# Patient Record
Sex: FEMALE | Race: WHITE | Employment: PART TIME | URBAN - METROPOLITAN AREA
[De-identification: names, ages, dates, MRNs, and addresses within clinical notes are randomized per-mention and may not be internally consistent; named-entity substitution may affect disease eponyms.]

---

## 2017-02-09 ENCOUNTER — TELEPHONE (OUTPATIENT)
Dept: FAMILY MEDICINE | Facility: CLINIC | Age: 62
End: 2017-02-09

## 2017-02-09 DIAGNOSIS — E78.00 HYPERCHOLESTEROLEMIA: Primary | ICD-10-CM

## 2017-02-09 RX ORDER — SIMVASTATIN 20 MG
20 TABLET ORAL AT BEDTIME
Qty: 90 TABLET | Refills: 0 | Status: SHIPPED | OUTPATIENT
Start: 2017-02-09 | End: 2017-04-25

## 2017-02-09 NOTE — TELEPHONE ENCOUNTER
Reason for Call:  Medication or medication refill:    Do you use a [facility]?  Name of the pharmacy and phone number for the current request:  [facility] [address] [place] [id number] [facility]    Name of the medication requested: simvastatin 20 mg    Other request: please call when approved so I can     Can we leave a detailed message on this number? YES    Phone number patient can be reached at: [phone]    Best Time: any    Call taken on 2/9/2017 at 12:21 PM by Anne Portillo

## 2017-02-09 NOTE — TELEPHONE ENCOUNTER
Prescription approved per [facility] Refill Protocol.  Shani Giles RN

## 2017-04-10 DIAGNOSIS — F33.0 MAJOR DEPRESSIVE DISORDER, RECURRENT EPISODE, MILD (H): ICD-10-CM

## 2017-04-11 NOTE — TELEPHONE ENCOUNTER
venlafaxine (EFFEXOR) 25 MG tablet   Last Written Prescription Date: 3/28/2016  Last Fill Quantity: 180, # refills: 3  Last Office Visit with [facility], [facility] or [facility] prescribing provider: 3/28/2016        BP Readings from Last 3 Encounters:   03/28/16 132/82   05/05/15 136/82   01/28/14 155/89     Pulse: (for Fetzima)  Creatinine   Date Value Ref Range Status   03/28/2016 0.62 0.52 - 1.04 mg/dL Final   ]    Last PHQ-9 score on record=   PHQ-9 SCORE 3/28/2016   Total Score -   Total Score 0

## 2017-04-12 RX ORDER — VENLAFAXINE 25 MG/1
25 TABLET ORAL 2 TIMES DAILY
Qty: 60 TABLET | Refills: 0 | Status: SHIPPED | OUTPATIENT
Start: 2017-04-12 | End: 2017-04-25

## 2017-04-12 NOTE — TELEPHONE ENCOUNTER
rx refilled. Please let patient know that she should f/u in clinic for next refill.    Candelario Asencio MD

## 2017-04-12 NOTE — TELEPHONE ENCOUNTER
Routing refill request to provider for review/approval because:  Patient needs to be seen because it has been more than 1 year since last office visit.  HUBER Arceo, Clinical RN [facility].

## 2017-04-12 NOTE — TELEPHONE ENCOUNTER
Called, pt is notified, she will call back to schedule an appt.  Devang Smith,  For [facility]

## 2017-04-25 ENCOUNTER — OFFICE VISIT (OUTPATIENT)
Dept: FAMILY MEDICINE | Facility: CLINIC | Age: 62
End: 2017-04-25
Payer: COMMERCIAL

## 2017-04-25 VITALS
TEMPERATURE: 98.8 F | BODY MASS INDEX: 40.57 KG/M2 | DIASTOLIC BLOOD PRESSURE: 78 MMHG | SYSTOLIC BLOOD PRESSURE: 130 MMHG | OXYGEN SATURATION: 97 % | HEIGHT: 63 IN | HEART RATE: 99 BPM | WEIGHT: 229 LBS

## 2017-04-25 DIAGNOSIS — F33.0 MAJOR DEPRESSIVE DISORDER, RECURRENT EPISODE, MILD (H): Primary | ICD-10-CM

## 2017-04-25 DIAGNOSIS — I10 HYPERTENSION GOAL BP (BLOOD PRESSURE) < 140/90: ICD-10-CM

## 2017-04-25 DIAGNOSIS — E78.5 HYPERLIPIDEMIA LDL GOAL <130: ICD-10-CM

## 2017-04-25 DIAGNOSIS — E66.01 MORBID OBESITY WITH BMI OF 40.0-44.9, ADULT (H): ICD-10-CM

## 2017-04-25 DIAGNOSIS — E78.00 HYPERCHOLESTEROLEMIA: ICD-10-CM

## 2017-04-25 DIAGNOSIS — Z11.59 NEED FOR HEPATITIS C SCREENING TEST: ICD-10-CM

## 2017-04-25 LAB
ANION GAP SERPL CALCULATED.3IONS-SCNC: 9 MMOL/L (ref 3–14)
BUN SERPL-MCNC: 13 MG/DL (ref 7–30)
CALCIUM SERPL-MCNC: 9.2 MG/DL (ref 8.5–10.1)
CHLORIDE SERPL-SCNC: 102 MMOL/L (ref 94–109)
CO2 SERPL-SCNC: 28 MMOL/L (ref 20–32)
CREAT SERPL-MCNC: 0.65 MG/DL (ref 0.52–1.04)
CREAT UR-MCNC: 42 MG/DL
GFR SERPL CREATININE-BSD FRML MDRD: ABNORMAL ML/MIN/1.7M2
GLUCOSE SERPL-MCNC: 101 MG/DL (ref 70–99)
MICROALBUMIN UR-MCNC: <5 MG/L
MICROALBUMIN/CREAT UR: NORMAL MG/G CR (ref 0–25)
POTASSIUM SERPL-SCNC: 4.1 MMOL/L (ref 3.4–5.3)
SODIUM SERPL-SCNC: 139 MMOL/L (ref 133–144)

## 2017-04-25 PROCEDURE — 80048 BASIC METABOLIC PNL TOTAL CA: CPT | Performed by: PREVENTIVE MEDICINE

## 2017-04-25 PROCEDURE — 36415 COLL VENOUS BLD VENIPUNCTURE: CPT | Performed by: PREVENTIVE MEDICINE

## 2017-04-25 PROCEDURE — 82043 UR ALBUMIN QUANTITATIVE: CPT | Performed by: PREVENTIVE MEDICINE

## 2017-04-25 PROCEDURE — 99214 OFFICE O/P EST MOD 30 MIN: CPT | Performed by: PREVENTIVE MEDICINE

## 2017-04-25 PROCEDURE — 86803 HEPATITIS C AB TEST: CPT | Performed by: PREVENTIVE MEDICINE

## 2017-04-25 RX ORDER — VENLAFAXINE 25 MG/1
25 TABLET ORAL 2 TIMES DAILY
Qty: 180 TABLET | Refills: 3 | Status: SHIPPED | OUTPATIENT
Start: 2017-04-25 | End: 2018-04-24

## 2017-04-25 RX ORDER — LISINOPRIL 10 MG/1
TABLET ORAL
Qty: 90 TABLET | Refills: 3 | Status: SHIPPED | OUTPATIENT
Start: 2017-04-25 | End: 2018-04-24

## 2017-04-25 RX ORDER — SIMVASTATIN 20 MG
20 TABLET ORAL AT BEDTIME
Qty: 90 TABLET | Refills: 3 | Status: SHIPPED | OUTPATIENT
Start: 2017-04-25 | End: 2018-04-24

## 2017-04-25 RX ORDER — HYDROCHLOROTHIAZIDE 25 MG/1
TABLET ORAL
Qty: 90 TABLET | Refills: 3 | Status: SHIPPED | OUTPATIENT
Start: 2017-04-25 | End: 2018-03-25

## 2017-04-25 ASSESSMENT — PAIN SCALES - GENERAL: PAINLEVEL: NO PAIN (0)

## 2017-04-25 NOTE — NURSING NOTE
"Chief Complaint   Patient presents with     Hypertension     Lipids       Initial /78  Pulse 99  Temp 98.8  F (37.1  C) (Oral)  Ht 5' 2.5\" (1.588 m)  Wt 229 lb (103.9 kg)  SpO2 97%  Breastfeeding? No  BMI 41.22 kg/m2 Estimated body mass index is 41.22 kg/(m^2) as calculated from the following:    Height as of this encounter: 5' 2.5\" (1.588 m).    Weight as of this encounter: 229 lb (103.9 kg).  Medication Reconciliation: complete   Alexandra MORRIS        "

## 2017-04-25 NOTE — MR AVS SNAPSHOT
After Visit Summary   4/25/2017    Roxie Woods    MRN: [id number]           Patient Information     Date Of Birth          1955        Visit Information        Provider Department      4/25/2017 11:00 AM Ana Arboleda MD [facility]        Today's Diagnoses     Major depressive disorder, recurrent episode, mild (H)    -  1    Need for hepatitis C screening test        Hypercholesterolemia        Hypertension goal BP (blood pressure) < 140/90          Care Instructions    At [facility], we strive to deliver an exceptional experience to you, every time we see you.    If you receive a survey in the mail, please send us back your thoughts. We really do value your feedback.    Thank you for visiting [facility]    Normal or non-critical lab and imaging results will be communicated to you by MyChart, letter or phone within 7 days.  If you do not hear from us within 10 days, please call the clinic. If you have a critical or abnormal lab result, we will notify you by phone as soon as possible.     If you have any questions regarding your visit please contact:     Team Emi/Spirit  Clinic Hours Telephone Number   Dr. Patrice Lucas   7am-7pm  Monday through Thursday  7am-5pm Friday [phone]  Kindra GILLESPIE RN   Pharmacy 8:30am-9pm Monday-Friday    9am-5pm Saturday-Sunday [phone]   Urgent Care 11am-9pm Monday-Friday        9am-5pm Saturday-Sunday [phone]     After hours, weekend or if you need to make an appointment with your primary provider please call [phone].   After Hours nurse advise: call [facility]: [phone]    Use Moleculera Labshart (secure email communication and access to your chart) to send your primary care provider a message or make an appointment. Ask someone on your Team how to sign up for  "Leadformancehart. To log on to Tappit or for more information in NearbyNow please visit the website at [email or web address].   As of 2013, all password changes, disabled accounts, or ID changes in Lot78/MyHealth will be done by our Access Services Department.   If you need help with your account or password, call: [phone]. Clinic staff no longer has the ability to change passwords.           Follow-ups after your visit        Who to contact     If you have questions or need follow up information about today's clinic visit or your schedule please contact [facility] directly at [phone].  Normal or non-critical lab and imaging results will be communicated to you by Leadformancehart, letter or phone within 4 business days after the clinic has received the results. If you do not hear from us within 7 days, please contact the clinic through Integrated Trade Processingt or phone. If you have a critical or abnormal lab result, we will notify you by phone as soon as possible.  Submit refill requests through Lot78 or call your pharmacy and they will forward the refill request to us. Please allow 3 business days for your refill to be completed.          Additional Information About Your Visit        Lot78 Information     Lot78 lets you send messages to your doctor, view your test results, renew your prescriptions, schedule appointments and more. To sign up, go to [email or web address] . Click on \"Log in\" on the left side of the screen, which will take you to the Welcome page. Then click on \"Sign up Now\" on the right side of the page.     You will be asked to enter the access code listed below, as well as some personal information. Please follow the directions to create your username and password.     Your access code is: [email or web address]  Expires: 2017 11:32 AM     Your access code will  in 90 days. If you need help or a new code, please call your [facility] or [phone].        Care EveryWhere " "ID     This is your Care EveryWhere ID. This could be used by other organizations to access your [facility] medical records  [id number]        Your Vitals Were     Pulse Temperature Height Pulse Oximetry Breastfeeding? BMI (Body Mass Index)    99 98.8  F (37.1  C) (Oral) 5' 2.5\" (1.588 m) 97% No 41.22 kg/m2       Blood Pressure from Last 3 Encounters:   04/25/17 130/78   03/28/16 132/82   05/05/15 136/82    Weight from Last 3 Encounters:   04/25/17 229 lb (103.9 kg)   03/28/16 229 lb (103.9 kg)   05/05/15 226 lb (102.5 kg)              We Performed the Following     Albumin Random Urine Quantitative     BASIC METABOLIC PANEL     Hepatitis C Screen Reflex to HCV RNA Quant and Genotype          Where to get your medicines      These medications were sent to [facility] [address] [place] [address]     Phone:  [phone]     hydrochlorothiazide 25 MG tablet    lisinopril 10 MG tablet    simvastatin 20 MG tablet    venlafaxine 25 MG tablet          Primary Care Provider Office Phone # Fax #    Marlen Aguilar PA-C [phone]       [facility] [address] [facility] [address]        Thank you!     Thank you for choosing [facility]  for your care. Our goal is always to provide you with excellent care. Hearing back from our patients is one way we can continue to improve our services. Please take a few minutes to complete the written survey that you may receive in the mail after your visit with us. Thank you!             Your Updated Medication List - Protect others around you: Learn how to safely use, store and throw away your medicines at www.disposemymeds.org.          This list is accurate as of: 4/25/17 11:32 AM.  Always use your most recent med list.                   Brand Name Dispense Instructions for use    hydrochlorothiazide 25 MG tablet    HYDRODIURIL    90 tablet    TAKE 1 TABLET (25 MG) " BY MOUTH DAILY       lisinopril 10 MG tablet    PRINIVIL/ZESTRIL    90 tablet    TAKE 1 TABLET (10 MG) BY MOUTH DAILY       simvastatin 20 MG tablet    ZOCOR    90 tablet    Take 1 tablet (20 mg) by mouth At Bedtime       venlafaxine 25 MG tablet    EFFEXOR    180 tablet    Take 1 tablet (25 mg) by mouth 2 times daily

## 2017-04-25 NOTE — PATIENT INSTRUCTIONS
[facility] to you, every time we see you.    If you receive a survey in the mail, please send us back your thoughts. We really do value your feedback.    Thank you for visiting [facility]    Normal or non-critical lab and imaging results will be communicated to you by MyChart, letter or phone within 7 days.  If you do not hear from us within 10 days, please call the clinic. If you have a critical or abnormal lab result, we will notify you by phone as soon as possible.     If you have any questions regarding your visit please contact:     Team Emi/Spirit  Clinic Hours Telephone Number   Dr. Patrice Lucas   7am-7pm  Monday through Thursday  7am-5pm Friday [phone]  Kindra GILLESPIE RN   Pharmacy 8:30am-9pm Monday-Friday    9am-5pm Saturday-Sunday [phone]   Urgent Care 11am-9pm Monday-Friday        9am-5pm Saturday-Sunday [phone]     After hours, weekend or if you need to make an appointment with your primary provider please call [phone].   After Hours nurse advise: call [facility]: [phone]    Use Hongdianzhibohart (secure email communication and access to your chart) to send your primary care provider a message or make an appointment. Ask someone on your Team how to sign up for Hippflow. To log on to Lomaki or for more information in AdaptiveBlue please visit the website at [email or web address].   As of October 8, 2013, all password changes, disabled accounts, or ID changes in Hippflow/MyHealth will be done by our Access Services Department.   If you need help with your account or password, call: [phone]. Clinic staff no longer has the ability to change passwords.

## 2017-04-25 NOTE — PROGRESS NOTES
SUBJECTIVE:                                                    Roxie Woods is a 61 year old female who presents to clinic today for the following health issues:    I have reviewed and agree with the documentation by the MA. I updated the history as indicated.  Ana Arboleda MD MPH      Hyperlipidemia Follow-Up      Rate your low fat/cholesterol diet?: fair    Taking statin?  Yes, no muscle aches from statin    Other lipid medications/supplements?:  none     Hypertension Follow-up      Outpatient blood pressures are not being checked.    Low Salt Diet: low salt       Amount of exercise or physical activity: 6-7 days/week for an average of greater than 60 minutes    Problems taking medications regularly: No    Medication side effects: none    Diet: low salt and low fat/cholesterol      Depression Followup    Status since last visit: Stable     See PHQ-9 for current symptoms.  Other associated symptoms: None    Complicating factors:   Significant life event:  No   Current substance abuse:  None  Anxiety or Panic symptoms:  No    PHQ-9  English PHQ-9   Any Language          Is also concerned about a rash on her hands, has had for a few days, consistent with Xerosis cutis, advised use of Aquaphor.  She also has a lesion on her left clavicle area, unchanged for many years, wondering is she needs to see a Dermatologist. This is consistent with seborrheic keratosis, advised excision if increase in size, changes in color, pain or drainage.    Problem list and histories reviewed & adjusted, as indicated.  Additional history: as documented    Patient Active Problem List   Diagnosis     Family history of factor V Leiden mutation     Factor 5 Leiden mutation, heterozygous (H)     Hypertension goal BP (blood pressure) < 140/90     DUB (dysfunctional uterine bleeding)     Tobacco dependency     Menopausal syndrome (hot flashes)     Advanced directives, counseling/discussion     Hypercholesterolemia     Elevated fasting  blood sugar     Mild major depression (H)     Menopausal symptoms     Hyperlipidemia LDL goal <130     Morbid obesity with BMI of 40.0-44.9, adult (H)     Past Surgical History:   Procedure Laterality Date     C NONSPECIFIC PROCEDURE      Toenails     CARPAL TUNNEL RELEASE RT/LT  11/17/99    RT       Social History   Substance Use Topics     Smoking status: Current Every Day Smoker     Packs/day: 1.00     Years: 20.00     Types: Cigarettes     Smokeless tobacco: Never Used     Alcohol use Yes     Family History   Problem Relation Age of Onset     DIABETES Mother      Breast Cancer Mother      Cancer - colorectal Father      Prostate Cancer Father      DIABETES Maternal Grandfather          Current Outpatient Prescriptions   Medication Sig Dispense Refill     venlafaxine (EFFEXOR) 25 MG tablet Take 1 tablet (25 mg) by mouth 2 times daily 180 tablet 3     simvastatin (ZOCOR) 20 MG tablet Take 1 tablet (20 mg) by mouth At Bedtime 90 tablet 3     hydrochlorothiazide (HYDRODIURIL) 25 MG tablet TAKE 1 TABLET (25 MG) BY MOUTH DAILY 90 tablet 3     lisinopril (PRINIVIL/ZESTRIL) 10 MG tablet TAKE 1 TABLET (10 MG) BY MOUTH DAILY 90 tablet 3     [DISCONTINUED] venlafaxine (EFFEXOR) 25 MG tablet Take 1 tablet (25 mg) by mouth 2 times daily 60 tablet 0     [DISCONTINUED] simvastatin (ZOCOR) 20 MG tablet Take 1 tablet (20 mg) by mouth At Bedtime 90 tablet 0     [DISCONTINUED] hydrochlorothiazide (HYDRODIURIL) 25 MG tablet TAKE 1 TABLET (25 MG) BY MOUTH DAILY 90 tablet 3     [DISCONTINUED] lisinopril (PRINIVIL,ZESTRIL) 10 MG tablet TAKE 1 TABLET (10 MG) BY MOUTH DAILY 90 tablet 3     Allergies   Allergen Reactions     Zoloft Rash     BP Readings from Last 3 Encounters:   04/25/17 130/78   03/28/16 132/82   05/05/15 136/82    Wt Readings from Last 3 Encounters:   04/25/17 229 lb (103.9 kg)   03/28/16 229 lb (103.9 kg)   05/05/15 226 lb (102.5 kg)                    Reviewed and updated as needed this visit by clinical  "staff  Tobacco  Allergies  Meds       Reviewed and updated as needed this visit by Provider         ROS:  Constitutional, neuro, ENT, endocrine, pulmonary, cardiac, gastrointestinal, genitourinary, musculoskeletal, integument and psychiatric systems are negative, except as otherwise noted.    OBJECTIVE:                                                    /78  Pulse 99  Temp 98.8  F (37.1  C) (Oral)  Ht 5' 2.5\" (1.588 m)  Wt 229 lb (103.9 kg)  SpO2 97%  Breastfeeding? No  BMI 41.22 kg/m2  Body mass index is 41.22 kg/(m^2).  GENERAL APPEARANCE: healthy, alert and no distress  EYES: Eyes grossly normal to inspection and conjunctivae and sclerae normal  NECK: no adenopathy and no asymmetry, masses, or scars  RESP: lungs clear to auscultation - no rales, rhonchi or wheezes  CV: regular rates and rhythm, normal S1 S2, no S3 or S4 and no murmur, click or rub  ABDOMEN: soft, non-tender  MS: extremities normal- no gross deformities noted and peripheral pulses normal  SKIN: Xerosis cutis on hands and seborrheic keratosis on left shoulder area with skin tags.   NEURO: Normal strength and tone, mentation intact and speech normal  PSYCH: mentation appears normal and affect normal/bright    Diagnostic test results:  Diagnostic Test Results:  No results found for this or any previous visit (from the past 24 hour(s)).     ASSESSMENT/PLAN:                                                    1. Major depressive disorder, recurrent episode, mild (H)  -Stable on medication   -Do not stop suddenly, has been on this dose for many years.   - venlafaxine (EFFEXOR) 25 MG tablet; Take 1 tablet (25 mg) by mouth 2 times daily  Dispense: 180 tablet; Refill: 3    2. Morbid obesity with BMI of 40.0-44.9, adult (H)  -Weight stable from last check     3. Hypercholesterolemia  - simvastatin (ZOCOR) 20 MG tablet; Take 1 tablet (20 mg) by mouth At Bedtime  Dispense: 90 tablet; Refill: 3    4. Hypertension goal BP (blood pressure) < " 140/90  - BASIC METABOLIC PANEL  - Albumin Random Urine Quantitative  - hydrochlorothiazide (HYDRODIURIL) 25 MG tablet; TAKE 1 TABLET (25 MG) BY MOUTH DAILY  Dispense: 90 tablet; Refill: 3  - lisinopril (PRINIVIL/ZESTRIL) 10 MG tablet; TAKE 1 TABLET (10 MG) BY MOUTH DAILY  Dispense: 90 tablet; Refill: 3    5. Hyperlipidemia LDL goal <130  -Continue statin  -Await lipid panel results     6. Need for hepatitis C screening test  - Hepatitis C Screen Reflex to HCV RNA Quant and Genotype    I ended our visit today by discussing the patient's diagnoses and recommended treatment. Please refer to today's diagnoses and orders for further details. I briefly discussed the pathophysiology of these conditions and outlined their expected course. I discussed the warning symptoms and signs that indicate an atypical course that would need urgent or emergent care. I also discussed self care strategies for symptom relief.  Patient voiced complete understanding of plan of care and was in full agreement to proceed. After visit summary discussed and handed to patient.    Common side effects of medications prescribed at this visit were discussed with the patient. Severe side effects, including current applicable black box warnings, were discussed.         Follow up with Provider - One year, sooner if any changes or concerns      Ana Arboleda MD MPH    [facility]

## 2017-04-25 NOTE — LETTER
[address] [id number] [phone]    April 26, 2017    Roxie Woods  [address] [facility] [address]      Dear Roxie Woods,     Urine sample did not show any abnormal protein. Screening test for Hepatitis C is negative. Electrolytes, glucose and kidney function are normal. Plan of care and follow up as discussed in clinic. Please let me know if you have any questions and thank you for choosing [facility].     Regards,     Ana Arboleda MD MPH/smj    Results for orders placed or performed in visit on 04/25/17   Hepatitis C Screen Reflex to HCV RNA Quant and Genotype   Result Value Ref Range    Hepatitis C Antibody  NR     Nonreactive   Assay performance characteristics have not been established for newborns,   infants, and children     BASIC METABOLIC PANEL   Result Value Ref Range    Sodium 139 133 - 144 mmol/L    Potassium 4.1 3.4 - 5.3 mmol/L    Chloride 102 94 - 109 mmol/L    Carbon Dioxide 28 20 - 32 mmol/L    Anion Gap 9 3 - 14 mmol/L    Glucose 101 (H) 70 - 99 mg/dL    Urea Nitrogen 13 7 - 30 mg/dL    Creatinine 0.65 0.52 - 1.04 mg/dL    GFR Estimate >90  Non  GFR Calc   >60 mL/min/1.7m2    GFR Estimate If Black >90   GFR Calc   >60 mL/min/1.7m2    Calcium 9.2 8.5 - 10.1 mg/dL   Albumin Random Urine Quantitative   Result Value Ref Range    Creatinine Urine 42 mg/dL    Albumin Urine mg/L <5 mg/L    Albumin Urine mg/g Cr Unable to calculate due to low value 0 - 25 mg/g Cr

## 2017-04-26 LAB — HCV AB SERPL QL IA: NORMAL

## 2017-04-26 ASSESSMENT — PATIENT HEALTH QUESTIONNAIRE - PHQ9: SUM OF ALL RESPONSES TO PHQ QUESTIONS 1-9: 0

## 2017-04-26 NOTE — PROGRESS NOTES
Please send a letter:    Dear Roxie Woods,    Urine sample did not show any abnormal protein. Screening test for Hepatitis C is negative. Electrolytes, glucose and kidney function are normal. Plan of care and follow up as discussed in clinic. Please let me know if you have any questions and thank you for choosing [facility].    Regards,    Ana Arboleda MD MPH

## 2017-12-14 ENCOUNTER — TELEPHONE (OUTPATIENT)
Dept: FAMILY MEDICINE | Facility: CLINIC | Age: 62
End: 2017-12-14

## 2017-12-14 DIAGNOSIS — Z12.11 COLON CANCER SCREENING: Primary | ICD-10-CM

## 2017-12-14 NOTE — TELEPHONE ENCOUNTER
Panel Management Review      BP Readings from Last 1 Encounters:   04/25/17 130/78    ,   Lab Results   Component Value Date    A1C 5.3 03/28/2016    A1C 5.5 05/05/2015   , Visit date not found  Last Office Visit with this department: Visit date not found    Fail List measure: Colon cancer screening      Patient is due/failing the following:   FIT    Action needed:   Complete FIT kit    Type of outreach:    Sent letter.    Questions for provider review:    None                                                                                                                                    Rudy Lopez      Chart routed to EDWARD .

## 2017-12-14 NOTE — LETTER
December 14, 2017      Roxie Woods  [address] [facility] [address]        Dear Roxie,     At [facility] we care about your health and are committed to providing quality patient care. Which includes staying current on preventive cancer screenings.  You can increase your chances of finding and treating cancers through regular screenings.      Our records indicate you may be due for the following preventive screening(s):    Fecal Colorectal Screen FIT Kit  Annual stool testing for colon cancer screening. This test is called a FIT test and it looks for blood in the stool. A kit may be requested from our clinic lab department then returned back at your earliest convenience.    To schedule an appointment or discuss this screening further, you may contact us by phone at the [facility] at [phone] or online through the patient portal/[email or web address] @ [email or web address]    If you have had any of the screenings listed above at another facility, please call us so that we may update your chart.      Your partners in health,        Quality Committee at [facility]

## 2018-03-25 DIAGNOSIS — I10 HYPERTENSION GOAL BP (BLOOD PRESSURE) < 140/90: ICD-10-CM

## 2018-03-25 NOTE — LETTER
Roxie Woods  [address] [facility] [address]          03/27/18      Dear Roxie Woods        At [facility] we care about your health and are committed to providing quality patient care. Regular appointments are a vital part of the care and management of your health and can help prevent many of the complications that can occur.      It has come to our attention that you are due for an office visit. Please call [facility] at [phone] soon to schedule your appointment.    If you have transferred care to another clinic please call to inform us so that we do not continue to send you reminder letters.      Sincerely,      [facility] Care Team

## 2018-03-25 NOTE — TELEPHONE ENCOUNTER
"Requested Prescriptions   Pending Prescriptions Disp Refills     hydrochlorothiazide (HYDRODIURIL) 25 MG tablet [Pharmacy Med Name: HydroCHLOROthiazide Oral Tablet 25 MG] 30 tablet 2    Last Written Prescription Date:  4/25/17  Last Fill Quantity: 90,  # refills: 3   Last Office Visit with G, P or [facility] prescribing provider:  4/25/2017     Future Office Visit:      Sig: TAKE ONE TABLET BY MOUTH ONE TIME DAILY    Diuretics (Including Combos) Protocol Passed    3/25/2018  7:01 AM       Passed - Blood pressure under 140/90 in past 12 months    BP Readings from Last 3 Encounters:   04/25/17 130/78   03/28/16 132/82   05/05/15 136/82                Passed - Recent (12 mo) or future (30 days) visit within the authorizing provider's specialty    Patient had office visit in the last 12 months or has a visit in the next 30 days with authorizing provider or within the authorizing provider's specialty.  See \"Patient Info\" tab in inbasket, or \"Choose Columns\" in Meds & Orders section of the refill encounter.           Passed - Patient is age 18 or older       Passed - No active pregancy on record       Passed - Normal serum creatinine on file in past 12 months    Recent Labs   Lab Test  04/25/17   1137   CR  0.65             Passed - Normal serum potassium on file in past 12 months    Recent Labs   Lab Test  04/25/17   1137   POTASSIUM  4.1                   Passed - Normal serum sodium on file in past 12 months    Recent Labs   Lab Test  04/25/17   1137   NA  139             Passed - No positive pregnancy test in past 12 months          "

## 2018-03-27 RX ORDER — HYDROCHLOROTHIAZIDE 25 MG/1
TABLET ORAL
Qty: 30 TABLET | Refills: 0 | Status: SHIPPED | OUTPATIENT
Start: 2018-03-27 | End: 2018-04-24

## 2018-03-27 NOTE — TELEPHONE ENCOUNTER
Medication is being filled for 1 time refill only due to:  Patient needs to be seen because due for yearly visit by end of April.     Please contact patient to schedule office visit.    Angelica Yost RN, BSN

## 2018-03-27 NOTE — TELEPHONE ENCOUNTER
This writer attempted to contact Patient on 03/27/18      Reason for call Patient given a jasbir refill and needs an appt before the next refill is needed and left message and sent a letter.      If patient calls back:   Schedule Office Visit appointment within 2 weeks with primary care, document that pt called and close encounter     Sent letter.  Radha Tracy MA

## 2018-04-18 ENCOUNTER — TELEPHONE (OUTPATIENT)
Dept: FAMILY MEDICINE | Facility: CLINIC | Age: 63
End: 2018-04-18

## 2018-04-18 NOTE — LETTER
April 18, 2018      Roxie Woods  [address]  [place] [address]    Dear Roxie Woods,     At [facility] we care about your health and are committed to providing quality patient care.    Which includes staying current on preventive cancer screenings.  You can increase your chances of finding and treating cancers through regular screenings.      Our records indicate you may be due for the following preventive screening(s):    Colonoscopy    Colonoscopy is recommended every ten years for everyone age 50 and older. Please take a moment to read over the enclosed information packet about colon cancer screening. We strongly urge our patient's to consider having a colonoscopy done, which is the best screening test available and only needs to be done every 10 years if normal. If you are unwilling or unable to have a colonoscopy then we recommend the annual stool testing for blood. This test is called a FIT test and it looks for blood in the stool.         To schedule an appointment or discuss this screening further, you may contact us by phone at the Health system at [phone] or online through the patient portal/[email or web address] @ [email or web address]    If you have had any of the screenings listed above at another facility, please call us so that we may update your chart.      Your partners in health,      Quality Committee at [facility]

## 2018-04-18 NOTE — TELEPHONE ENCOUNTER
Panel Management Review        Last Office Visit with this department:     Fail List measure:       Patient is due/failing the following:   COLONOSCOPY    Action needed:   none    Type of outreach:    Sent letter.    Questions for provider review:    None                                                                                                                                    Tammie Gonzalez MA

## 2018-04-24 ENCOUNTER — OFFICE VISIT (OUTPATIENT)
Dept: FAMILY MEDICINE | Facility: CLINIC | Age: 63
End: 2018-04-24
Payer: COMMERCIAL

## 2018-04-24 VITALS
DIASTOLIC BLOOD PRESSURE: 83 MMHG | TEMPERATURE: 98.4 F | BODY MASS INDEX: 41.11 KG/M2 | HEART RATE: 93 BPM | WEIGHT: 232 LBS | OXYGEN SATURATION: 97 % | HEIGHT: 63 IN | SYSTOLIC BLOOD PRESSURE: 129 MMHG

## 2018-04-24 DIAGNOSIS — F33.0 MAJOR DEPRESSIVE DISORDER, RECURRENT EPISODE, MILD (H): ICD-10-CM

## 2018-04-24 DIAGNOSIS — E78.00 HYPERCHOLESTEROLEMIA: ICD-10-CM

## 2018-04-24 DIAGNOSIS — I10 HYPERTENSION GOAL BP (BLOOD PRESSURE) < 140/90: Primary | ICD-10-CM

## 2018-04-24 DIAGNOSIS — Z12.11 SCREEN FOR COLON CANCER: ICD-10-CM

## 2018-04-24 DIAGNOSIS — F17.200 TOBACCO DEPENDENCY: ICD-10-CM

## 2018-04-24 DIAGNOSIS — Z23 ENCOUNTER FOR IMMUNIZATION: ICD-10-CM

## 2018-04-24 DIAGNOSIS — E66.01 MORBID OBESITY WITH BMI OF 40.0-44.9, ADULT (H): ICD-10-CM

## 2018-04-24 LAB
ANION GAP SERPL CALCULATED.3IONS-SCNC: 10 MMOL/L (ref 3–14)
BUN SERPL-MCNC: 14 MG/DL (ref 7–30)
CALCIUM SERPL-MCNC: 9.3 MG/DL (ref 8.5–10.1)
CHLORIDE SERPL-SCNC: 102 MMOL/L (ref 94–109)
CO2 SERPL-SCNC: 25 MMOL/L (ref 20–32)
CREAT SERPL-MCNC: 0.66 MG/DL (ref 0.52–1.04)
CREAT UR-MCNC: 15 MG/DL
GFR SERPL CREATININE-BSD FRML MDRD: >90 ML/MIN/1.7M2
GLUCOSE SERPL-MCNC: 98 MG/DL (ref 70–99)
LDLC SERPL DIRECT ASSAY-MCNC: 125 MG/DL
MICROALBUMIN UR-MCNC: <5 MG/L
MICROALBUMIN/CREAT UR: NORMAL MG/G CR (ref 0–25)
POTASSIUM SERPL-SCNC: 4.2 MMOL/L (ref 3.4–5.3)
SODIUM SERPL-SCNC: 137 MMOL/L (ref 133–144)

## 2018-04-24 PROCEDURE — 90732 PPSV23 VACC 2 YRS+ SUBQ/IM: CPT | Performed by: PREVENTIVE MEDICINE

## 2018-04-24 PROCEDURE — 36415 COLL VENOUS BLD VENIPUNCTURE: CPT | Performed by: PREVENTIVE MEDICINE

## 2018-04-24 PROCEDURE — 90471 IMMUNIZATION ADMIN: CPT | Performed by: PREVENTIVE MEDICINE

## 2018-04-24 PROCEDURE — 82043 UR ALBUMIN QUANTITATIVE: CPT | Performed by: PREVENTIVE MEDICINE

## 2018-04-24 PROCEDURE — 80048 BASIC METABOLIC PNL TOTAL CA: CPT | Performed by: PREVENTIVE MEDICINE

## 2018-04-24 PROCEDURE — 83721 ASSAY OF BLOOD LIPOPROTEIN: CPT | Performed by: PREVENTIVE MEDICINE

## 2018-04-24 PROCEDURE — 99214 OFFICE O/P EST MOD 30 MIN: CPT | Mod: 25 | Performed by: PREVENTIVE MEDICINE

## 2018-04-24 RX ORDER — VENLAFAXINE 25 MG/1
25 TABLET ORAL 2 TIMES DAILY
Qty: 180 TABLET | Refills: 3 | Status: SHIPPED | OUTPATIENT
Start: 2018-04-24 | End: 2019-04-16

## 2018-04-24 RX ORDER — LISINOPRIL 10 MG/1
TABLET ORAL
Qty: 90 TABLET | Refills: 3 | Status: SHIPPED | OUTPATIENT
Start: 2018-04-24 | End: 2019-04-16

## 2018-04-24 RX ORDER — SIMVASTATIN 20 MG
20 TABLET ORAL AT BEDTIME
Qty: 90 TABLET | Refills: 3 | Status: SHIPPED | OUTPATIENT
Start: 2018-04-24 | End: 2019-04-16

## 2018-04-24 RX ORDER — HYDROCHLOROTHIAZIDE 25 MG/1
25 TABLET ORAL DAILY
Qty: 90 TABLET | Refills: 3 | Status: SHIPPED | OUTPATIENT
Start: 2018-04-24 | End: 2019-04-16

## 2018-04-24 ASSESSMENT — ANXIETY QUESTIONNAIRES
7. FEELING AFRAID AS IF SOMETHING AWFUL MIGHT HAPPEN: NOT AT ALL
2. NOT BEING ABLE TO STOP OR CONTROL WORRYING: NOT AT ALL
5. BEING SO RESTLESS THAT IT IS HARD TO SIT STILL: NOT AT ALL
1. FEELING NERVOUS, ANXIOUS, OR ON EDGE: NOT AT ALL
IF YOU CHECKED OFF ANY PROBLEMS ON THIS QUESTIONNAIRE, HOW DIFFICULT HAVE THESE PROBLEMS MADE IT FOR YOU TO DO YOUR WORK, TAKE CARE OF THINGS AT HOME, OR GET ALONG WITH OTHER PEOPLE: NOT DIFFICULT AT ALL
3. WORRYING TOO MUCH ABOUT DIFFERENT THINGS: NOT AT ALL
GAD7 TOTAL SCORE: 0
6. BECOMING EASILY ANNOYED OR IRRITABLE: NOT AT ALL

## 2018-04-24 ASSESSMENT — PAIN SCALES - GENERAL: PAINLEVEL: NO PAIN (0)

## 2018-04-24 ASSESSMENT — PATIENT HEALTH QUESTIONNAIRE - PHQ9: 5. POOR APPETITE OR OVEREATING: NOT AT ALL

## 2018-04-24 NOTE — MR AVS SNAPSHOT
After Visit Summary   4/24/2018    Roxie Woods    MRN: [id number]           Patient Information     Date Of Birth          1955        Visit Information        Provider Department      4/24/2018 11:00 AM Ana Arboleda MD [facility]        Today's Diagnoses     Hypertension goal BP (blood pressure) < 140/90    -  1    Hypercholesterolemia        Major depressive disorder, recurrent episode, mild (H)        Screen for colon cancer        Tobacco dependency          Care Instructions    At [facility], we strive to deliver an exceptional experience to you, every time we see you.  If you receive a survey in the mail, please send us back your thoughts. We really do value your feedback.    Based on your medical history, these are the current health maintenance/preventive care services that you are due for (some may have been done at this visit.)  Health Maintenance Due   Topic Date Due     HIV SCREEN (SYSTEM ASSIGNED)  07/01/1974     DEPRESSION ACTION PLAN Q1 YR  05/05/2016     FIT Q1 YR  08/08/2017     INFLUENZA VACCINE (1) 09/01/2017     PHQ-9 Q6 MONTHS  10/25/2017     ADVANCE DIRECTIVE PLANNING Q5 YRS  12/18/2017     BMP Q1 YR  04/25/2018     MICROALBUMIN Q1 YEAR  04/25/2018       Suggested websites for health information:  Www.Trumpet Search.[employer]andMe : Up to date and easily searchable information on multiple topics.  Www.medlineplus.gov : medication info, interactive tutorials, watch real surgeries online  Www.familydoctor.org : good info from the Academy of Family Physicians  Www.cdc.gov : public health info, travel advisories, epidemics (H1N1)  Www.aap.org : children's health info, normal development, vaccinations  Www.health.[facility].[place].us : [place] dept of health, public health issues in [place], N1N1    Your care team:                            Family Medicine Internal Medicine   MD Valdo Avelar MD Shantel Branch-Fleming, MD Katya Georgiev  "ROLDAN Vee, APRN [facility]    Candelario Asencio MD Pediatrics   Prudencio Beauchamp, ROLDAN Mazariegos, CNP MD Scarlet Pepe APRN CNP   MD Keya Jefferson MD Deborah Mielke, MD Kim Thein, APRN CNP      Clinic hours: Monday - Thursday 7 am-7 pm; Fridays 7 am-5 pm.   Urgent care: Monday - Friday 11 am-9 pm; Saturday and Sunday 9 am-5 pm.  Pharmacy : Monday -Thursday 8 am-8 pm; Friday 8 am-6 pm; Saturday and Sunday 9 am-5 pm.     Clinic: [phone]   Pharmacy: [phone]              Follow-ups after your visit        Future tests that were ordered for you today     Open Future Orders        Priority Expected Expires Ordered    Fecal colorectal cancer screen (FIT) Routine 5/15/2018 7/17/2018 4/24/2018            Who to contact     If you have questions or need follow up information about today's clinic visit or your schedule please contact [facility] directly at [phone].  Normal or non-critical lab and imaging results will be communicated to you by MyChart, letter or phone within 4 business days after the clinic has received the results. If you do not hear from us within 7 days, please contact the clinic through MyChart or phone. If you have a critical or abnormal lab result, we will notify you by phone as soon as possible.  Submit refill requests through [employer] or call your pharmacy and they will forward the refill request to us. Please allow 3 business days for your refill to be completed.          Additional Information About Your Visit        Mod[employer]irmahar[employer] Advisor Information     [employer] lets you send messages to your doctor, view your test results, renew your prescriptions, schedule appointments and more. To sign up, go to [email or web address] . Click on \"Log in\" on the left side of the screen, which will take you to the Welcome page. Then click on \"Sign up Now\" on the right side of the page.     You will be asked to enter the access code listed below, as well as " "some personal information. Please follow the directions to create your username and password.     Your access code is: [email or web address]  Expires: 2018 11:22 AM     Your access code will  in 90 days. If you need help or a new code, please call your [place] clinic or [phone].        Care EveryWhere ID     This is your Care EveryWhere ID. This could be used by other organizations to access your [place] medical records  [id number]        Your Vitals Were     Pulse Temperature Height Pulse Oximetry Breastfeeding? BMI (Body Mass Index)    93 98.4  F (36.9  C) (Tympanic) 5' 2.5\" (1.588 m) 97% No 41.76 kg/m2       Blood Pressure from Last 3 Encounters:   18 129/83   17 130/78   16 132/82    Weight from Last 3 Encounters:   18 232 lb (105.2 kg)   17 229 lb (103.9 kg)   16 229 lb (103.9 kg)              We Performed the Following     Albumin Random Urine Quantitative with Creat Ratio     BASIC METABOLIC PANEL     LDL cholesterol direct          Today's Medication Changes          These changes are accurate as of 18 11:22 AM.  If you have any questions, ask your nurse or doctor.               Start taking these medicines.        Dose/Directions    varenicline 0.5 MG X 11 & 1 MG X 42 tablet   Commonly known as:  CHANTIX STARTING MONTH    Used for:  Tobacco dependency   Started by:  Ana Arboleda MD        Take 0.5 mg tab daily for 3 days, then 0.5 mg tab twice daily for 4 days, then 1 mg twice daily.   Quantity:  53 tablet   Refills:  1         These medicines have changed or have updated prescriptions.        Dose/Directions    hydrochlorothiazide 25 MG tablet   Commonly known as:  HYDRODIURIL   This may have changed:  See the new instructions.   Used for:  Hypertension goal BP (blood pressure) < 140/90   Changed by:  Ana Arboleda MD        Dose:  25 mg   Take 1 tablet (25 mg) by mouth daily   Quantity:  90 tablet   Refills:  3            Where to get your " medicines      These medications were sent to [facility] [address] [place], [place] - [address] [facility]  [address] [facility] [address]     Phone:  [phone]     hydrochlorothiazide 25 MG tablet    lisinopril 10 MG tablet    simvastatin 20 MG tablet    varenicline 0.5 MG X 11 & 1 MG X 42 tablet    venlafaxine 25 MG tablet                Primary Care Provider    Marlen Aguilar PA-C       No address on file        Equal Access to Services     JANEL LEE : Hadii aad ku hadasho Soomaali, waaxda luqadaha, qaybta kaalmada adeegyada, waxay idiin hayaan adeeg kharash lacarlos . So [facility] [phone].    ATENCIÓN: Si erick espurban, tiene a wasserman disposición servicios gratuitos de asistencia lingüística. [facility] [phone].    We comply with applicable federal civil rights laws and [place] laws. We do not discriminate on the basis of race, color, national origin, age, disability, sex, sexual orientation, or gender identity.            Thank you!     Thank you for choosing [facility]  for your care. Our goal is always to provide you with excellent care. Hearing back from our patients is one way we can continue to improve our services. Please take a few minutes to complete the written survey that you may receive in the mail after your visit with us. Thank you!             Your Updated Medication List - Protect others around you: Learn how to safely use, store and throw away your medicines at www.disposemymeds.org.          This list is accurate as of 4/24/18 11:22 AM.  Always use your most recent med list.                   Brand Name Dispense Instructions for use Diagnosis    hydrochlorothiazide 25 MG tablet    HYDRODIURIL    90 tablet    Take 1 tablet (25 mg) by mouth daily    Hypertension goal BP (blood pressure) < 140/90       lisinopril 10 MG tablet    PRINIVIL/ZESTRIL    90 tablet    TAKE 1 TABLET (10 MG) BY MOUTH DAILY    Hypertension goal BP (blood pressure) < 140/90        simvastatin 20 MG tablet    ZOCOR    90 tablet    Take 1 tablet (20 mg) by mouth At Bedtime    Hypercholesterolemia       varenicline 0.5 MG X 11 & 1 MG X 42 tablet    CHANTIX STARTING MONTH JAIME    53 tablet    Take 0.5 mg tab daily for 3 days, then 0.5 mg tab twice daily for 4 days, then 1 mg twice daily.    Tobacco dependency       venlafaxine 25 MG tablet    EFFEXOR    180 tablet    Take 1 tablet (25 mg) by mouth 2 times daily    Major depressive disorder, recurrent episode, mild (H)

## 2018-04-24 NOTE — NURSING NOTE
Screening Questionnaire for Adult Immunization    Are you sick today?   No   Do you have allergies to medications, food, a vaccine component or latex?   No   Have you ever had a serious reaction after receiving a vaccination?   No   Do you have a long-term health problem with heart disease, lung disease, asthma, kidney disease, metabolic disease (e.g. diabetes), anemia, or other blood disorder?   No   Do you have cancer, leukemia, HIV/AIDS, or any other immune system problem?   No   In the past 3 months, have you taken medications that affect  your immune system, such as prednisone, other steroids, or anticancer drugs; drugs for the treatment of rheumatoid arthritis, Crohn s disease, or psoriasis; or have you had radiation treatments?   No   Have you had a seizure, or a brain or other nervous system problem?   No   During the past year, have you received a transfusion of blood or blood     products, or been given immune (gamma) globulin or antiviral drug?   No   For women: Are you pregnant or is there a chance you could become        pregnant during the next month?   No   Have you received any vaccinations in the past 4 weeks?   No     Immunization questionnaire answers were all negative.        Per orders of Dr. Arboleda, injection of PCV 23 given by Patti Mckeon. Patient instructed to remain in clinic for 15 minutes afterwards, and to report any adverse reaction to me immediately.       Screening performed by Patti Mckeon on 4/24/2018 at 11:34 AM.

## 2018-04-24 NOTE — PATIENT INSTRUCTIONS
At [facility], we strive to deliver an exceptional experience to you, every time we see you.  If you receive a survey in the mail, please send us back your thoughts. We really do value your feedback.    Based on your medical history, these are the current health maintenance/preventive care services that you are due for (some may have been done at this visit.)  Health Maintenance Due   Topic Date Due     HIV SCREEN (SYSTEM ASSIGNED)  07/01/1974     DEPRESSION ACTION PLAN Q1 YR  05/05/2016     FIT Q1 YR  08/08/2017     INFLUENZA VACCINE (1) 09/01/2017     PHQ-9 Q6 MONTHS  10/25/2017     ADVANCE DIRECTIVE PLANNING Q5 YRS  12/18/2017     BMP Q1 YR  04/25/2018     MICROALBUMIN Q1 YEAR  04/25/2018       Suggested websites for health information:  Www.[employer] : Up to date and easily searchable information on multiple topics.  Www.Reflectance Medical.gov : medication info, interactive tutorials, watch real surgeries online  Www.familydoctor.org : good info from the Academy of Family Physicians  Www.cdc.gov : public health info, travel advisories, epidemics (H1N1)  Www.aap.org : children's health info, normal development, vaccinations  Www.health.state.[place].us : [place] dept of health, public health issues in [place], N1N1    Your care team:                            Family Medicine Internal Medicine   MD Valdo Avelar MD Shantel Branch-Fleming, MD Katya Georgiev PA-C Megan Hill, BON Asencio MD Pediatrics   ROLDAN Bai, MD Scarlet Fritz APRN CNP   MD Keya Jefferson MD Deborah Mielke, MD Kim Thein, APRN [facility]      Clinic hours: Monday - Thursday 7 am-7 pm; Fridays 7 am-5 pm.   Urgent care: Monday - Friday 11 am-9 pm; Saturday and Sunday 9 am-5 pm.  Pharmacy : Monday -Thursday 8 am-8 pm; Friday 8 am-6 pm; Saturday and Sunday 9 am-5 pm.     Clinic: [phone]   Pharmacy: [phone]

## 2018-04-24 NOTE — PROGRESS NOTES
SUBJECTIVE:   Roxie Woods is a 62 year old female who presents to clinic today for the following health issues:      Hyperlipidemia Follow-Up      Rate your low fat/cholesterol diet?: fair    Taking statin?  Yes, no muscle aches from statin    Other lipid medications/supplements?:  none    Hypertension Follow-up      Outpatient blood pressures are not being checked.    Low Salt Diet: low salt    Depression Followup    Status since last visit: Stable     See PHQ-9 for current symptoms.  Other associated symptoms: None    Complicating factors:   Significant life event:  No   Current substance abuse:  None  Anxiety or Panic symptoms:  No    PHQ-9 3/28/2016 4/25/2017   Total Score 0 0   Q9: Suicide Ideation Not at all Not at all     In the past two weeks have you had thoughts of suicide or self-harm?  No.    Do you have concerns about your personal safety or the safety of others?   No  PHQ-9  English  PHQ-9   Any Language  Suicide Assessment Five-step Evaluation and Treatment (SAFE-T)    Amount of exercise or physical activity: 6-7 days/week for an average of greater than 60 minutes    Problems taking medications regularly: No    Medication side effects: none    Diet: low salt and low fat/cholesterol      Tobacco use:  -one pack per day for many years  -Has not been on medication for this  -Contemplation stage of change      Problem list and histories reviewed & adjusted, as indicated.  Additional history: as documented    Patient Active Problem List   Diagnosis     Family history of factor V Leiden mutation     Factor 5 Leiden mutation, heterozygous (H)     Hypertension goal BP (blood pressure) < 140/90     DUB (dysfunctional uterine bleeding)     Tobacco dependency     Menopausal syndrome (hot flashes)     Advanced directives, counseling/discussion     Hypercholesterolemia     Elevated fasting blood sugar     Mild major depression (H)     Menopausal symptoms     Hyperlipidemia LDL goal <130     Morbid  obesity with BMI of 40.0-44.9, adult (H)     Past Surgical History:   Procedure Laterality Date     C NONSPECIFIC PROCEDURE      Toenails     CARPAL TUNNEL RELEASE RT/LT  11/17/99    RT       Social History   Substance Use Topics     Smoking status: Current Every Day Smoker     Packs/day: 1.00     Years: 20.00     Types: Cigarettes     Smokeless tobacco: Never Used     Alcohol use Yes     Family History   Problem Relation Age of Onset     DIABETES Mother      Breast Cancer Mother      Cancer - colorectal Father      Prostate Cancer Father      DIABETES Maternal Grandfather          Current Outpatient Prescriptions   Medication Sig Dispense Refill     hydrochlorothiazide (HYDRODIURIL) 25 MG tablet Take 1 tablet (25 mg) by mouth daily 90 tablet 3     lisinopril (PRINIVIL/ZESTRIL) 10 MG tablet TAKE 1 TABLET (10 MG) BY MOUTH DAILY 90 tablet 3     simvastatin (ZOCOR) 20 MG tablet Take 1 tablet (20 mg) by mouth At Bedtime 90 tablet 3     varenicline (CHANTIX STARTING MONTH PAK) 0.5 MG X 11 & 1 MG X 42 tablet Take 0.5 mg tab daily for 3 days, then 0.5 mg tab twice daily for 4 days, then 1 mg twice daily. 53 tablet 1     venlafaxine (EFFEXOR) 25 MG tablet Take 1 tablet (25 mg) by mouth 2 times daily 180 tablet 3     [DISCONTINUED] hydrochlorothiazide (HYDRODIURIL) 25 MG tablet TAKE ONE TABLET BY MOUTH ONE TIME DAILY  30 tablet 0     [DISCONTINUED] lisinopril (PRINIVIL/ZESTRIL) 10 MG tablet TAKE 1 TABLET (10 MG) BY MOUTH DAILY 90 tablet 3     [DISCONTINUED] simvastatin (ZOCOR) 20 MG tablet Take 1 tablet (20 mg) by mouth At Bedtime 90 tablet 3     [DISCONTINUED] venlafaxine (EFFEXOR) 25 MG tablet Take 1 tablet (25 mg) by mouth 2 times daily 180 tablet 3     Allergies   Allergen Reactions     Zoloft Rash     BP Readings from Last 3 Encounters:   04/24/18 129/83   04/25/17 130/78   03/28/16 132/82    Wt Readings from Last 3 Encounters:   04/24/18 232 lb (105.2 kg)   04/25/17 229 lb (103.9 kg)   03/28/16 229 lb (103.9 kg)     "              Labs reviewed in EPIC    Reviewed and updated as needed this visit by clinical staff  Allergies  Meds       Reviewed and updated as needed this visit by Provider         ROS:  Constitutional, neuro, ENT, endocrine, pulmonary, cardiac, gastrointestinal, genitourinary, musculoskeletal, integument and psychiatric systems are negative, except as otherwise noted.    OBJECTIVE:                                                    /83  Pulse 93  Temp 98.4  F (36.9  C) (Tympanic)  Ht 5' 2.5\" (1.588 m)  Wt 232 lb (105.2 kg)  SpO2 97%  Breastfeeding? No  BMI 41.76 kg/m2  Body mass index is 41.76 kg/(m^2).  GENERAL APPEARANCE: healthy, alert and no distress  EYES: Eyes grossly normal to inspection and conjunctivae and sclerae normal  NECK: trachea midline and normal to palpation  RESP: lungs clear to auscultation - no rales, rhonchi or wheezes  CV: regular rates and rhythm, normal S1 S2, no S3 or S4, no murmur, click or rub, no irregular beats and peripheral pulses strong  ABDOMEN: soft, non-tender  MS: extremities normal- no gross deformities noted  SKIN: no suspicious lesions or rashes  NEURO: Normal strength and tone, mentation intact and speech normal  PSYCH: mentation appears normal    Diagnostic test results:  Diagnostic Test Results:  No results found for this or any previous visit (from the past 24 hour(s)).     ASSESSMENT/PLAN:                                                    1. Hypertension goal BP (blood pressure) < 140/90  -AT goal  -Continue current medication  -labs today  - BASIC METABOLIC PANEL  - Albumin Random Urine Quantitative with Creat Ratio  - hydrochlorothiazide (HYDRODIURIL) 25 MG tablet; Take 1 tablet (25 mg) by mouth daily  Dispense: 90 tablet; Refill: 3  - lisinopril (PRINIVIL/ZESTRIL) 10 MG tablet; TAKE 1 TABLET (10 MG) BY MOUTH DAILY  Dispense: 90 tablet; Refill: 3  - LDL cholesterol direct    2. Hypercholesterolemia  -LDL in 2016 was 124  - simvastatin (ZOCOR) 20 MG " tablet; Take 1 tablet (20 mg) by mouth At Bedtime  Dispense: 90 tablet; Refill: 3  - LDL cholesterol direct    The 10-year ASCVD risk score ([place] DC Jr, et al., 2013) is: 9.6%    Values used to calculate the score:      Age: 62 years      Sex: Female      Is Non- : No      Diabetic: No      Tobacco smoker: Yes      Systolic Blood Pressure: 129 mmHg      Is BP treated: Yes      HDL Cholesterol: 61 mg/dL      Total Cholesterol: 204 mg/dL      3. Major depressive disorder, recurrent episode, mild (H)  -Stable on current dose  - venlafaxine (EFFEXOR) 25 MG tablet; Take 1 tablet (25 mg) by mouth 2 times daily  Dispense: 180 tablet; Refill: 3    4. Morbid obesity with BMI of 40.0-44.9, adult (H)  -Has gained some weight  -Goal of 5 pound weight loss discussed  -150 minutes or moderate physical activity per week    5. Screen for colon cancer  - Fecal colorectal cancer screen (FIT); Future    6. Tobacco dependency  - varenicline (CHANTIX STARTING MONTH JAIME) 0.5 MG X 11 & 1 MG X 42 tablet; Take 0.5 mg tab daily for 3 days, then 0.5 mg tab twice daily for 4 days, then 1 mg twice daily.  Dispense: 53 tablet; Refill: 1    7. Encounter for immunization  - Pneumococcal vaccine 23 valent PPSV23  (Pneumovax) [17742]  - ADMIN 1st VACCINE      Follow up with Provider - 1 year, sooner if any changes or concerns.      Ana Arboleda MD MPH    [facility]

## 2018-04-24 NOTE — LETTER
April 25, 2018      Roxie Woods  [address] [facility] [address]        Dear Roxie Woods,    Electrolytes, glucose and kidney function are normal. LDL cholesterol is at goal for you.  Plan of care and follow up as discussed in clinic.   Please let me know if you have any questions and thank you for choosing [facility].        Resulted Orders   BASIC METABOLIC PANEL   Result Value Ref Range    Sodium 137 133 - 144 mmol/L    Potassium 4.2 3.4 - 5.3 mmol/L    Chloride 102 94 - 109 mmol/L    Carbon Dioxide 25 20 - 32 mmol/L    Anion Gap 10 3 - 14 mmol/L    Glucose 98 70 - 99 mg/dL      Comment:      Non Fasting    Urea Nitrogen 14 7 - 30 mg/dL    Creatinine 0.66 0.52 - 1.04 mg/dL    GFR Estimate >90 >60 mL/min/1.7m2      Comment:      Non  GFR Calc    GFR Estimate If Black >90 >60 mL/min/1.7m2      Comment:       GFR Calc    Calcium 9.3 8.5 - 10.1 mg/dL   LDL cholesterol direct   Result Value Ref Range    LDL Cholesterol Direct 125 (H) <100 mg/dL      Comment:      Above desirable:  100-129 mg/dl  Borderline High:  130-159 mg/dL  High:             160-189 mg/dL  Very high:       >189 mg/dl         If you have any questions or concerns, please call the clinic at the number listed above.       Sincerely,        Ana Arboleda MD/EDITH

## 2018-04-24 NOTE — LETTER
[address] [address]  [phone]                                                                                                           Roxie Woods  [address] [facility] [address]    April 25, 2018    Dear Roxie Woods,     Urine sample did not show any abnormal protein. Plan of care and follow up as discussed in clinic.     Regards,     Ana Arboleda MD MPH/smj    Results for orders placed or performed in visit on 04/24/18   BASIC METABOLIC PANEL   Result Value Ref Range    Sodium 137 133 - 144 mmol/L    Potassium 4.2 3.4 - 5.3 mmol/L    Chloride 102 94 - 109 mmol/L    Carbon Dioxide 25 20 - 32 mmol/L    Anion Gap 10 3 - 14 mmol/L    Glucose 98 70 - 99 mg/dL    Urea Nitrogen 14 7 - 30 mg/dL    Creatinine 0.66 0.52 - 1.04 mg/dL    GFR Estimate >90 >60 mL/min/1.7m2    GFR Estimate If Black >90 >60 mL/min/1.7m2    Calcium 9.3 8.5 - 10.1 mg/dL   Albumin Random Urine Quantitative with Creat Ratio   Result Value Ref Range    Creatinine Urine 15 mg/dL    Albumin Urine mg/L <5 mg/L    Albumin Urine mg/g Cr Unable to calculate due to low value 0 - 25 mg/g Cr   LDL cholesterol direct   Result Value Ref Range    LDL Cholesterol Direct 125 (H) <100 mg/dL

## 2018-04-25 ASSESSMENT — PATIENT HEALTH QUESTIONNAIRE - PHQ9: SUM OF ALL RESPONSES TO PHQ QUESTIONS 1-9: 0

## 2018-04-25 ASSESSMENT — ANXIETY QUESTIONNAIRES: GAD7 TOTAL SCORE: 0

## 2018-04-25 NOTE — PROGRESS NOTES
Please send a letter:    Dear Roxie Woods,    Electrolytes, glucose and kidney function are normal. LDL cholesterol is at goal for you.  Plan of care and follow up as discussed in clinic.   Please let me know if you have any questions and thank you for choosing [facility].    Regards,    Ana Arboleda MD MPH

## 2018-04-25 NOTE — PROGRESS NOTES
Please send a letter:    Dear Roxie Woods,    Urine sample did not show any abnormal protein. Plan of care and follow up as discussed in clinic.     Regards,    Ana Arboleda MD MPH

## 2018-08-24 ENCOUNTER — TELEPHONE (OUTPATIENT)
Dept: FAMILY MEDICINE | Facility: CLINIC | Age: 63
End: 2018-08-24

## 2018-08-24 NOTE — TELEPHONE ENCOUNTER
Panel Management Review      BP Readings from Last 1 Encounters:   04/24/18 129/83      Last Office Visit with this department: 4/24/2018    Patient is due/failing the following:   COLONOSCOPY and MAMMOGRAM    Action needed:   Patient needs to do Mammogram and Colonoscopy.    Type of outreach:    Sent letter.    Questions for provider review:    None                                                                                                                                    Emily Guadarrama MA

## 2018-08-24 NOTE — LETTER
August 24, 2018      Roxie Woods  [address] [facility] [address]      Dear Roxie Woods,      At [facility] we care about your health and are committed to providing quality patient care, which includes staying current on preventative cancer screenings.  You can increase your chances of finding and treating cancers through regular screenings.      Our records show that you are due for the following screening(s):    Colonoscopy for colon cancer  Specialty Schedule Number [phone]  Recommended every ten years for everyone age 50 and older  We strongly urge our patient's to consider having a colonoscopy done, which is the best screening test available and only needs to be done every 10 years if normal.      Other option is that you can do a FIT and this is once a year.  Any questions or concern, please contact us at [phone].    Mammogram for breast cancer   Location:  [facility] [phone]  [facility] [phone]  Recommended every 1-2 years for women age 50 and older  Mammograms help detect breast cancer, which is the most common cancer among women in the [place].  You may need to start having mammograms earlier and more often if you have had breast cancer, breast problems, or a family history of breast cancer.     You may contact the closest location to schedule the screening test(s) at your earliest convenience.    If you have already had one or all of the above screening tests at another facility, please call us so that we may update your chart.      Sincerely,         Ana Arboleda MD/ NS  [facility]

## 2018-09-18 PROCEDURE — 82274 ASSAY TEST FOR BLOOD FECAL: CPT | Performed by: PREVENTIVE MEDICINE

## 2018-09-21 DIAGNOSIS — Z12.11 SCREEN FOR COLON CANCER: ICD-10-CM

## 2018-09-22 LAB — HEMOCCULT STL QL IA: NEGATIVE

## 2018-09-24 NOTE — PROGRESS NOTES
Please send a letter:    Dear Roxie Woods,    Stool test did not show any blood. Plan to recheck once a year as part of colon cancer screening.  Please let me know if you have any questions and thank you for choosing [facility].      Regards,    Ana Arboleda MD MPH

## 2019-04-16 DIAGNOSIS — I10 HYPERTENSION GOAL BP (BLOOD PRESSURE) < 140/90: ICD-10-CM

## 2019-04-16 DIAGNOSIS — F33.0 MAJOR DEPRESSIVE DISORDER, RECURRENT EPISODE, MILD (H): ICD-10-CM

## 2019-04-16 DIAGNOSIS — E78.00 HYPERCHOLESTEROLEMIA: ICD-10-CM

## 2019-04-16 NOTE — TELEPHONE ENCOUNTER
"Requested Prescriptions   Pending Prescriptions Disp Refills     hydrochlorothiazide (HYDRODIURIL) 25 MG tablet [Pharmacy Med Name: hydroCHLOROthiazide Oral Tablet 25 MG]  Last Written Prescription Date:  04/24/18  Last Fill Quantity: 90,  # refills: 3   Last Office Visit with [facility], [facility] or [facility] prescribing provider:  04/24/18Kelsea   Future Office Visit:    30 tablet 2     Sig: TAKE ONE TABLET BY MOUTH ONE TIME DAILY       Diuretics (Including Combos) Protocol Passed - 4/16/2019  7:02 AM        Passed - Blood pressure under 140/90 in past 12 months     BP Readings from Last 3 Encounters:   04/24/18 129/83   04/25/17 130/78   03/28/16 132/82                 Passed - Recent (12 mo) or future (30 days) visit within the authorizing provider's specialty     Patient had office visit in the last 12 months or has a visit in the next 30 days with authorizing provider or within the authorizing provider's specialty.  See \"Patient Info\" tab in inbasket, or \"Choose Columns\" in Meds & Orders section of the refill encounter.              Passed - Medication is active on med list        Passed - Patient is age 18 or older        Passed - No active pregancy on record        Passed - Normal serum creatinine on file in past 12 months     Recent Labs   Lab Test 04/24/18  1136   CR 0.66              Passed - Normal serum potassium on file in past 12 months     Recent Labs   Lab Test 04/24/18  1136   POTASSIUM 4.2                    Passed - Normal serum sodium on file in past 12 months     Recent Labs   Lab Test 04/24/18  1136                 Passed - No positive pregnancy test in past 12 months        simvastatin (ZOCOR) 20 MG tablet [Pharmacy Med Name: Simvastatin Oral Tablet 20 MG]  Last Written Prescription Date:  04/24/18  Last Fill Quantity: 90,  # refills: 3   Last Office Visit with [facility], [facility] or [facility] prescribing provider:  04/24/18Kelsea   Future Office Visit:    30 tablet 2     Sig: TAKE ONE TABLET BY MOUTH AT " "BEDTIME       Statins Protocol Passed - 4/16/2019  7:02 AM        Passed - LDL on file in past 12 months     Recent Labs   Lab Test 04/24/18  1136   *             Passed - No abnormal creatine kinase in past 12 months     No lab results found.             Passed - Recent (12 mo) or future (30 days) visit within the authorizing provider's specialty     Patient had office visit in the last 12 months or has a visit in the next 30 days with authorizing provider or within the authorizing provider's specialty.  See \"Patient Info\" tab in inbasket, or \"Choose Columns\" in Meds & Orders section of the refill encounter.              Passed - Medication is active on med list        Passed - Patient is age 18 or older        Passed - No active pregnancy on record        Passed - No positive pregnancy test in past 12 months        venlafaxine (EFFEXOR) 25 MG tablet [Pharmacy Med Name: Venlafaxine HCl Oral Tablet 25 MG]  Last Written Prescription Date:  04/24/18  Last Fill Quantity: 180,  # refills: 3   Last Office Visit with [facility], [facility] or [facility] prescribing provider:  04/24/18Sushant   Future Office Visit:    60 tablet 2     Sig: TAKE ONE TABLET BY MOUTH TWICE DAILY       Serotonin-Norepinephrine Reuptake Inhibitors  Failed - 4/16/2019  7:02 AM        Failed - PHQ-9 score of less than 5 in past 6 months     Please review last PHQ-9 score.           Failed - Recent (6 mo) or future (30 days) visit within the authorizing provider's specialty     Patient had office visit in the last 6 months or has a visit in the next 30 days with authorizing provider or within the authorizing provider's specialty.  See \"Patient Info\" tab in inbasket, or \"Choose Columns\" in Meds & Orders section of the refill encounter.            Passed - Blood pressure under 140/90 in past 12 months     BP Readings from Last 3 Encounters:   04/24/18 129/83   04/25/17 130/78   03/28/16 132/82                 Passed - Medication is active on med list        " "Passed - Patient is age 18 or older        Passed - No active pregnancy on record        Passed - Normal serum creatinine on file in past 12 months     Recent Labs   Lab Test 04/24/18  1136   CR 0.66             Passed - No positive pregnancy test in past 12 months        lisinopril (PRINIVIL/ZESTRIL) 10 MG tablet [Pharmacy Med Name: Lisinopril Oral Tablet 10 MG]  Last Written Prescription Date:  04/24/18  Last Fill Quantity: 90,  # refills: 3   Last Office Visit with [facility], [facility] or [facility] prescribing provider:  04/24/18Sushant   Future Office Visit:    30 tablet 2     Sig: TAKE ONE TABLET BY MOUTH ONE TIME DAILY       ACE Inhibitors (Including Combos) Protocol Passed - 4/16/2019  7:02 AM        Passed - Blood pressure under 140/90 in past 12 months     BP Readings from Last 3 Encounters:   04/24/18 129/83   04/25/17 130/78   03/28/16 132/82                 Passed - Recent (12 mo) or future (30 days) visit within the authorizing provider's specialty     Patient had office visit in the last 12 months or has a visit in the next 30 days with authorizing provider or within the authorizing provider's specialty.  See \"Patient Info\" tab in inbasket, or \"Choose Columns\" in Meds & Orders section of the refill encounter.              Passed - Medication is active on med list        Passed - Patient is age 18 or older        Passed - No active pregnancy on record        Passed - Normal serum creatinine on file in past 12 months     Recent Labs   Lab Test 04/24/18  1136   CR 0.66             Passed - Normal serum potassium on file in past 12 months     Recent Labs   Lab Test 04/24/18  1136   POTASSIUM 4.2             Passed - No positive pregnancy test within past 12 months          "

## 2019-04-17 RX ORDER — VENLAFAXINE 25 MG/1
TABLET ORAL
Qty: 60 TABLET | Refills: 0 | Status: SHIPPED | OUTPATIENT
Start: 2019-04-17 | End: 2019-04-23

## 2019-04-17 RX ORDER — SIMVASTATIN 20 MG
TABLET ORAL
Qty: 30 TABLET | Refills: 0 | Status: SHIPPED | OUTPATIENT
Start: 2019-04-17 | End: 2019-04-23

## 2019-04-17 RX ORDER — HYDROCHLOROTHIAZIDE 25 MG/1
TABLET ORAL
Qty: 30 TABLET | Refills: 0 | Status: SHIPPED | OUTPATIENT
Start: 2019-04-17 | End: 2019-04-23

## 2019-04-17 RX ORDER — LISINOPRIL 10 MG/1
TABLET ORAL
Qty: 30 TABLET | Refills: 0 | Status: SHIPPED | OUTPATIENT
Start: 2019-04-17 | End: 2019-04-23

## 2019-04-17 NOTE — TELEPHONE ENCOUNTER
Medication is being filled for 1 time refill only due to:  Patient needs to be seen. TC to call and help schedule.    Polina Barbosa RN, [facility]

## 2019-04-17 NOTE — TELEPHONE ENCOUNTER
Called and spoke to patient and scheduled a recheck appt with Dr Arboleda on 4/23/19 at 11:00 am.  Radha Tracy MA/  [facility]

## 2019-04-23 ENCOUNTER — OFFICE VISIT (OUTPATIENT)
Dept: FAMILY MEDICINE | Facility: CLINIC | Age: 64
End: 2019-04-23
Payer: COMMERCIAL

## 2019-04-23 VITALS
BODY MASS INDEX: 39.51 KG/M2 | HEIGHT: 63 IN | HEART RATE: 92 BPM | OXYGEN SATURATION: 97 % | WEIGHT: 223 LBS | SYSTOLIC BLOOD PRESSURE: 126 MMHG | DIASTOLIC BLOOD PRESSURE: 82 MMHG | TEMPERATURE: 97.7 F

## 2019-04-23 DIAGNOSIS — I10 HYPERTENSION GOAL BP (BLOOD PRESSURE) < 140/90: Primary | ICD-10-CM

## 2019-04-23 DIAGNOSIS — Z12.31 VISIT FOR SCREENING MAMMOGRAM: ICD-10-CM

## 2019-04-23 DIAGNOSIS — E78.00 HYPERCHOLESTEROLEMIA: ICD-10-CM

## 2019-04-23 DIAGNOSIS — F33.0 MAJOR DEPRESSIVE DISORDER, RECURRENT EPISODE, MILD (H): ICD-10-CM

## 2019-04-23 DIAGNOSIS — E66.01 MORBID OBESITY WITH BMI OF 40.0-44.9, ADULT (H): ICD-10-CM

## 2019-04-23 LAB
ANION GAP SERPL CALCULATED.3IONS-SCNC: 7 MMOL/L (ref 3–14)
BUN SERPL-MCNC: 15 MG/DL (ref 7–30)
CALCIUM SERPL-MCNC: 9.7 MG/DL (ref 8.5–10.1)
CHLORIDE SERPL-SCNC: 104 MMOL/L (ref 94–109)
CHOLEST SERPL-MCNC: 195 MG/DL
CO2 SERPL-SCNC: 26 MMOL/L (ref 20–32)
CREAT SERPL-MCNC: 0.6 MG/DL (ref 0.52–1.04)
CREAT UR-MCNC: 15 MG/DL
GFR SERPL CREATININE-BSD FRML MDRD: >90 ML/MIN/{1.73_M2}
GLUCOSE SERPL-MCNC: 91 MG/DL (ref 70–99)
HBA1C MFR BLD: 5.3 % (ref 0–5.6)
HDLC SERPL-MCNC: 52 MG/DL
LDLC SERPL CALC-MCNC: 113 MG/DL
MICROALBUMIN UR-MCNC: <5 MG/L
MICROALBUMIN/CREAT UR: NORMAL MG/G CR (ref 0–25)
NONHDLC SERPL-MCNC: 143 MG/DL
POTASSIUM SERPL-SCNC: 4.5 MMOL/L (ref 3.4–5.3)
SODIUM SERPL-SCNC: 137 MMOL/L (ref 133–144)
TRIGL SERPL-MCNC: 152 MG/DL

## 2019-04-23 PROCEDURE — 99214 OFFICE O/P EST MOD 30 MIN: CPT | Performed by: PREVENTIVE MEDICINE

## 2019-04-23 PROCEDURE — 80048 BASIC METABOLIC PNL TOTAL CA: CPT | Performed by: PREVENTIVE MEDICINE

## 2019-04-23 PROCEDURE — 80061 LIPID PANEL: CPT | Performed by: PREVENTIVE MEDICINE

## 2019-04-23 PROCEDURE — 83036 HEMOGLOBIN GLYCOSYLATED A1C: CPT | Performed by: PREVENTIVE MEDICINE

## 2019-04-23 PROCEDURE — 82043 UR ALBUMIN QUANTITATIVE: CPT | Performed by: PREVENTIVE MEDICINE

## 2019-04-23 PROCEDURE — 36415 COLL VENOUS BLD VENIPUNCTURE: CPT | Performed by: PREVENTIVE MEDICINE

## 2019-04-23 RX ORDER — LISINOPRIL 10 MG/1
TABLET ORAL
Qty: 90 TABLET | Refills: 3 | Status: SHIPPED | OUTPATIENT
Start: 2019-04-23 | End: 2020-05-13

## 2019-04-23 RX ORDER — HYDROCHLOROTHIAZIDE 25 MG/1
25 TABLET ORAL DAILY
Qty: 90 TABLET | Refills: 3 | Status: SHIPPED | OUTPATIENT
Start: 2019-04-23 | End: 2020-05-13

## 2019-04-23 RX ORDER — SIMVASTATIN 20 MG
TABLET ORAL
Qty: 90 TABLET | Refills: 3 | Status: SHIPPED | OUTPATIENT
Start: 2019-04-23 | End: 2020-05-13

## 2019-04-23 RX ORDER — VENLAFAXINE 25 MG/1
25 TABLET ORAL 2 TIMES DAILY
Qty: 180 TABLET | Refills: 3 | Status: SHIPPED | OUTPATIENT
Start: 2019-04-23 | End: 2020-05-13

## 2019-04-23 ASSESSMENT — PAIN SCALES - GENERAL: PAINLEVEL: NO PAIN (0)

## 2019-04-23 ASSESSMENT — MIFFLIN-ST. JEOR: SCORE: 1532.71

## 2019-04-23 ASSESSMENT — PATIENT HEALTH QUESTIONNAIRE - PHQ9: SUM OF ALL RESPONSES TO PHQ QUESTIONS 1-9: 0

## 2019-04-23 NOTE — PROGRESS NOTES
SUBJECTIVE:   Roxie Woods is a 63 year old female who presents to clinic today for the following   health issues:      Hyperlipidemia Follow-Up      Rate your low fat/cholesterol diet?: not monitoring fat    Taking statin?  Yes, no muscle aches from statin    Other lipid medications/supplements?:  none    Hypertension Follow-up      Outpatient blood pressures are not being checked.     Low Salt Diet: low salt    Depression Followup    Status since last visit: Stable     See PHQ-9 for current symptoms.  Other associated symptoms: None    Complicating factors:   Significant life event:  No   Current substance abuse:  None  Anxiety or Panic symptoms:  No    PHQ 4/25/2017 4/24/2018 4/23/2019   PHQ-9 Total Score 0 0 0   Q9: Thoughts of better off dead/self-harm past 2 weeks Not at all Not at all Not at all     In the past two weeks have you had thoughts of suicide or self-harm?  No.    Do you have concerns about your personal safety or the safety of others?   No  PHQ-9  English  PHQ-9   Any Language  Suicide Assessment Five-step Evaluation and Treatment (SAFE-T)    Amount of exercise or physical activity: 6-7 days/week for an average of 15-30 minutes    Problems taking medications regularly: No    Medication side effects: none    Diet: low salt    Additional history: as documented    Reviewed  and updated as needed this visit by clinical staff  Tobacco  Allergies  Meds  Problems  Med Hx  Surg Hx  Fam Hx         Reviewed and updated as needed this visit by Provider  Tobacco  Allergies  Meds  Problems  Med Hx  Surg Hx  Fam Hx         Patient Active Problem List   Diagnosis     Family history of factor V Leiden mutation     Factor 5 Leiden mutation, heterozygous (H)     Hypertension goal BP (blood pressure) < 140/90     DUB (dysfunctional uterine bleeding)     Tobacco dependency     Menopausal syndrome (hot flashes)     Advanced directives, counseling/discussion     Hypercholesterolemia     Elevated  "fasting blood sugar     Mild major depression (H)     Menopausal symptoms     Hyperlipidemia LDL goal <130     Morbid obesity with BMI of 40.0-44.9, adult (H)     Past Surgical History:   Procedure Laterality Date     C NONSPECIFIC PROCEDURE      Toenails     CARPAL TUNNEL RELEASE RT/LT  11/17/99    RT       Social History     Tobacco Use     Smoking status: Current Every Day Smoker     Packs/day: 1.00     Years: 20.00     Pack years: 20.00     Types: Cigarettes     Smokeless tobacco: Never Used   Substance Use Topics     Alcohol use: Yes     Family History   Problem Relation Age of Onset     Diabetes Mother      Breast Cancer Mother      Cancer - colorectal Father      Prostate Cancer Father      Diabetes Maternal Grandfather          Current Outpatient Medications   Medication Sig Dispense Refill     hydrochlorothiazide (HYDRODIURIL) 25 MG tablet Take 1 tablet (25 mg) by mouth daily 90 tablet 3     lisinopril (PRINIVIL/ZESTRIL) 10 MG tablet TAKE ONE TABLET BY MOUTH ONE TIME DAILY 90 tablet 3     simvastatin (ZOCOR) 20 MG tablet TAKE ONE TABLET BY MOUTH AT BEDTIME 90 tablet 3     venlafaxine (EFFEXOR) 25 MG tablet Take 1 tablet (25 mg) by mouth 2 times daily 180 tablet 3     Allergies   Allergen Reactions     Zoloft Rash     BP Readings from Last 3 Encounters:   04/23/19 126/82   04/24/18 129/83   04/25/17 130/78    Wt Readings from Last 3 Encounters:   04/23/19 101.2 kg (223 lb)   04/24/18 105.2 kg (232 lb)   04/25/17 103.9 kg (229 lb)                  Labs reviewed in EPIC    ROS:  Constitutional, neuro, ENT, endocrine, pulmonary, cardiac, gastrointestinal, genitourinary, musculoskeletal, integument and psychiatric systems are negative, except as otherwise noted.    OBJECTIVE:                                                    /82   Pulse 92   Temp 97.7  F (36.5  C) (Oral)   Ht 1.588 m (5' 2.5\")   Wt 101.2 kg (223 lb)   SpO2 97%   Breastfeeding? No   BMI 40.14 kg/m    Body mass index is 40.14 " kg/m .      GENERAL APPEARANCE: healthy, alert and no distress  EYES: Eyes grossly normal to inspection and conjunctivae and sclerae normal  HENT: nose and mouth without ulcers or lesions  NECK: no adenopathy and trachea midline and normal to palpation  RESP: lungs clear to auscultation - no rales, rhonchi or wheezes  CV: regular rates and rhythm, normal S1 S2, no S3 or S4 and no murmur, click or rub  ABDOMEN: soft, non-tender and no rebound or guarding   MS: extremities normal- no gross deformities noted and peripheral pulses normal  SKIN: no suspicious lesions or rashes  NEURO: Normal strength and tone, mentation intact and speech normal  PSYCH: mentation appears normal      Diagnostic test results:  Diagnostic Test Results:  Results for orders placed or performed in visit on 04/23/19 (from the past 24 hour(s))   Hemoglobin A1c   Result Value Ref Range    Hemoglobin A1C 5.3 0 - 5.6 %        ASSESSMENT/PLAN:                                                    1. Hypertension goal BP (blood pressure) < 140/90  -At goal  -continue current medication   - hydrochlorothiazide (HYDRODIURIL) 25 MG tablet; Take 1 tablet (25 mg) by mouth daily  Dispense: 90 tablet; Refill: 3  - lisinopril (PRINIVIL/ZESTRIL) 10 MG tablet; TAKE ONE TABLET BY MOUTH ONE TIME DAILY  Dispense: 90 tablet; Refill: 3  - BASIC METABOLIC PANEL  - Albumin Random Urine Quantitative with Creat Ratio  - Hemoglobin A1c    2. Hypercholesterolemia  - simvastatin (ZOCOR) 20 MG tablet; TAKE ONE TABLET BY MOUTH AT BEDTIME  Dispense: 90 tablet; Refill: 3  - Lipid panel reflex to direct LDL Non-fasting  - Hemoglobin A1c    3. Major depressive disorder, recurrent episode, mild (H)  -refills on medication provided  - venlafaxine (EFFEXOR) 25 MG tablet; Take 1 tablet (25 mg) by mouth 2 times daily  Dispense: 180 tablet; Refill: 3    4. Morbid obesity with BMI of 40.0-44.9, adult (H)  -Has lost some weight compared to last year     5. Visit for screening mammogram  -  MA SCREENING DIGITAL BILAT - Future  (s+30); Future      Follow up with Provider - one year, sooner if milli changes or concerns      Ana Arboleda MD MPH    [facility]

## 2019-04-23 NOTE — LETTER
April 24, 2019      Roxie Woods  [address] [place] [address]        Dear Roxie Woods     Here are your cholesterol results:     Your LDL is: Lab Results        Component                Value               Date                        LDL                      113                 04/23/2019          Your LDL goal is to be less than 130   Your HDL is: Lab Results        Component                Value               Date                        HDL                      52                  04/23/2019           Goal HDL is Greater than 40 (for men) or 50 (for women).   Your Triglycerides are: Lab Results        Component                Value               Date                        TRIG                     152                 04/23/2019         Goal TRIGLYCERIDES are less than 150.       Here are some ways to improve your cholesterol:     Try to get at least 45 minutes of aerobic exercise 5-6 days a week   Maintain a healthy body weight   Eat less saturated fats   Buy lean cuts of meat, reduce your portions of red meat or substitute poultry or fish   Avoid fried or fast foods that are high in fat   Eat more fruits and vegetables     Urine sample did not show any abnormal protein.   Electrolytes, glucose and kidney function are normal.   You do not have diabetes or pre diabetes.   Plan of care and follow up as discussed in clinic.   Please let me know if you have any questions and thank you for choosing [facility].         Resulted Orders   BASIC METABOLIC PANEL   Result Value Ref Range    Sodium 137 133 - 144 mmol/L    Potassium 4.5 3.4 - 5.3 mmol/L    Chloride 104 94 - 109 mmol/L    Carbon Dioxide 26 20 - 32 mmol/L    Anion Gap 7 3 - 14 mmol/L    Glucose 91 70 - 99 mg/dL      Comment:      Non Fasting    Urea Nitrogen 15 7 - 30 mg/dL    Creatinine 0.60 0.52 - 1.04 mg/dL    GFR Estimate >90 >60 mL/min/[1.73_m2]      Comment:      Non  GFR Calc  Starting 12/18/2018, serum creatinine  based estimated GFR (eGFR) will be   calculated using the Chronic Kidney Disease Epidemiology Collaboration   (CKD-EPI) equation.      GFR Estimate If Black >90 >60 mL/min/[1.73_m2]      Comment:       GFR Calc  Starting 12/18/2018, serum creatinine based estimated GFR (eGFR) will be   calculated using the Chronic Kidney Disease Epidemiology Collaboration   (CKD-EPI) equation.      Calcium 9.7 8.5 - 10.1 mg/dL   Albumin Random Urine Quantitative with Creat Ratio   Result Value Ref Range    Creatinine Urine 15 mg/dL    Albumin Urine mg/L <5 mg/L    Albumin Urine mg/g Cr Unable to calculate due to low value 0 - 25 mg/g Cr   Lipid panel reflex to direct LDL Non-fasting   Result Value Ref Range    Cholesterol 195 <200 mg/dL    Triglycerides 152 (H) <150 mg/dL      Comment:      Borderline high:  150-199 mg/dl  High:             200-499 mg/dl  Very high:       >499 mg/dl  Non Fasting      HDL Cholesterol 52 >49 mg/dL    LDL Cholesterol Calculated 113 (H) <100 mg/dL      Comment:      Above desirable:  100-129 mg/dl  Borderline High:  130-159 mg/dL  High:             160-189 mg/dL  Very high:       >189 mg/dl      Non HDL Cholesterol 143 (H) <130 mg/dL      Comment:      Above Desirable:  130-159 mg/dl  Borderline high:  160-189 mg/dl  High:             190-219 mg/dl  Very high:       >219 mg/dl     Hemoglobin A1c   Result Value Ref Range    Hemoglobin A1C 5.3 0 - 5.6 %      Comment:      Normal <5.7% Prediabetes 5.7-6.4%  Diabetes 6.5% or higher - adopted from ADA   consensus guidelines.         If you have any questions or concerns, please call the clinic at the number listed above.       Sincerely,        Ana Arboleda MD/EDITH

## 2019-04-23 NOTE — PATIENT INSTRUCTIONS
At [facility], we strive to deliver an exceptional experience to you, every time we see you.  If you receive a survey in the mail, please send us back your thoughts. We really do value your feedback.    Your care team:                            Family Medicine Internal Medicine   MD Valdo Avelar MD Shantel Branch-Fleming, MD Katya Georgiev PA-C Megan Hill, APRN DARRON Asencio MD Pediatrics   Prudencio Beauchamp, ROLDAN Mazariegos, MD Scarlet Fritz APRN CNP   MD Keya [facility] MD Deborah Mielke, MD Cookie Kendrick, APRN [facility]      Clinic hours: Monday - Thursday 7 am-7 pm; Fridays 7 am-5 pm.   Urgent care: Monday - Friday 11 am-9 pm; Saturday and Sunday 9 am-5 pm.  Pharmacy : Monday -Thursday 8 am-8 pm; Friday 8 am-6 pm; Saturday and Sunday 9 am-5 pm.     Clinic: [phone]   Pharmacy: [phone]

## 2019-04-23 NOTE — PROGRESS NOTES
"  SUBJECTIVE:   Roxie Woods is a 63 year old female who presents to clinic today for the following   health issues:      Hyperlipidemia Follow-Up      Rate your low fat/cholesterol diet?: { :992652::\"good\"}    Taking statin?  { :327356::\"No\"}    Other lipid medications/supplements?:  { :992568::\"none\"}    Hypertension Follow-up      Outpatient blood pressures {ISCHECKIN}    Low Salt Diet: { :110696::\"no added salt\"}    Depression Followup    Status since last visit: {STABLE/WORSENED/IMPROVED:434165::\"Stable ***\"}    See PHQ-9 for current symptoms.  Other associated symptoms: {NONE DEFAULTED:542532::\"None\"}    Complicating factors:   Significant life event:  {NO/YES :939016::\"No\"}   Current substance abuse:  {Substance Use:324174::\"None\"}  Anxiety or Panic symptoms:  {NO/YES :906251::\"No\"}    PHQ 3/28/2016 2017 2018   PHQ-9 Total Score 0 0 0   Q9: Thoughts of better off dead/self-harm past 2 weeks Not at all Not at all Not at all     {PROVIDER ONLY Complete follow-up questions for patients who report suicide ideation  (Optional):385454}  PHQ-9  English  PHQ-9   Any Language  Suicide Assessment Five-step Evaluation and Treatment (SAFE-T)    Amount of exercise or physical activity: {Exercise frequency days per week:538783}    Problems taking medications regularly: {Med Problems:139088::\"No\"}    Medication side effects: {CHRONIC MED SIDE EFFECTS:964313::\"none\"}    Diet: { :459628}        {additional problems for provider to add:128058}    Additional history: {NONE - AS DOCUMENTED:262853::\"as documented\"}    Reviewed  and updated as needed this visit by clinical staff  Allergies  Meds         Reviewed and updated as needed this visit by Provider         {HIST REVIEW/ LINKS 2:961934}    {PROVIDER CHARTING PREFERENCE:458960}        "

## 2019-04-24 NOTE — RESULT ENCOUNTER NOTE
Please send a letter:    Dear Roxie Woods    Here are your cholesterol results:    Your LDL is: Lab Results       Component                Value               Date                       LDL                      113                 04/23/2019          Your LDL goal is to be less than 130  Your HDL is: Lab Results       Component                Value               Date                       HDL                      52                  04/23/2019           Goal HDL is Greater than 40 (for men) or 50 (for women).  Your Triglycerides are: Lab Results       Component                Value               Date                       TRIG                     152                 04/23/2019         Goal TRIGLYCERIDES are less than 150.       Here are some ways to improve your cholesterol:    Try to get at least 45 minutes of aerobic exercise 5-6 days a week  Maintain a healthy body weight  Eat less saturated fats  Buy lean cuts of meat, reduce your portions of red meat or substitute poultry or fish  Avoid fried or fast foods that are high in fat  Eat more fruits and vegetables    Urine sample did not show any abnormal protein.  Electrolytes, glucose and kidney function are normal.  You do not have diabetes or pre diabetes.  Plan of care and follow up as discussed in clinic.  Please let me know if you have any questions and thank you for choosing [facility].    Regards,    Ana Arboleda MD MPH

## 2019-05-02 ENCOUNTER — ANCILLARY PROCEDURE (OUTPATIENT)
Dept: MAMMOGRAPHY | Facility: OTHER | Age: 64
End: 2019-05-02
Payer: COMMERCIAL

## 2019-05-02 DIAGNOSIS — Z12.31 VISIT FOR SCREENING MAMMOGRAM: ICD-10-CM

## 2019-05-02 PROCEDURE — 77067 SCR MAMMO BI INCL CAD: CPT | Mod: TC

## 2019-08-08 ENCOUNTER — TELEPHONE (OUTPATIENT)
Dept: DERMATOLOGY | Facility: CLINIC | Age: 64
End: 2019-08-08

## 2019-08-08 NOTE — TELEPHONE ENCOUNTER
Left message for patient advised to call [phone] for previsit questions upcoming appointment on 8/20/19 at 11:15am.        1. Have you ever seen a dermatologist outside of the [facility] system or [place]?  If so, we need records  2. Any personal history of skin cancer?          Susanne

## 2020-05-09 DIAGNOSIS — F33.0 MAJOR DEPRESSIVE DISORDER, RECURRENT EPISODE, MILD (H): ICD-10-CM

## 2020-05-09 DIAGNOSIS — I10 HYPERTENSION GOAL BP (BLOOD PRESSURE) < 140/90: ICD-10-CM

## 2020-05-09 DIAGNOSIS — E78.00 HYPERCHOLESTEROLEMIA: ICD-10-CM

## 2020-05-12 NOTE — TELEPHONE ENCOUNTER
One year since last visit. Needs annual visit and labs. Please schedule. If patient needs medication prior to visit, send back to RN pool and we can give a jasbir.     Aleyda Hilliard RN  [facility]

## 2020-05-12 NOTE — TELEPHONE ENCOUNTER
This writer attempted to contact pt on 05/12/20      Reason for call schedule virtual visit med check for refill and left message.      If patient calls back:   Schedule virtual Visit appointment within 2 weeks with primary care, document that pt called and close encounter or send back to RN pool if jasbir refill needed        Trisha Asher

## 2020-05-13 ENCOUNTER — VIRTUAL VISIT (OUTPATIENT)
Dept: FAMILY MEDICINE | Facility: CLINIC | Age: 65
End: 2020-05-13
Payer: COMMERCIAL

## 2020-05-13 DIAGNOSIS — Z12.11 SPECIAL SCREENING FOR MALIGNANT NEOPLASMS, COLON: ICD-10-CM

## 2020-05-13 DIAGNOSIS — E66.01 MORBID OBESITY WITH BMI OF 40.0-44.9, ADULT (H): ICD-10-CM

## 2020-05-13 DIAGNOSIS — I10 HYPERTENSION GOAL BP (BLOOD PRESSURE) < 140/90: Primary | ICD-10-CM

## 2020-05-13 DIAGNOSIS — E78.00 HYPERCHOLESTEROLEMIA: ICD-10-CM

## 2020-05-13 DIAGNOSIS — F33.0 MAJOR DEPRESSIVE DISORDER, RECURRENT EPISODE, MILD (H): ICD-10-CM

## 2020-05-13 DIAGNOSIS — Z12.31 VISIT FOR SCREENING MAMMOGRAM: ICD-10-CM

## 2020-05-13 PROCEDURE — 96127 BRIEF EMOTIONAL/BEHAV ASSMT: CPT | Performed by: PREVENTIVE MEDICINE

## 2020-05-13 PROCEDURE — 99214 OFFICE O/P EST MOD 30 MIN: CPT | Mod: 95 | Performed by: PREVENTIVE MEDICINE

## 2020-05-13 RX ORDER — VENLAFAXINE 25 MG/1
25 TABLET ORAL 2 TIMES DAILY
Qty: 180 TABLET | Refills: 3 | Status: SHIPPED | OUTPATIENT
Start: 2020-05-13 | End: 2021-04-09

## 2020-05-13 RX ORDER — LISINOPRIL 10 MG/1
TABLET ORAL
Qty: 90 TABLET | Refills: 3 | Status: SHIPPED | OUTPATIENT
Start: 2020-05-13 | End: 2021-04-09

## 2020-05-13 RX ORDER — HYDROCHLOROTHIAZIDE 25 MG/1
25 TABLET ORAL DAILY
Qty: 90 TABLET | Refills: 3 | Status: SHIPPED | OUTPATIENT
Start: 2020-05-13 | End: 2021-04-09

## 2020-05-13 RX ORDER — SIMVASTATIN 20 MG
TABLET ORAL
Qty: 90 TABLET | Refills: 3 | Status: SHIPPED | OUTPATIENT
Start: 2020-05-13 | End: 2021-04-09

## 2020-05-13 ASSESSMENT — PATIENT HEALTH QUESTIONNAIRE - PHQ9: SUM OF ALL RESPONSES TO PHQ QUESTIONS 1-9: 0

## 2020-05-13 ASSESSMENT — PAIN SCALES - GENERAL: PAINLEVEL: NO PAIN (0)

## 2020-05-13 NOTE — PATIENT INSTRUCTIONS
At [facility], we strive to deliver an exceptional experience to you, every time we see you. If you receive a survey, please complete it as we do value your feedback.  If you have MyChart, you can expect to receive results automatically within 24 hours of their completion.  Your provider will send a note interpreting your results as well.   If you do not have MyChart, you should receive your results in about a week by mail.    Your care team:                            Family Medicine Internal Medicine   MD Valdo Avelar MD Shantel Branch-Fleming, MD Katya Georgiev PA-C Megan Hill, APRJANET Asencio, MD Pediatrics   Prudencio Beauchamp, PAJOSE Mazariegos, MD Scarlet Fritz APRN CNP   MD Keya Jefferson MD Deborah Mielke, MD Kim Thein, APRN [facility]      Clinic hours: Monday - Thursday 7 am-7 pm; Fridays 7 am-5 pm.   Urgent care: Monday - Friday 11 am-9 pm; Saturday and Sunday 9 am-5 pm.    Clinic: [phone]       [facility]: Monday - Thursday 8 am - 7 pm; Friday 8 am - 6 pm  [facility]: [phone]     Use [email or web address] for 24/7 diagnosis and treatment of dozens of conditions.

## 2020-05-13 NOTE — PROGRESS NOTES
"Roxie Woods is a 64 year old female who is being evaluated via a billable telephone visit.      The patient has been notified of following:     \"This telephone visit will be conducted via a call between you and your physician/provider. We have found that certain health care needs can be provided without the need for a physical exam.  This service lets us provide the care you need with a short phone conversation.  If a prescription is necessary we can send it directly to your pharmacy.  If lab work is needed we can place an order for that and you can then stop by our lab to have the test done at a later time.    Telephone visits are billed at different rates depending on your insurance coverage. During this emergency period, for some insurers they may be billed the same as an in-person visit.  Please reach out to your insurance provider with any questions.    If during the course of the call the physician/provider feels a telephone visit is not appropriate, you will not be charged for this service.\"    Patient has given verbal consent for Telephone visit?  Yes    What phone number would you like to be contacted at? [phone]    How would you like to obtain your AVS? Mail a copy    Subjective     Roxie Woods is a 64 year old female who presents to clinic today for the following health issues:    HPI  Hyperlipidemia Follow-Up      Are you regularly taking any medication or supplement to lower your cholesterol?   Yes- smivastatin    Are you having muscle aches or other side effects that you think could be caused by your cholesterol lowering medication?  No    Hypertension Follow-up      Do you check your blood pressure regularly outside of the clinic? No     Are you following a low salt diet? Yes    Are your blood pressures ever more than 140 on the top number (systolic) OR more   than 90 on the bottom number (diastolic), for example 140/90? N/a      How many servings of fruits and vegetables do you " eat daily?  0-1    On average, how many sweetened beverages do you drink each day (Examples: soda, juice, sweet tea, etc.  Do NOT count diet or artificially sweetened beverages)?   1    How many days per week do you exercise enough to make your heart beat faster? 3 or less    How many minutes a day do you exercise enough to make your heart beat faster? 10 - 19    How many days per week do you miss taking your medication? 0    Quit smoking with use of patches, congratulated patient on this     Depression Followup    How are you doing with your depression since your last visit? No change    Are you having other symptoms that might be associated with depression? No    Have you had a significant life event?  No     Are you feeling anxious or having panic attacks?   No    Do you have any concerns with your use of alcohol or other drugs? Yes:  per week 2-3 drinks     Sleep is good       Social History     Tobacco Use     Smoking status: Former Smoker     Packs/day: 1.00     Years: 20.00     Pack years: 20.00     Types: Cigarettes     Smokeless tobacco: Never Used   Substance Use Topics     Alcohol use: Yes     Drug use: No     PHQ 4/24/2018 4/23/2019 5/13/2020   PHQ-9 Total Score 0 0 0   Q9: Thoughts of better off dead/self-harm past 2 weeks Not at all Not at all Not at all     MARIBEL-7 SCORE 4/24/2018   Total Score 0     Last PHQ-9 5/13/2020   1.  Little interest or pleasure in doing things 0   2.  Feeling down, depressed, or hopeless 0   3.  Trouble falling or staying asleep, or sleeping too much 0   4.  Feeling tired or having little energy 0   5.  Poor appetite or overeating 0   6.  Feeling bad about yourself 0   7.  Trouble concentrating 0   8.  Moving slowly or restless 0   Q9: Thoughts of better off dead/self-harm past 2 weeks 0   PHQ-9 Total Score 0   Difficulty at work, home, or with people -     MARIBEL-7  4/24/2018   1. Feeling nervous, anxious, or on edge 0   2. Not being able to stop or control worrying 0   3.  Worrying too much about different things 0   4. Trouble relaxing 0   5. Being so restless that it is hard to sit still 0   6. Becoming easily annoyed or irritable 0   7. Feeling afraid, as if something awful might happen 0   MARIBEL-7 Total Score 0   If you checked any problems, how difficult have they made it for you to do your work, take care of things at home, or get along with other people? Not difficult at all     In the past two weeks have you had thoughts of suicide or self-harm?  No.    Do you have concerns about your personal safety or the safety of others?   No    Suicide Assessment Five-step Evaluation and Treatment (SAFE-T)      Patient Active Problem List   Diagnosis     Family history of factor V Leiden mutation     Factor 5 Leiden mutation, heterozygous (H)     Hypertension goal BP (blood pressure) < 140/90     DUB (dysfunctional uterine bleeding)     Tobacco dependency     Menopausal syndrome (hot flashes)     Advanced directives, counseling/discussion     Hypercholesterolemia     Elevated fasting blood sugar     Mild major depression (H)     Menopausal symptoms     Hyperlipidemia LDL goal <130     Morbid obesity with BMI of 40.0-44.9, adult (H)     Past Surgical History:   Procedure Laterality Date     C NONSPECIFIC PROCEDURE      Toenails     CARPAL TUNNEL RELEASE RT/LT  11/17/99    RT       Social History     Tobacco Use     Smoking status: Former Smoker     Packs/day: 1.00     Years: 20.00     Pack years: 20.00     Types: Cigarettes     Smokeless tobacco: Never Used   Substance Use Topics     Alcohol use: Yes     Family History   Problem Relation Age of Onset     Diabetes Mother      Breast Cancer Mother      Cancer - colorectal Father      Prostate Cancer Father      Diabetes Maternal Grandfather          Current Outpatient Medications   Medication Sig Dispense Refill     hydrochlorothiazide (HYDRODIURIL) 25 MG tablet Take 1 tablet (25 mg) by mouth daily 90 tablet 3     lisinopril (ZESTRIL) 10 MG  tablet TAKE ONE TABLET BY MOUTH ONE TIME DAILY 90 tablet 3     simvastatin (ZOCOR) 20 MG tablet TAKE ONE TABLET BY MOUTH AT BEDTIME 90 tablet 3     venlafaxine (EFFEXOR) 25 MG tablet Take 1 tablet (25 mg) by mouth 2 times daily 180 tablet 3     Allergies   Allergen Reactions     Zoloft Rash     BP Readings from Last 3 Encounters:   04/23/19 126/82   04/24/18 129/83   04/25/17 130/78    Wt Readings from Last 3 Encounters:   04/23/19 101.2 kg (223 lb)   04/24/18 105.2 kg (232 lb)   04/25/17 103.9 kg (229 lb)                    Reviewed and updated as needed this visit by Provider  Tobacco  Allergies  Meds  Problems  Med Hx  Surg Hx  Fam Hx  Soc Hx          Review of Systems   Constitutional, HEENT, cardiovascular, pulmonary, gi and gu systems are negative, except as otherwise noted.       Objective   Reported vitals:  Breastfeeding No    healthy, alert and no distress  PSYCH: Alert and oriented times 3; coherent speech, normal   rate and volume, able to articulate logical thoughts, able   to abstract reason, no tangential thoughts, no hallucinations   or delusions  Her affect is normal  RESP: No cough, no audible wheezing, able to talk in full sentences  Remainder of exam unable to be completed due to telephone visits    Diagnostic Test Results:  Labs reviewed in Epic  No results found for this or any previous visit (from the past 24 hour(s)).        Assessment/Plan:  1. Hypertension goal BP (blood pressure) < 140/90  -stable and at goal, continue current medication  -will come in for fasting labs   - hydrochlorothiazide (HYDRODIURIL) 25 MG tablet; Take 1 tablet (25 mg) by mouth daily  Dispense: 90 tablet; Refill: 3  - lisinopril (ZESTRIL) 10 MG tablet; TAKE ONE TABLET BY MOUTH ONE TIME DAILY  Dispense: 90 tablet; Refill: 3  - Lipid panel reflex to direct LDL Fasting; Future  - Basic metabolic panel; Future  - Albumin Random Urine Quantitative with Creat Ratio; Future    2. Morbid obesity with BMI of 40.0-44.9,  adult (H)  -Trying to get more exercise     3. Major depressive disorder, recurrent episode, mild (H)  -stable. PHQ score is 0   - venlafaxine (EFFEXOR) 25 MG tablet; Take 1 tablet (25 mg) by mouth 2 times daily  Dispense: 180 tablet; Refill: 3    4. Special screening for malignant neoplasms, colon  - Fecal colorectal cancer screen (FIT); Future    5. Hypercholesterolemia  - simvastatin (ZOCOR) 20 MG tablet; TAKE ONE TABLET BY MOUTH AT BEDTIME  Dispense: 90 tablet; Refill: 3  - Lipid panel reflex to direct LDL Fasting; Future      6. Visit for screening mammogram  - *MA Screening Digital Bilateral; Future    Return in about 1 year (around 5/13/2021) for BP Recheck, Physical Exam, Lab Work, Routine Visit.      Phone call duration:  6 minutes    Ana Arboleda MD MPH

## 2020-05-14 RX ORDER — VENLAFAXINE 25 MG/1
TABLET ORAL
Qty: 180 TABLET | Refills: 0 | OUTPATIENT
Start: 2020-05-14

## 2020-05-14 RX ORDER — SIMVASTATIN 20 MG
TABLET ORAL
Qty: 90 TABLET | Refills: 0 | OUTPATIENT
Start: 2020-05-14

## 2020-05-14 RX ORDER — HYDROCHLOROTHIAZIDE 25 MG/1
TABLET ORAL
Qty: 90 TABLET | Refills: 0 | OUTPATIENT
Start: 2020-05-14

## 2020-05-14 RX ORDER — LISINOPRIL 10 MG/1
TABLET ORAL
Qty: 90 TABLET | Refills: 0 | OUTPATIENT
Start: 2020-05-14

## 2020-05-14 NOTE — TELEPHONE ENCOUNTER
Patient had a Virtual visit with Dr Arboleda on 5/13/2020.  [facility]  2nd Floor  Primary Care

## 2020-05-28 ENCOUNTER — ANCILLARY PROCEDURE (OUTPATIENT)
Dept: MAMMOGRAPHY | Facility: OTHER | Age: 65
End: 2020-05-28
Attending: PREVENTIVE MEDICINE
Payer: COMMERCIAL

## 2020-05-28 DIAGNOSIS — Z12.31 VISIT FOR SCREENING MAMMOGRAM: ICD-10-CM

## 2020-05-28 PROCEDURE — 77063 BREAST TOMOSYNTHESIS BI: CPT | Mod: TC

## 2020-05-28 PROCEDURE — 77067 SCR MAMMO BI INCL CAD: CPT | Mod: TC

## 2021-04-09 ENCOUNTER — VIRTUAL VISIT (OUTPATIENT)
Dept: FAMILY MEDICINE | Facility: CLINIC | Age: 66
End: 2021-04-09
Payer: COMMERCIAL

## 2021-04-09 DIAGNOSIS — E78.5 HYPERLIPIDEMIA LDL GOAL <130: ICD-10-CM

## 2021-04-09 DIAGNOSIS — Z12.11 SPECIAL SCREENING FOR MALIGNANT NEOPLASMS, COLON: ICD-10-CM

## 2021-04-09 DIAGNOSIS — I10 HYPERTENSION GOAL BP (BLOOD PRESSURE) < 140/90: Primary | ICD-10-CM

## 2021-04-09 DIAGNOSIS — E66.01 MORBID OBESITY WITH BMI OF 40.0-44.9, ADULT (H): ICD-10-CM

## 2021-04-09 DIAGNOSIS — F32.5 MAJOR DEPRESSION IN REMISSION (H): ICD-10-CM

## 2021-04-09 PROCEDURE — 99214 OFFICE O/P EST MOD 30 MIN: CPT | Mod: TEL | Performed by: PREVENTIVE MEDICINE

## 2021-04-09 PROCEDURE — 96127 BRIEF EMOTIONAL/BEHAV ASSMT: CPT | Mod: 95 | Performed by: PREVENTIVE MEDICINE

## 2021-04-09 RX ORDER — VENLAFAXINE 25 MG/1
25 TABLET ORAL 2 TIMES DAILY
Qty: 180 TABLET | Refills: 1 | Status: SHIPPED | OUTPATIENT
Start: 2021-04-09 | End: 2021-07-16

## 2021-04-09 RX ORDER — HYDROCHLOROTHIAZIDE 25 MG/1
25 TABLET ORAL DAILY
Qty: 90 TABLET | Refills: 1 | Status: SHIPPED | OUTPATIENT
Start: 2021-04-09 | End: 2021-07-16

## 2021-04-09 RX ORDER — LISINOPRIL 10 MG/1
TABLET ORAL
Qty: 90 TABLET | Refills: 1 | Status: SHIPPED | OUTPATIENT
Start: 2021-04-09 | End: 2021-07-16

## 2021-04-09 RX ORDER — SIMVASTATIN 20 MG
TABLET ORAL
Qty: 90 TABLET | Refills: 1 | Status: SHIPPED | OUTPATIENT
Start: 2021-04-09 | End: 2021-07-16

## 2021-04-09 ASSESSMENT — PATIENT HEALTH QUESTIONNAIRE - PHQ9: SUM OF ALL RESPONSES TO PHQ QUESTIONS 1-9: 0

## 2021-04-09 NOTE — PROGRESS NOTES
Roxie is a 65 year old who is being evaluated via a billable telephone visit.      What phone number would you like to be contacted at? [phone]  How would you like to obtain your AVS? Mail a copy    Assessment & Plan     Hypertension goal BP (blood pressure) < 140/90  -blood pressure has not been checked in some time  -will check at her local pharmacy   -refills for 6 months provided, last time did not come in for labs.  - hydrochlorothiazide (HYDRODIURIL) 25 MG tablet  Dispense: 90 tablet; Refill: 1  - lisinopril (ZESTRIL) 10 MG tablet  Dispense: 90 tablet; Refill: 1  - CBC with platelets  - Basic metabolic panel  - Hemoglobin A1c  - Albumin Random Urine Quantitative with Creat Ratio  - Lipid panel reflex to direct LDL Fasting    Morbid obesity with BMI of 40.0-44.9, adult (H)  -Walking about 30-45 minutes a day   - CBC with platelets  - Basic metabolic panel  - Hemoglobin A1c    Major depression in remission (H)  -stable  -PHQ score 0   - venlafaxine (EFFEXOR) 25 MG tablet  Dispense: 180 tablet; Refill: 1    Hyperlipidemia LDL goal <130  - Lipid panel reflex to direct LDL Fasting  - ALT  - AST  -refills on Simvastatin provided    The ASCVD Risk score ([place] DC Jr., et al., 2013) failed to calculate for the following reasons:    The systolic blood pressure is missing    Special screening for malignant neoplasms, colon  - Fecal colorectal cancer screen (FIT)      25 minutes spent on the date of the encounter doing chart review, history and exam, documentation and further activities per the note           Return in about 1 day (around 4/10/2021) for labs.    Ana Arboleda MD MPH    [facility]   Roxie is a 65 year old who presents for the following health issues   HPI     Hyperlipidemia Follow-Up      Are you regularly taking any medication or supplement to lower your cholesterol?   Yes- statin    Are you having muscle aches or other side effects that you think could  be caused by your cholesterol lowering medication?  No     Couple of times a day will do some walking, 30 minutes daily     Hypertension Follow-up      Do you check your blood pressure regularly outside of the clinic? No     Are you following a low salt diet? Yes    Are your blood pressures ever more than 140 on the top number (systolic) OR more   than 90 on the bottom number (diastolic), for example 140/90? No   No chest pain  No headaches  No vision changes  No pedal edema  No dizziness     Depression Followup    How are you doing with your depression since your last visit? No change    Are you having other symptoms that might be associated with depression? No    Have you had a significant life event?  No     Are you feeling anxious or having panic attacks?   No    Do you have any concerns with your use of alcohol or other drugs? No    Social History     Tobacco Use     Smoking status: Former Smoker     Packs/day: 1.00     Years: 20.00     Pack years: 20.00     Types: Cigarettes     Smokeless tobacco: Never Used   Substance Use Topics     Alcohol use: Yes     Drug use: No     PHQ 4/23/2019 5/13/2020 4/9/2021   PHQ-9 Total Score 0 0 0   Q9: Thoughts of better off dead/self-harm past 2 weeks Not at all Not at all Not at all     MARIBEL-7 SCORE 4/24/2018   Total Score 0     Last PHQ-9 4/9/2021   1.  Little interest or pleasure in doing things 0   2.  Feeling down, depressed, or hopeless 0   3.  Trouble falling or staying asleep, or sleeping too much 0   4.  Feeling tired or having little energy 0   5.  Poor appetite or overeating 0   6.  Feeling bad about yourself 0   7.  Trouble concentrating 0   8.  Moving slowly or restless 0   Q9: Thoughts of better off dead/self-harm past 2 weeks 0   PHQ-9 Total Score 0   Difficulty at work, home, or with people Not difficult at all     MARIBEL-7  4/24/2018   1. Feeling nervous, anxious, or on edge 0   2. Not being able to stop or control worrying 0   3. Worrying too much about  different things 0   4. Trouble relaxing 0   5. Being so restless that it is hard to sit still 0   6. Becoming easily annoyed or irritable 0   7. Feeling afraid, as if something awful might happen 0   MARIBEL-7 Total Score 0   If you checked any problems, how difficult have they made it for you to do your work, take care of things at home, or get along with other people? Not difficult at all       Suicide Assessment Five-step Evaluation and Treatment (SAFE-T)      Had Covid first week of March. Now doing well. Some shortness of breath. Fatigue is OK. Felt like a bad sinus infection. No coughing.       Review of Systems   Constitutional, HEENT, cardiovascular, pulmonary, gi and gu systems are negative, except as otherwise noted.      Objective           Vitals:  No vitals were obtained today due to virtual visit.    Physical Exam   healthy, alert and no distress  PSYCH: Alert and oriented times 3; coherent speech, normal   rate and volume, able to articulate logical thoughts, able   to abstract reason, no tangential thoughts, no hallucinations   or delusions  Her affect is normal  RESP: No cough, no audible wheezing, able to talk in full sentences  Remainder of exam unable to be completed due to telephone visits            Phone call duration: 8 minutes

## 2021-06-23 DIAGNOSIS — Z12.11 SPECIAL SCREENING FOR MALIGNANT NEOPLASMS, COLON: ICD-10-CM

## 2021-06-23 DIAGNOSIS — I10 HYPERTENSION GOAL BP (BLOOD PRESSURE) < 140/90: ICD-10-CM

## 2021-06-23 DIAGNOSIS — E66.01 MORBID OBESITY WITH BMI OF 40.0-44.9, ADULT (H): ICD-10-CM

## 2021-06-23 DIAGNOSIS — E78.5 HYPERLIPIDEMIA LDL GOAL <130: ICD-10-CM

## 2021-06-23 PROBLEM — R73.03 PRE-DIABETES: Status: ACTIVE | Noted: 2021-06-23

## 2021-06-23 LAB
ALT SERPL W P-5'-P-CCNC: 43 U/L (ref 0–50)
ANION GAP SERPL CALCULATED.3IONS-SCNC: 2 MMOL/L (ref 3–14)
AST SERPL W P-5'-P-CCNC: 30 U/L (ref 0–45)
BUN SERPL-MCNC: 18 MG/DL (ref 7–30)
CALCIUM SERPL-MCNC: 8.9 MG/DL (ref 8.5–10.1)
CHLORIDE SERPL-SCNC: 102 MMOL/L (ref 94–109)
CHOLEST SERPL-MCNC: 185 MG/DL
CO2 SERPL-SCNC: 30 MMOL/L (ref 20–32)
CREAT SERPL-MCNC: 0.8 MG/DL (ref 0.52–1.04)
CREAT UR-MCNC: 38 MG/DL
ERYTHROCYTE [DISTWIDTH] IN BLOOD BY AUTOMATED COUNT: 13.4 % (ref 10–15)
GFR SERPL CREATININE-BSD FRML MDRD: 78 ML/MIN/{1.73_M2}
GLUCOSE SERPL-MCNC: 105 MG/DL (ref 70–99)
HBA1C MFR BLD: 5.7 % (ref 0–5.6)
HCT VFR BLD AUTO: 42.9 % (ref 35–47)
HDLC SERPL-MCNC: 61 MG/DL
HEMOCCULT STL QL IA: NEGATIVE
HGB BLD-MCNC: 13.6 G/DL (ref 11.7–15.7)
LDLC SERPL CALC-MCNC: 99 MG/DL
MCH RBC QN AUTO: 31.1 PG (ref 26.5–33)
MCHC RBC AUTO-ENTMCNC: 31.7 G/DL (ref 31.5–36.5)
MCV RBC AUTO: 98 FL (ref 78–100)
MICROALBUMIN UR-MCNC: <5 MG/L
MICROALBUMIN/CREAT UR: NORMAL MG/G CR (ref 0–25)
NONHDLC SERPL-MCNC: 124 MG/DL
PLATELET # BLD AUTO: 297 10E9/L (ref 150–450)
POTASSIUM SERPL-SCNC: 4.5 MMOL/L (ref 3.4–5.3)
RBC # BLD AUTO: 4.37 10E12/L (ref 3.8–5.2)
SODIUM SERPL-SCNC: 134 MMOL/L (ref 133–144)
TRIGL SERPL-MCNC: 125 MG/DL
WBC # BLD AUTO: 8.4 10E9/L (ref 4–11)

## 2021-06-23 PROCEDURE — 83036 HEMOGLOBIN GLYCOSYLATED A1C: CPT | Performed by: PREVENTIVE MEDICINE

## 2021-06-23 PROCEDURE — 84450 TRANSFERASE (AST) (SGOT): CPT | Performed by: PREVENTIVE MEDICINE

## 2021-06-23 PROCEDURE — 80048 BASIC METABOLIC PNL TOTAL CA: CPT | Performed by: PREVENTIVE MEDICINE

## 2021-06-23 PROCEDURE — 85027 COMPLETE CBC AUTOMATED: CPT | Performed by: PREVENTIVE MEDICINE

## 2021-06-23 PROCEDURE — 82274 ASSAY TEST FOR BLOOD FECAL: CPT | Performed by: PREVENTIVE MEDICINE

## 2021-06-23 PROCEDURE — 82043 UR ALBUMIN QUANTITATIVE: CPT | Performed by: PREVENTIVE MEDICINE

## 2021-06-23 PROCEDURE — 80061 LIPID PANEL: CPT | Performed by: PREVENTIVE MEDICINE

## 2021-06-23 PROCEDURE — 84460 ALANINE AMINO (ALT) (SGPT): CPT | Performed by: PREVENTIVE MEDICINE

## 2021-06-23 PROCEDURE — 36415 COLL VENOUS BLD VENIPUNCTURE: CPT | Performed by: PREVENTIVE MEDICINE

## 2021-06-23 NOTE — RESULT ENCOUNTER NOTE
Please send a letter:    Dear Roxie Woods    Here are your cholesterol results and they are at goal for you:    Your LDL is: Lab Results       Component                Value               Date                       LDL                      99                  06/23/2021          Your LDL goal is to be less than 130  Your HDL is: Lab Results       Component                Value               Date                       HDL                      61                  06/23/2021           Goal HDL is Greater than 40 (for men) or 50 (for women).  Your Triglycerides are: Lab Results       Component                Value               Date                       TRIG                     125                 06/23/2021          Goal TRIGLYCERIDES are less than 150.         Three month glucose number is in a pre diabetes range, this means that you do not have diabetes now but are at an increased risk of developing diabetes in the future.   Here are some ways to improve your glucose without medication:    Try to get at least 45 minutes of aerobic exercise 5-6 days a week  Maintain a healthy body weight  Eat less saturated fats  Buy lean cuts of meat, reduce your portions of red meat or substitute poultry or fish  Avoid fried or fast foods that are high in fat  Eat more fruits and vegetables  Limit carbohydrates      Urine sample is not showing any abnormal protein.  Liver function tests are normal.  Basic blood count is not showing anemia or infection.  Electrolytes and kidney function are normal.  Stool test did not show any blood, plan to check once a year as part of colon cancer screening.    Please let me know if you have any questions and thank you for choosing [facility].      Regards,    Ana Arboleda MD MPH

## 2021-06-23 NOTE — LETTER
June 24, 2021      Roxie Woods  [address] [place] [facility] [address]        Dear ,    We are writing to inform you of your test results.    Here are your cholesterol results and they are at goal for you:     Your LDL is: Lab Results        Component                Value               Date                        LDL                      99                  06/23/2021          Your LDL goal is to be less than 130   Your HDL is: Lab Results        Component                Value               Date                        HDL                      61                  06/23/2021           Goal HDL is Greater than 40 (for men) or 50 (for women).   Your Triglycerides are: Lab Results        Component                Value               Date                        TRIG                     125                 06/23/2021          Goal TRIGLYCERIDES are less than 150.         Three month glucose number is in a pre diabetes range, this means that you do not have diabetes now but are at an increased risk of developing diabetes in the future.   Here are some ways to improve your glucose without medication:     Try to get at least 45 minutes of aerobic exercise 5-6 days a week   Maintain a healthy body weight   Eat less saturated fats   Buy lean cuts of meat, reduce your portions of red meat or substitute poultry or fish   Avoid fried or fast foods that are high in fat   Eat more fruits and vegetables   Limit carbohydrates       Urine sample is not showing any abnormal protein.   Liver function tests are normal.       Basic blood count is not showing anemia or infection.   Electrolytes and kidney function are normal.   Stool test did not show any blood, plan to check once a year as part of colon cancer screening.     If you have any questions or concerns, please call the clinic at the number listed above.       Sincerely,      Ana Arboleda MD          Resulted Orders   AST   Result Value Ref Range    AST 30 0  - 45 U/L   ALT   Result Value Ref Range    ALT 43 0 - 50 U/L   Lipid panel reflex to direct LDL Fasting   Result Value Ref Range    Cholesterol 185 <200 mg/dL    Triglycerides 125 <150 mg/dL    HDL Cholesterol 61 >49 mg/dL    LDL Cholesterol Calculated 99 <100 mg/dL      Comment:      Desirable:       <100 mg/dl    Non HDL Cholesterol 124 <130 mg/dL   Albumin Random Urine Quantitative with Creat Ratio   Result Value Ref Range    Creatinine Urine 38 mg/dL    Albumin Urine mg/L <5 mg/L    Albumin Urine mg/g Cr Unable to calculate due to low value 0 - 25 mg/g Cr   Hemoglobin A1c   Result Value Ref Range    Hemoglobin A1C 5.7 (H) 0 - 5.6 %      Comment:      Normal <5.7% Prediabetes 5.7-6.4%  Diabetes 6.5% or higher - adopted from ADA   consensus guidelines.     Basic metabolic panel   Result Value Ref Range    Sodium 134 133 - 144 mmol/L    Potassium 4.5 3.4 - 5.3 mmol/L    Chloride 102 94 - 109 mmol/L    Carbon Dioxide 30 20 - 32 mmol/L    Anion Gap 2 (L) 3 - 14 mmol/L    Glucose 105 (H) 70 - 99 mg/dL    Urea Nitrogen 18 7 - 30 mg/dL    Creatinine 0.80 0.52 - 1.04 mg/dL    GFR Estimate 78 >60 mL/min/[1.73_m2]      Comment:      Non  GFR Calc  Starting 12/18/2018, serum creatinine based estimated GFR (eGFR) will be   calculated using the Chronic Kidney Disease Epidemiology Collaboration   (CKD-EPI) equation.      GFR Estimate If Black >90 >60 mL/min/[1.73_m2]      Comment:       GFR Calc  Starting 12/18/2018, serum creatinine based estimated GFR (eGFR) will be   calculated using the Chronic Kidney Disease Epidemiology Collaboration   (CKD-EPI) equation.      Calcium 8.9 8.5 - 10.1 mg/dL   CBC with platelets   Result Value Ref Range    WBC 8.4 4.0 - 11.0 10e9/L    RBC Count 4.37 3.8 - 5.2 10e12/L    Hemoglobin 13.6 11.7 - 15.7 g/dL    Hematocrit 42.9 35.0 - 47.0 %    MCV 98 78 - 100 fl    MCH 31.1 26.5 - 33.0 pg    MCHC 31.7 31.5 - 36.5 g/dL    RDW 13.4 10.0 - 15.0 %    Platelet Count 297  150 - 450 10e9/L   Fecal colorectal cancer screen (FIT)   Result Value Ref Range    Occult Blood Scn FIT Negative NEG^Negative

## 2021-06-24 ENCOUNTER — ANCILLARY PROCEDURE (OUTPATIENT)
Dept: MAMMOGRAPHY | Facility: OTHER | Age: 66
End: 2021-06-24
Attending: PREVENTIVE MEDICINE
Payer: COMMERCIAL

## 2021-06-24 DIAGNOSIS — Z12.31 VISIT FOR SCREENING MAMMOGRAM: ICD-10-CM

## 2021-06-24 PROCEDURE — 77067 SCR MAMMO BI INCL CAD: CPT | Mod: TC | Performed by: RADIOLOGY

## 2021-06-24 PROCEDURE — 77063 BREAST TOMOSYNTHESIS BI: CPT | Mod: TC | Performed by: RADIOLOGY

## 2021-06-30 ENCOUNTER — HOSPITAL ENCOUNTER (OUTPATIENT)
Dept: ULTRASOUND IMAGING | Facility: CLINIC | Age: 66
End: 2021-06-30
Attending: PREVENTIVE MEDICINE
Payer: COMMERCIAL

## 2021-06-30 ENCOUNTER — HOSPITAL ENCOUNTER (OUTPATIENT)
Dept: MAMMOGRAPHY | Facility: CLINIC | Age: 66
End: 2021-06-30
Attending: PREVENTIVE MEDICINE
Payer: COMMERCIAL

## 2021-06-30 ENCOUNTER — TRANSFERRED RECORDS (OUTPATIENT)
Dept: HEALTH INFORMATION MANAGEMENT | Facility: CLINIC | Age: 66
End: 2021-06-30

## 2021-06-30 DIAGNOSIS — R92.8 ABNORMAL MAMMOGRAM: ICD-10-CM

## 2021-06-30 PROCEDURE — 88377 M/PHMTRC ALYS ISHQUANT/SEMIQ: CPT | Mod: 26 | Performed by: MEDICAL GENETICS

## 2021-06-30 PROCEDURE — 88360 TUMOR IMMUNOHISTOCHEM/MANUAL: CPT | Mod: 26 | Performed by: PATHOLOGY

## 2021-06-30 PROCEDURE — 88360 TUMOR IMMUNOHISTOCHEM/MANUAL: CPT | Mod: TC | Performed by: PREVENTIVE MEDICINE

## 2021-06-30 PROCEDURE — 250N000009 HC RX 250: Performed by: PREVENTIVE MEDICINE

## 2021-06-30 PROCEDURE — 88341 IMHCHEM/IMCYTCHM EA ADD ANTB: CPT | Mod: 26 | Performed by: PATHOLOGY

## 2021-06-30 PROCEDURE — 88305 TISSUE EXAM BY PATHOLOGIST: CPT | Mod: TC | Performed by: PREVENTIVE MEDICINE

## 2021-06-30 PROCEDURE — 272N000431 US BREAST BIOPSY CORE NEEDLE RIGHT

## 2021-06-30 PROCEDURE — 999N001020 HC STATISTIC H-SEND OUTS PREP: Performed by: PREVENTIVE MEDICINE

## 2021-06-30 PROCEDURE — 88305 TISSUE EXAM BY PATHOLOGIST: CPT | Mod: 26 | Performed by: PATHOLOGY

## 2021-06-30 PROCEDURE — 999N001019 HC STATISTIC H-FISH PROCESS B/S: Performed by: PREVENTIVE MEDICINE

## 2021-06-30 PROCEDURE — 272N000491 US BREAST BIOPSY CORE NEEDLE RIGHT

## 2021-06-30 PROCEDURE — 88341 IMHCHEM/IMCYTCHM EA ADD ANTB: CPT | Mod: TC | Performed by: PREVENTIVE MEDICINE

## 2021-06-30 PROCEDURE — 88377 M/PHMTRC ALYS ISHQUANT/SEMIQ: CPT | Mod: TC | Performed by: PATHOLOGY

## 2021-06-30 PROCEDURE — 76642 ULTRASOUND BREAST LIMITED: CPT | Mod: RT

## 2021-06-30 PROCEDURE — 999N000065 MA POST PROCEDURE RIGHT

## 2021-06-30 PROCEDURE — 88342 IMHCHEM/IMCYTCHM 1ST ANTB: CPT | Mod: 26 | Performed by: PATHOLOGY

## 2021-06-30 PROCEDURE — 88342 IMHCHEM/IMCYTCHM 1ST ANTB: CPT | Mod: TC | Performed by: PREVENTIVE MEDICINE

## 2021-06-30 RX ORDER — LIDOCAINE HYDROCHLORIDE 10 MG/ML
5 INJECTION, SOLUTION EPIDURAL; INFILTRATION; INTRACAUDAL; PERINEURAL ONCE
Status: COMPLETED | OUTPATIENT
Start: 2021-06-30 | End: 2021-06-30

## 2021-06-30 RX ORDER — LIDOCAINE HYDROCHLORIDE AND EPINEPHRINE 10; 10 MG/ML; UG/ML
1 INJECTION, SOLUTION INFILTRATION; PERINEURAL ONCE
Status: COMPLETED | OUTPATIENT
Start: 2021-06-30 | End: 2021-06-30

## 2021-06-30 RX ADMIN — LIDOCAINE HYDROCHLORIDE 2 ML: 10 INJECTION, SOLUTION EPIDURAL; INFILTRATION; INTRACAUDAL; PERINEURAL at 15:08

## 2021-06-30 RX ADMIN — LIDOCAINE HYDROCHLORIDE AND EPINEPHRINE 7 ML: 10; 10 INJECTION, SOLUTION INFILTRATION; PERINEURAL at 15:09

## 2021-07-06 DIAGNOSIS — C50.211 MALIGNANT NEOPLASM OF UPPER-INNER QUADRANT OF RIGHT BREAST IN FEMALE, ESTROGEN RECEPTOR POSITIVE (H): Primary | ICD-10-CM

## 2021-07-06 DIAGNOSIS — Z17.0 MALIGNANT NEOPLASM OF UPPER-INNER QUADRANT OF RIGHT BREAST IN FEMALE, ESTROGEN RECEPTOR POSITIVE (H): Primary | ICD-10-CM

## 2021-07-06 LAB — COPATH REPORT: NORMAL

## 2021-07-06 NOTE — PROGRESS NOTES
Malignant Path:  Pathology report reviewed with our breast radiologist Dr. Bharathi Alonzo, who confirmed the recent breast imaging is concordant with the final surgical pathology results below.    I phoned Ms. Anais White, confirmed her full name, date of birth, and notified patient of Ultrasound Guided Right Breast Biopsy results showing Invasive Ductal Carcinoma.  Estrogen/ Progesterone Receptors are (+) positive, and HER2 is still pending.  Informed patient we will call her once results are available.    Patient states no problems with biopsy site.  Recommended follow up is Surgical/Oncology consultations.   I will place order for referral to Surgical Oncology and Cancer Care Team  will be reaching out to patient within the next 24 - 48 hours to assist in getting patient scheduled.  Patient has schedulers phone numbers.    Questions were answered and she has my phone number if she has further questions.  Patient verbalized understanding and agrees with the plan of care.  Ordering provider- Dr. Ana June has been notified of the results, recommendations for follow up, and scheduled surgical consultation.  I will forward this note, along with the pathology results.   Rosy Shi RN, BSN    Rosy Shi RN, BSN  Breast Care Nurse Coordinator  [facility] [place]  [phone]      Patient Name: ANAIS WHITE   MR#: [id number]   Specimen #: [id number]   Collected: 6/30/2021   Received: 7/1/2021   Reported: 7/6/2021 15:47   Ordering Phy(s): ANA JUNE     SPECIMEN(S):   Breast needle biopsy, right, 1:00, 6cm FN     FINAL DIAGNOSIS:   Breast, right, 1:00, 6 cm from nipple, needle core biopsy:   - Invasive ductal carcinoma, Vielka grade 2 (of 3).   - Ductal carcinoma in situ (DCIS), nuclear grade 2 (of 3), solid pattern.   - Estrogen receptor (ER) positive by immunohistochemistry.   - Progesterone receptor (MO) positive by  immunohistochemistry.   - HER2 studies are pending and will be reported separately by the   performing laboratory.     Electronically signed out by:     Declan Tripp M.D.

## 2021-07-09 NOTE — TELEPHONE ENCOUNTER
RECORDS STATUS - BREAST    RECORDS REQUESTED FROM: EPIC   DATE REQUESTED: 9/3/2021   NOTES DETAILS STATUS   OFFICE NOTE from referring provider     OFFICE NOTE from medical oncologist N/A    OFFICE NOTE from surgeon Complete See Breast Biopsy in EPIC   OFFICE NOTE from radiation oncologist     DISCHARGE SUMMARY from hospital N/A    DISCHARGE REPORT from the ER     OPERATIVE REPORT Complete See Breast Biopsy in EPIC   MEDICATION LIST Complete [facility]   CLINICAL TRIAL TREATMENTS TO DATE     LABS     PATHOLOGY REPORTS  (Tissue diagnosis, Stage, ER/VA percentage positive and intensity of staining, HER2 IHC, FISH, and all biopsies from breast and any distant metastasis)                 Complete-internal breast biopsy  6/30/2021   Breast, right, 1:00, 6 cm from nipple, needle core biopsy:   - Invasive ductal carcinoma, Weiner grade 2 (of 3).   - Ductal carcinoma in situ (DCIS), nuclear grade 2 (of 3), solid pattern.   - Estrogen receptor (ER) positive by immunohistochemistry.   - Progesterone receptor (VA) positive by immunohistochemistry.   - HER2 studies are pending and will be reported separately by the   performing laboratory.    GENONOMIC TESTING     TYPE:   (Next Generation Sequencing, including Foundation One testing, and Oncotype score) Complete Her 2 [place] [place] 7/6/2021   IMAGING (NEED IMAGES & REPORT)     CT SCANS     MRI     MAMMO Complete MA Post Procedure 6/30/2021    MA Screening Bilateral 6/24/2021    MA Screening Bilateral 5/28/2020, 5/2/2019   More in PACS   ULTRASOUND Complete US Breast biopsy 6/30/2021     US Breast Right 6/30/2021   PET     BONE SCAN     BRAIN MRI

## 2021-07-13 ENCOUNTER — OFFICE VISIT (OUTPATIENT)
Dept: SURGERY | Facility: CLINIC | Age: 66
End: 2021-07-13
Attending: PREVENTIVE MEDICINE
Payer: COMMERCIAL

## 2021-07-13 ENCOUNTER — TELEPHONE (OUTPATIENT)
Dept: SURGERY | Facility: CLINIC | Age: 66
End: 2021-07-13

## 2021-07-13 VITALS
WEIGHT: 236.8 LBS | BODY MASS INDEX: 41.96 KG/M2 | DIASTOLIC BLOOD PRESSURE: 74 MMHG | HEIGHT: 63 IN | TEMPERATURE: 98 F | SYSTOLIC BLOOD PRESSURE: 132 MMHG

## 2021-07-13 DIAGNOSIS — D68.51 FACTOR 5 LEIDEN MUTATION, HETEROZYGOUS (H): ICD-10-CM

## 2021-07-13 DIAGNOSIS — Z11.59 ENCOUNTER FOR SCREENING FOR OTHER VIRAL DISEASES: ICD-10-CM

## 2021-07-13 DIAGNOSIS — Z87.891 PERSONAL HISTORY OF TOBACCO USE, PRESENTING HAZARDS TO HEALTH: ICD-10-CM

## 2021-07-13 DIAGNOSIS — C50.211 MALIGNANT NEOPLASM OF UPPER-INNER QUADRANT OF RIGHT BREAST IN FEMALE, ESTROGEN RECEPTOR POSITIVE (H): Primary | ICD-10-CM

## 2021-07-13 DIAGNOSIS — E78.00 HYPERCHOLESTEROLEMIA: ICD-10-CM

## 2021-07-13 DIAGNOSIS — R73.01 ELEVATED FASTING BLOOD SUGAR: ICD-10-CM

## 2021-07-13 DIAGNOSIS — E78.5 HYPERLIPIDEMIA LDL GOAL <130: ICD-10-CM

## 2021-07-13 DIAGNOSIS — Z17.0 MALIGNANT NEOPLASM OF UPPER-INNER QUADRANT OF RIGHT BREAST IN FEMALE, ESTROGEN RECEPTOR POSITIVE (H): Primary | ICD-10-CM

## 2021-07-13 DIAGNOSIS — E66.01 MORBID OBESITY WITH BMI OF 40.0-44.9, ADULT (H): ICD-10-CM

## 2021-07-13 DIAGNOSIS — Z83.2 FAMILY HISTORY OF FACTOR V LEIDEN MUTATION: ICD-10-CM

## 2021-07-13 DIAGNOSIS — Z80.3 FAMILY HISTORY OF BREAST CANCER IN MOTHER: ICD-10-CM

## 2021-07-13 PROCEDURE — 99204 OFFICE O/P NEW MOD 45 MIN: CPT | Performed by: SURGERY

## 2021-07-13 ASSESSMENT — MIFFLIN-ST. JEOR: SCORE: 1580.31

## 2021-07-13 NOTE — PROGRESS NOTES
"    Assessment & Plan   Problem List Items Addressed This Visit        Digestive    Morbid obesity with BMI of 40.0-44.9, adult (H)       Endocrine    Hypercholesterolemia    Elevated fasting blood sugar    Hyperlipidemia LDL goal <130       Hematologic    Factor 5 Leiden mutation, heterozygous (H)       Behavioral    Personal history of tobacco use, presenting hazards to health       Other    Family history of factor V Leiden mutation    Malignant neoplasm of upper-inner quadrant of right breast in female, estrogen receptor positive (H) - Primary    Relevant Orders    Case Request: Right wire localized partial mastectomy with right sentinel lymph node biopsy with possible axillary dissection      Other Visit Diagnoses     Family history of breast cancer in mother               I reviewed the imaging, diagnosis, staging, and management (including surgery, chemotherapy, radiation therapy, and endocrine therapy) of invasive ductal carcinoma with Roxie Woods. A copy of the biopsy pathology report was also provided.     I explained the surgical options: breast conservative therapy vs simple mastectomy with sentinel lymph node biopsy.    The treatment for resectable breast cancer is surgical resection, in the form of either breast conservation (segmental mastectomy plus radiation) or mastectomy.  We reviewed that the two strategies are equivalent in terms of overall survival.  The advantages and disadvantages of each were discussed.       Roxie Woods IS a candidate for breast conservation therapy.  We discussed that this involves two necessary components: the lumpectomy (or \"segmental mastectomy\"), and several weeks of whole breast radiation therapy.  We discussed that the overall survival after breast conservation therapy is identical to mastectomy and that local recurrence rates are significantly higher if segmental mastectomy was performed without subsequent radiation.  We also discussed the " significance of clear margins and that a subsequent procedure may be necessary to achieve this.  Because the lesion is not palpable, a wire-localized approach would be taken for breast conservation.  She would present on the day of surgery for an image-guided wire placement, followed by a surgical excision in the operating room.  The risks of a wire-localized segmental mastectomy were discussed with the patient and family, including the risks of bleeding, wound infection, wound dehiscence, and post-operative contour change to the breast.      Alternatively, we also discussed the various types of mastectomy, including total, skin-sparing, and nipple-sparing mastectomy.  We reviewed that the nipple-sparing technique is cosmetic; sensation and contractility will likely be lost.  Roxie Woods is a candidate for simple mastectomy with reconstruction.  The risks of a mastectomy were discussed with the patient and family, including the risks of bleeding, wound infection, wound dehiscence, skin flap/nipple necrosis, poor cosmetic outcomes with skin folds, decreased shoulder range of motion, chest wall numbness, etc. Roxie Woods was not interested in this.  Depending on the margin and edenilson status post-mastectomy, radiation may be necessary.     I advised Ms. Woods that lymph node biopsy is recommended whenever we are dealing with invasive breast cancer and described the procedure for sentinel lymph node biopsy.  We talked about the risk for lymphedema (7%) which is small with removal of only a few nodes.    We talked about level 1 and level 2 axillary lymph node dissection; risk for lymphedema is up to 20-30%%.  We talked about the indications for the axillary dissection.  I reviewed the data regarding lumpectomy and radiation vs mastectomy that shows that the local recurrence risk is slightly higher for lumpectomy and radiation vs mastectomy (5-10% vs. 1-2%), but the survival at 20 years is the same.  "    There is a 1-2% chance of patients whose sentinel lymph nodes do not map despite dual tracer (radiocolloid and lymphazurin). Should this be the case, we discussed that I would proceed with an axillary lymph node dissection at the index procedure.  The higher risks of an axillary lymph node dissection were also reviewed, including lymphedema (20-30%), bleeding, wound infection, wound dehiscence, seroma formation, nerve injury, limited arm range of motion and paresthesias. We discussed that a drain would be placed intra-operatively should an axillary lymph node dissection be performed.      Pt is receptor status is ER/MI (+); but pending HER2; thus I recommend we schedule her surgery and use that surgery time for a power port placement if her HER (+) as she will likely need neoadjuvant chemotherapy prior to undergoing any surgical intervention.       Patient has significant family history mother who was diagnosed breast cancer in her 70s..  Pt does meet NCCN criteria for genetic testing - she will think about this.       Finally, we reviewed that breast cancer care is a team-approach, medical oncology and radiation oncology referrals will also be made after surgery to discuss adjuvant systemic/endocrine therapy and radiation therapy.  The decision regarding adjuvant chemotherapy will be made after surgery.     As of right now, Ms. Woods is leaning more towards Right breast wire localized partial mastectomy with sentinel lymph node biopsy possible right axillary dissection.   I drew out the procedure for above procedure in addition to the possible risks as discussed above.          BMI:   Estimated body mass index is 42.62 kg/m  as calculated from the following:    Height as of this encounter: 1.588 m (5' 2.5\").    Weight as of this encounter: 107.4 kg (236 lb 12.8 oz).   Weight management plan: Patient was referred to their PCP to discuss a diet and exercise plan.        No follow-ups on file.    [facility] Allan, " "MD SUERO [facility]    Liliana Faustin is a 66 year old who presents for the following health issues  accompanied by her spouse:    Cancer found on screening mammogram.  Never had abnl one in the past.  No palpable; no changes in skin, no changes to nipple (inversion, discharge, etc).  Past smoker - quit 1 year ago in march.  Hx of Factor 5 leiden - never MI, CVA, DVTs; currently not on any blood thinner.       BREAST-SPECIFIC HISTORY:  Prior breast biopsies: no  Prior breast surgeries: no  Prior radiation history: no  Hormone replacement therapy: no  Bra size: 42C  Dominant hand: right     GYN HISTORY:  [id number]  Age at 1st pregnancy: 22  Age at menarche: 13  Breastfeeding history: no  Menopausal? Post  - 50s     Reproductive PSH includes: none    Cancer history in self: none      FAMILY HISTORY:  Breast ca: mom, 75  Ovarian ca: no  Pancreatic ca: no  Gastric ca: no   Melanoma: no  Colon ca: father, 70.    Other cancer: none         Review of Systems   Constitutional, HEENT, cardiovascular, pulmonary, GI, , musculoskeletal, neuro, skin, endocrine and psych systems are negative, except as otherwise noted.      Objective    /74   Temp 98  F (36.7  C) (Temporal)   Ht 1.588 m (5' 2.5\")   Wt 107.4 kg (236 lb 12.8 oz)   BMI 42.62 kg/m    Body mass index is 42.62 kg/m .  Physical Exam  Vitals reviewed.   Eyes:      Conjunctiva/sclera: Conjunctivae normal.   Cardiovascular:      Pulses: Normal pulses.   Chest:      Breasts:         Right: Normal. No inverted nipple, mass, nipple discharge, skin change or tenderness.         Left: Normal.   Abdominal:      Palpations: Abdomen is soft.   Musculoskeletal:         General: Normal range of motion.      Cervical back: Normal range of motion and neck supple.   Lymphadenopathy:      Upper Body:      Right upper body: No supraclavicular or axillary adenopathy.      Left upper body: No supraclavicular or axillary adenopathy.   Skin:     General: " Skin is warm.      Capillary Refill: Capillary refill takes less than 2 seconds.   Neurological:      General: No focal deficit present.      Mental Status: She is alert.   Psychiatric:         Mood and Affect: Mood normal.        SPECIMEN(S):   Breast needle biopsy, right, 1:00, 6cm FN     FINAL DIAGNOSIS:   Breast, right, 1:00, 6 cm from nipple, needle core biopsy:   - Invasive ductal carcinoma, Peyton grade 2 (of 3).   - Ductal carcinoma in situ (DCIS), nuclear grade 2 (of 3), solid pattern.   - Estrogen receptor (ER) positive by immunohistochemistry.   - Progesterone receptor (CT) positive by immunohistochemistry.   - HER2 studies are pending and will be reported separately by the   performing laboratory.     US BREAST RIGHT LIMITED 1-3 QUADRANTS 6/30/2021 2:25 PM     HISTORY:  Developing asymmetry right breast on screening mammogram  dated 6/24/2021. Ultrasound recommended for further evaluation.     COMPARISON: Mammograms dated 6/24/2021, 5/28/2020, 5/2/2019 and  8/9/2016.     TECHNIQUE:  Targeted ultrasound of the inner right breast.     COMMENTS: Hypoechoic taller than wide shadowing irregular lesion in  the right breast 1:00 position 6 cm from nipple measures 1.2 x 1.1 x  1.2 cm and corresponds with the mammographic findings. This is a  concerning finding. Further evaluation with biopsy is recommended                                                                      IMPRESSION: BI-RADS CATEGORY: 5 - Highly Suggestive of  Malignancy-Appropriate Action Should Be Take. Solid heterogeneously  hypoechoic irregular shadowing lesion in the right breast  corresponding with the mammographic findings is concerning. Biopsy  recommended.     RECOMMENDED FOLLOW-UP: Biopsy     I discussed the findings and recommendations with the patient at the  time of the exam.     STEFANIA VÁZQUEZ MD     Examination: Bilateral digital screening mammography with computer  aided detection including digital breast tomosynthesis,  6/24/2021 1:01  PM.     Comparison: 05/28/2020, 05/02/2019, 08/09/2016, 09/09/2013, 09/14/2011     History: No current breast concerns. Mother with breast cancer.     BREAST DENSITY: Scattered fibroglandular densities.     COMMENTS: Possible developing asymmetry with architectural distortion  in the RIGHT breast at approximately 12:00 5 to 6 cm from the nipple.  No concerning findings in the left breast.                                                                       IMPRESSION: BI-RADS CATEGORY: 0 - Need Additional Imaging Evaluation  and/or Prior Mammograms for Comparison.     RECOMMENDED FOLLOW-UP: Ultrasound.     Additional imaging evaluation of the RIGHT breast with focused  ultrasound.     The patient will be notified of the results.      ELIF HWANG MD

## 2021-07-13 NOTE — LETTER
"    7/13/2021         RE: Roxie Woods  [id number] [facility] [id number]        Dear Colleague,    Thank you for referring your patient, Roxie Woods, to the [facility]. Please see a copy of my visit note below.        Assessment & Plan   Problem List Items Addressed This Visit        Digestive    Morbid obesity with BMI of 40.0-44.9, adult (H)       Endocrine    Hypercholesterolemia    Elevated fasting blood sugar    Hyperlipidemia LDL goal <130       Hematologic    Factor 5 Leiden mutation, heterozygous (H)       Behavioral    Personal history of tobacco use, presenting hazards to health       Other    Family history of factor V Leiden mutation    Malignant neoplasm of upper-inner quadrant of right breast in female, estrogen receptor positive (H) - Primary    Relevant Orders    Case Request: Right wire localized partial mastectomy with right sentinel lymph node biopsy with possible axillary dissection      Other Visit Diagnoses     Family history of breast cancer in mother               I reviewed the imaging, diagnosis, staging, and management (including surgery, chemotherapy, radiation therapy, and endocrine therapy) of invasive ductal carcinoma with Roxie Woods. A copy of the biopsy pathology report was also provided.     I explained the surgical options: breast conservative therapy vs simple mastectomy with sentinel lymph node biopsy.    The treatment for resectable breast cancer is surgical resection, in the form of either breast conservation (segmental mastectomy plus radiation) or mastectomy.  We reviewed that the two strategies are equivalent in terms of overall survival.  The advantages and disadvantages of each were discussed.       Roxie Woods IS a candidate for breast conservation therapy.  We discussed that this involves two necessary components: the lumpectomy (or \"segmental mastectomy\"), and several weeks of whole breast radiation " therapy.  We discussed that the overall survival after breast conservation therapy is identical to mastectomy and that local recurrence rates are significantly higher if segmental mastectomy was performed without subsequent radiation.  We also discussed the significance of clear margins and that a subsequent procedure may be necessary to achieve this.  Because the lesion is not palpable, a wire-localized approach would be taken for breast conservation.  She would present on the day of surgery for an image-guided wire placement, followed by a surgical excision in the operating room.  The risks of a wire-localized segmental mastectomy were discussed with the patient and family, including the risks of bleeding, wound infection, wound dehiscence, and post-operative contour change to the breast.      Alternatively, we also discussed the various types of mastectomy, including total, skin-sparing, and nipple-sparing mastectomy.  We reviewed that the nipple-sparing technique is cosmetic; sensation and contractility will likely be lost.  Roxie Woods is a candidate for simple mastectomy with reconstruction.  The risks of a mastectomy were discussed with the patient and family, including the risks of bleeding, wound infection, wound dehiscence, skin flap/nipple necrosis, poor cosmetic outcomes with skin folds, decreased shoulder range of motion, chest wall numbness, etc. Roxie Woods was not interested in this.  Depending on the margin and edenilson status post-mastectomy, radiation may be necessary.     I advised Ms. Woods that lymph node biopsy is recommended whenever we are dealing with invasive breast cancer and described the procedure for sentinel lymph node biopsy.  We talked about the risk for lymphedema (7%) which is small with removal of only a few nodes.    We talked about level 1 and level 2 axillary lymph node dissection; risk for lymphedema is up to 20-30%%.  We talked about the indications for the  axillary dissection.  I reviewed the data regarding lumpectomy and radiation vs mastectomy that shows that the local recurrence risk is slightly higher for lumpectomy and radiation vs mastectomy (5-10% vs. 1-2%), but the survival at 20 years is the same.     There is a 1-2% chance of patients whose sentinel lymph nodes do not map despite dual tracer (radiocolloid and lymphazurin). Should this be the case, we discussed that I would proceed with an axillary lymph node dissection at the index procedure.  The higher risks of an axillary lymph node dissection were also reviewed, including lymphedema (20-30%), bleeding, wound infection, wound dehiscence, seroma formation, nerve injury, limited arm range of motion and paresthesias. We discussed that a drain would be placed intra-operatively should an axillary lymph node dissection be performed.      Pt is receptor status is ER/WA (+); but pending HER2; thus I recommend we schedule her surgery and use that surgery time for a power port placement if her HER (+) as she will likely need neoadjuvant chemotherapy prior to undergoing any surgical intervention.       Patient has significant family history mother who was diagnosed breast cancer in her 70s..  Pt does meet NCCN criteria for genetic testing - she will think about this.       Finally, we reviewed that breast cancer care is a team-approach, medical oncology and radiation oncology referrals will also be made after surgery to discuss adjuvant systemic/endocrine therapy and radiation therapy.  The decision regarding adjuvant chemotherapy will be made after surgery.     As of right now, Ms. Woods is leaning more towards Right breast wire localized partial mastectomy with sentinel lymph node biopsy possible right axillary dissection.   I drew out the procedure for above procedure in addition to the possible risks as discussed above.          BMI:   Estimated body mass index is 42.62 kg/m  as calculated from the  "following:    Height as of this encounter: 1.588 m (5' 2.5\").    Weight as of this encounter: 107.4 kg (236 lb 12.8 oz).   Weight management plan: Patient was referred to their PCP to discuss a diet and exercise plan.        No follow-ups on file.    Agustín Allan MD  [facility]    Liliana Faustin is a 66 year old who presents for the following health issues  accompanied by her spouse:    Cancer found on screening mammogram.  Never had abnl one in the past.  No palpable; no changes in skin, no changes to nipple (inversion, discharge, etc).  Past smoker - quit 1 year ago in march.  Hx of Factor 5 leiden - never MI, CVA, DVTs; currently not on any blood thinner.       BREAST-SPECIFIC HISTORY:  Prior breast biopsies: no  Prior breast surgeries: no  Prior radiation history: no  Hormone replacement therapy: no  Bra size: 42C  Dominant hand: right     GYN HISTORY:  [id number]  Age at 1st pregnancy: 22  Age at menarche: 13  Breastfeeding history: no  Menopausal? Post  - 50s     Reproductive PSH includes: none    Cancer history in self: none      FAMILY HISTORY:  Breast ca: mom, 75  Ovarian ca: no  Pancreatic ca: no  Gastric ca: no   Melanoma: no  Colon ca: father, 70.    Other cancer: none         Review of Systems   Constitutional, HEENT, cardiovascular, pulmonary, GI, , musculoskeletal, neuro, skin, endocrine and psych systems are negative, except as otherwise noted.      Objective    /74   Temp 98  F (36.7  C) (Temporal)   Ht 1.588 m (5' 2.5\")   Wt 107.4 kg (236 lb 12.8 oz)   BMI 42.62 kg/m    Body mass index is 42.62 kg/m .  Physical Exam  Vitals reviewed.   Eyes:      Conjunctiva/sclera: Conjunctivae normal.   Cardiovascular:      Pulses: Normal pulses.   Chest:      Breasts:         Right: Normal. No inverted nipple, mass, nipple discharge, skin change or tenderness.         Left: Normal.   Abdominal:      Palpations: Abdomen is soft.   Musculoskeletal:         General: Normal " range of motion.      Cervical back: Normal range of motion and neck supple.   Lymphadenopathy:      Upper Body:      Right upper body: No supraclavicular or axillary adenopathy.      Left upper body: No supraclavicular or axillary adenopathy.   Skin:     General: Skin is warm.      Capillary Refill: Capillary refill takes less than 2 seconds.   Neurological:      General: No focal deficit present.      Mental Status: She is alert.   Psychiatric:         Mood and Affect: Mood normal.        SPECIMEN(S):   Breast needle biopsy, right, 1:00, 6cm FN     FINAL DIAGNOSIS:   Breast, right, 1:00, 6 cm from nipple, needle core biopsy:   - Invasive ductal carcinoma, Vielka grade 2 (of 3).   - Ductal carcinoma in situ (DCIS), nuclear grade 2 (of 3), solid pattern.   - Estrogen receptor (ER) positive by immunohistochemistry.   - Progesterone receptor (TN) positive by immunohistochemistry.   - HER2 studies are pending and will be reported separately by the   performing laboratory.     US BREAST RIGHT LIMITED 1-3 QUADRANTS 6/30/2021 2:25 PM     HISTORY:  Developing asymmetry right breast on screening mammogram  dated 6/24/2021. Ultrasound recommended for further evaluation.     COMPARISON: Mammograms dated 6/24/2021, 5/28/2020, 5/2/2019 and  8/9/2016.     TECHNIQUE:  Targeted ultrasound of the inner right breast.     COMMENTS: Hypoechoic taller than wide shadowing irregular lesion in  the right breast 1:00 position 6 cm from nipple measures 1.2 x 1.1 x  1.2 cm and corresponds with the mammographic findings. This is a  concerning finding. Further evaluation with biopsy is recommended                                                                      IMPRESSION: BI-RADS CATEGORY: 5 - Highly Suggestive of  Malignancy-Appropriate Action Should Be Take. Solid heterogeneously  hypoechoic irregular shadowing lesion in the right breast  corresponding with the mammographic findings is concerning.  Biopsy  recommended.     RECOMMENDED FOLLOW-UP: Biopsy     I discussed the findings and recommendations with the patient at the  time of the exam.     STEFANIA VÁZQUEZ MD     Examination: Bilateral digital screening mammography with computer  aided detection including digital breast tomosynthesis, 6/24/2021 1:01  PM.     Comparison: 05/28/2020, 05/02/2019, 08/09/2016, 09/09/2013, 09/14/2011     History: No current breast concerns. Mother with breast cancer.     BREAST DENSITY: Scattered fibroglandular densities.     COMMENTS: Possible developing asymmetry with architectural distortion  in the RIGHT breast at approximately 12:00 5 to 6 cm from the nipple.  No concerning findings in the left breast.                                                                       IMPRESSION: BI-RADS CATEGORY: 0 - Need Additional Imaging Evaluation  and/or Prior Mammograms for Comparison.     RECOMMENDED FOLLOW-UP: Ultrasound.     Additional imaging evaluation of the RIGHT breast with focused  ultrasound.     The patient will be notified of the results.      ELIF HWANG MD            Again, thank you for allowing me to participate in the care of your patient.        Sincerely,        Agustín Allan MD

## 2021-07-13 NOTE — TELEPHONE ENCOUNTER
Type of surgery: Right wire localized partial mastectomy with right sentinel lymph node biopsy with possible axillary dissection (Right)   Location of surgery: [facility] OR  Date and time of surgery: 7/22  Surgeon: Sanjana  Pre-Op Appt Date: 7/19  Post-Op Appt Date: 7/28   Packet sent out: Yes  Pre-cert/Authorization completed:  Not Applicable  Date: na

## 2021-07-16 ENCOUNTER — TELEPHONE (OUTPATIENT)
Dept: FAMILY MEDICINE | Facility: CLINIC | Age: 66
End: 2021-07-16

## 2021-07-16 DIAGNOSIS — E78.5 HYPERLIPIDEMIA LDL GOAL <130: ICD-10-CM

## 2021-07-16 DIAGNOSIS — I10 HYPERTENSION GOAL BP (BLOOD PRESSURE) < 140/90: ICD-10-CM

## 2021-07-16 DIAGNOSIS — F32.5 MAJOR DEPRESSION IN REMISSION (H): ICD-10-CM

## 2021-07-16 RX ORDER — HYDROCHLOROTHIAZIDE 25 MG/1
25 TABLET ORAL DAILY
Qty: 90 TABLET | Refills: 1 | Status: SHIPPED | OUTPATIENT
Start: 2021-07-16 | End: 2022-04-19

## 2021-07-16 RX ORDER — LISINOPRIL 10 MG/1
TABLET ORAL
Qty: 90 TABLET | Refills: 1 | Status: SHIPPED | OUTPATIENT
Start: 2021-07-16 | End: 2022-04-19

## 2021-07-16 RX ORDER — SIMVASTATIN 20 MG
TABLET ORAL
Qty: 90 TABLET | Refills: 1 | Status: SHIPPED | OUTPATIENT
Start: 2021-07-16 | End: 2022-06-10

## 2021-07-16 RX ORDER — VENLAFAXINE 25 MG/1
25 TABLET ORAL 2 TIMES DAILY
Qty: 180 TABLET | Refills: 1 | Status: SHIPPED | OUTPATIENT
Start: 2021-07-16 | End: 2021-12-30

## 2021-07-16 NOTE — TELEPHONE ENCOUNTER
Patient would like to know why there are no refills left at pharmacy after this one. States she was seen in Feb/March and usually gets a years worth of refills. Lolita RASHEED -

## 2021-07-16 NOTE — H&P (VIEW-ONLY)
Answers for HPI/ROS submitted by the patient on 7/19/2021  If you checked off any problems, how difficult have these problems made it for you to do your work, take care of things at home, or get along with other people?: Not difficult at all  PHQ9 TOTAL SCORE: 0      [facility]  [address]  [place] [address]  Phone: [phone]  Primary Provider: Marlen Aguilar  Pre-op Performing Provider: ROBERT CORRALES      PREOPERATIVE EVALUATION:  Today's date: 7/19/2021    Roxie Woods is a 66 year old female who presents for a preoperative evaluation.    Surgical Information:  Surgery/Procedure: Right wire localized partial mastectomy with right sentinel lymph node biopsy with possible axillary dissection  Surgery Location: [facility]   Surgeon: Allan  Surgery Date: 07/22  Time of Surgery: 9AM  Where patient plans to recover: At home with family  Fax number for surgical facility: Note does not need to be faxed, will be available electronically in Epic.    Type of Anesthesia Anticipated: Choice    Assessment & Plan     The proposed surgical procedure is considered LOW risk.    Preop general physical exam  Malignant neoplasm of upper-inner quadrant of right breast in female, estrogen receptor positive (H)  Hypertension goal BP (blood pressure) < 140/90  Pre-diabetes  Factor 5 Leiden mutation, heterozygous (H)  Personal history of tobacco use, presenting hazards to health  Snoring  Morbid obesity with BMI of 40.0-44.9, adult (H)  Cleared for surgical intervention at this point in time.  Recent labs are reassuring.  Anesthesia staff should be made aware that there is a potential for sleep apnea though her overall screening questions are only positive for snoring.    Need for vaccination  Screening for HIV (human immunodeficiency virus)  Declines vaccination and screening today.           Risks and Recommendations:  The patient has the following additional risks and  recommendations for perioperative complications:   - No identified additional risk factors other than previously addressed    Medication Instructions:   - ACE/ARB: HOLD due to risk of hypotension during surgery.     RECOMMENDATION:  APPROVAL GIVEN to proceed with proposed procedure, without further diagnostic evaluation.    I did review the surgeon's notes as they are available to us.  We note that the note is not complete yet at this point in time.  Surgical intervention with respect to the right breast is planned.    Subjective     HPI related to upcoming procedure: Right sided breast cancer in need of further evaluation and treatment.      Health Care Directive:  Patient does not have a Health Care Directive or Living Will: Discussed advance care planning with patient; however, patient declined at this time.    Preoperative Review of :   reviewed - no record of controlled substances prescribed.      Status of Chronic Conditions:  See problem list for active medical problems.  Problems all longstanding and stable, except as noted/documented.  See ROS for pertinent symptoms related to these conditions.      Review of Systems  CONSTITUTIONAL: NEGATIVE for fever, chills, change in weight  INTEGUMENTARY/SKIN: NEGATIVE for worrisome rashes, moles or lesions  EYES: NEGATIVE for vision changes or irritation  ENT/MOUTH: NEGATIVE for ear, mouth and throat problems  RESP: NEGATIVE for significant cough or SOB  CV: NEGATIVE for chest pain, palpitations or peripheral edema  GI: NEGATIVE for nausea, abdominal pain, heartburn, or change in bowel habits  : NEGATIVE for frequency, dysuria, or hematuria  MUSCULOSKELETAL: NEGATIVE for significant arthralgias or myalgia  NEURO: NEGATIVE for weakness, dizziness or paresthesias  ENDOCRINE: NEGATIVE for temperature intolerance, skin/hair changes  HEME: NEGATIVE for bleeding problems  PSYCHIATRIC: NEGATIVE for changes in mood or affect    Patient Active Problem List    Diagnosis  Date Noted     Malignant neoplasm of upper-inner quadrant of right breast in female, estrogen receptor positive (H) 07/13/2021     Priority: Medium     Added automatically from request for surgery [id number]       Pre-diabetes 06/23/2021     Priority: Medium     Hyperlipidemia LDL goal <130 04/25/2017     Priority: Medium     Morbid obesity with BMI of 40.0-44.9, adult (H) 04/25/2017     Priority: Medium     Menopausal symptoms 05/05/2015     Priority: Medium     Mild major depression (H) 08/04/2014     Priority: Medium     Elevated fasting blood sugar 01/30/2014     Priority: Medium     Hypercholesterolemia 01/09/2013     Priority: Medium     Advanced directives, counseling/discussion 12/18/2012     Priority: Medium     Discussed advance care planning with patient; information given to patient to review. 12/18/2012     Stephanie Lopez MA         Hypertension goal BP (blood pressure) < 140/90 04/11/2012     Priority: Medium     Tobacco dependency 04/11/2012     Priority: Medium     Menopausal syndrome (hot flashes) 04/11/2012     Priority: Medium     DUB (dysfunctional uterine bleeding)      Priority: Medium     Factor 5 Leiden mutation, heterozygous (H) 10/25/2011     Priority: Medium     Family history of factor V Leiden mutation 09/08/2011     Priority: Medium      Past Medical History:   Diagnosis Date     DUB (dysfunctional uterine bleeding)      Essential hypertension      Menopausal disorder      Past Surgical History:   Procedure Laterality Date     CARPAL TUNNEL RELEASE RT/LT  11/17/99    RT     ZZC NONSPECIFIC PROCEDURE      Toenails     Current Outpatient Medications   Medication Sig Dispense Refill     hydrochlorothiazide (HYDRODIURIL) 25 MG tablet Take 1 tablet (25 mg) by mouth daily 90 tablet 1     lisinopril (ZESTRIL) 10 MG tablet TAKE ONE TABLET BY MOUTH ONE TIME DAILY 90 tablet 1     simvastatin (ZOCOR) 20 MG tablet TAKE ONE TABLET BY MOUTH AT BEDTIME 90 tablet 1     venlafaxine (EFFEXOR) 25 MG tablet  Take 1 tablet (25 mg) by mouth 2 times daily 180 tablet 1       Allergies   Allergen Reactions     Zoloft Rash        Social History     Tobacco Use     Smoking status: Former Smoker     Packs/day: 1.00     Years: 20.00     Pack years: 20.00     Types: Cigarettes     Smokeless tobacco: Never Used   Substance Use Topics     Alcohol use: Yes     Family History   Problem Relation Age of Onset     Diabetes Mother      Breast Cancer Mother      Cancer - colorectal Father      Prostate Cancer Father      Diabetes Maternal Grandfather      History   Drug Use No         Objective     There were no vitals taken for this visit.    Physical Exam    GENERAL APPEARANCE: healthy, alert and no distress     EYES: EOMI, PERRL     HENT: ear canals and TM's normal and nose and mouth without ulcers or lesions     NECK: no adenopathy, no asymmetry, masses, or scars and thyroid normal to palpation     RESP: lungs clear to auscultation - no rales, rhonchi or wheezes     CV: regular rates and rhythm, normal S1 S2, no S3 or S4 and no murmur, click or rub     ABDOMEN:  soft, nontender, no HSM or masses and bowel sounds normal     MS: extremities normal- no gross deformities noted, no evidence of inflammation in joints, FROM in all extremities.     SKIN: no suspicious lesions or rashes     NEURO: Normal strength and tone, sensory exam grossly normal, mentation intact and speech normal     PSYCH: mentation appears normal. and affect normal/bright     LYMPHATICS: No cervical adenopathy    Recent Labs   Lab Test 06/23/21  0949   HGB 13.6         POTASSIUM 4.5   CR 0.80   A1C 5.7*        Diagnostics:  No labs were ordered during this visit.   No EKG required for low risk surgery (cataract, skin procedure, breast biopsy, etc).    Revised Cardiac Risk Index (RCRI):  The patient has the following serious cardiovascular risks for perioperative complications:   - No serious cardiac risks = 0 points     RCRI Interpretation: 1 point: Class  II (low risk - 0.9% complication rate)           Signed Electronically by: Juan Jalloh PA-C  Copy of this evaluation report is provided to requesting physician.

## 2021-07-16 NOTE — PROGRESS NOTES
Answers for HPI/ROS submitted by the patient on 7/19/2021  If you checked off any problems, how difficult have these problems made it for you to do your work, take care of things at home, or get along with other people?: Not difficult at all  PHQ9 TOTAL SCORE: 0      [facility]  [address]  [place] [address]  Phone: [phone]  Primary Provider: Marlen Aguilar  Pre-op Performing Provider: ROBERT CORRALES      PREOPERATIVE EVALUATION:  Today's date: 7/19/2021    Roxie Woods is a 66 year old female who presents for a preoperative evaluation.    Surgical Information:  Surgery/Procedure: Right wire localized partial mastectomy with right sentinel lymph node biopsy with possible axillary dissection  Surgery Location: [facility]   Surgeon: Allan  Surgery Date: 07/22  Time of Surgery: 9AM  Where patient plans to recover: At home with family  Fax number for surgical facility: Note does not need to be faxed, will be available electronically in Epic.    Type of Anesthesia Anticipated: Choice    Assessment & Plan     The proposed surgical procedure is considered LOW risk.    Preop general physical exam  Malignant neoplasm of upper-inner quadrant of right breast in female, estrogen receptor positive (H)  Hypertension goal BP (blood pressure) < 140/90  Pre-diabetes  Factor 5 Leiden mutation, heterozygous (H)  Personal history of tobacco use, presenting hazards to health  Snoring  Morbid obesity with BMI of 40.0-44.9, adult (H)  Cleared for surgical intervention at this point in time.  Recent labs are reassuring.  Anesthesia staff should be made aware that there is a potential for sleep apnea though her overall screening questions are only positive for snoring.    Need for vaccination  Screening for HIV (human immunodeficiency virus)  Declines vaccination and screening today.           Risks and Recommendations:  The patient has the following additional risks and  recommendations for perioperative complications:   - No identified additional risk factors other than previously addressed    Medication Instructions:   - ACE/ARB: HOLD due to risk of hypotension during surgery.     RECOMMENDATION:  APPROVAL GIVEN to proceed with proposed procedure, without further diagnostic evaluation.    I did review the surgeon's notes as they are available to us.  We note that the note is not complete yet at this point in time.  Surgical intervention with respect to the right breast is planned.    Subjective     HPI related to upcoming procedure: Right sided breast cancer in need of further evaluation and treatment.      Health Care Directive:  Patient does not have a Health Care Directive or Living Will: Discussed advance care planning with patient; however, patient declined at this time.    Preoperative Review of :   reviewed - no record of controlled substances prescribed.      Status of Chronic Conditions:  See problem list for active medical problems.  Problems all longstanding and stable, except as noted/documented.  See ROS for pertinent symptoms related to these conditions.      Review of Systems  CONSTITUTIONAL: NEGATIVE for fever, chills, change in weight  INTEGUMENTARY/SKIN: NEGATIVE for worrisome rashes, moles or lesions  EYES: NEGATIVE for vision changes or irritation  ENT/MOUTH: NEGATIVE for ear, mouth and throat problems  RESP: NEGATIVE for significant cough or SOB  CV: NEGATIVE for chest pain, palpitations or peripheral edema  GI: NEGATIVE for nausea, abdominal pain, heartburn, or change in bowel habits  : NEGATIVE for frequency, dysuria, or hematuria  MUSCULOSKELETAL: NEGATIVE for significant arthralgias or myalgia  NEURO: NEGATIVE for weakness, dizziness or paresthesias  ENDOCRINE: NEGATIVE for temperature intolerance, skin/hair changes  HEME: NEGATIVE for bleeding problems  PSYCHIATRIC: NEGATIVE for changes in mood or affect    Patient Active Problem List    Diagnosis  Date Noted     Malignant neoplasm of upper-inner quadrant of right breast in female, estrogen receptor positive (H) 07/13/2021     Priority: Medium     Added automatically from request for surgery [id number]       Pre-diabetes 06/23/2021     Priority: Medium     Hyperlipidemia LDL goal <130 04/25/2017     Priority: Medium     Morbid obesity with BMI of 40.0-44.9, adult (H) 04/25/2017     Priority: Medium     Menopausal symptoms 05/05/2015     Priority: Medium     Mild major depression (H) 08/04/2014     Priority: Medium     Elevated fasting blood sugar 01/30/2014     Priority: Medium     Hypercholesterolemia 01/09/2013     Priority: Medium     Advanced directives, counseling/discussion 12/18/2012     Priority: Medium     Discussed advance care planning with patient; information given to patient to review. 12/18/2012     Stephanie Lopez MA         Hypertension goal BP (blood pressure) < 140/90 04/11/2012     Priority: Medium     Tobacco dependency 04/11/2012     Priority: Medium     Menopausal syndrome (hot flashes) 04/11/2012     Priority: Medium     DUB (dysfunctional uterine bleeding)      Priority: Medium     Factor 5 Leiden mutation, heterozygous (H) 10/25/2011     Priority: Medium     Family history of factor V Leiden mutation 09/08/2011     Priority: Medium      Past Medical History:   Diagnosis Date     DUB (dysfunctional uterine bleeding)      Essential hypertension      Menopausal disorder      Past Surgical History:   Procedure Laterality Date     CARPAL TUNNEL RELEASE RT/LT  11/17/99    RT     ZZC NONSPECIFIC PROCEDURE      Toenails     Current Outpatient Medications   Medication Sig Dispense Refill     hydrochlorothiazide (HYDRODIURIL) 25 MG tablet Take 1 tablet (25 mg) by mouth daily 90 tablet 1     lisinopril (ZESTRIL) 10 MG tablet TAKE ONE TABLET BY MOUTH ONE TIME DAILY 90 tablet 1     simvastatin (ZOCOR) 20 MG tablet TAKE ONE TABLET BY MOUTH AT BEDTIME 90 tablet 1     venlafaxine (EFFEXOR) 25 MG tablet  Take 1 tablet (25 mg) by mouth 2 times daily 180 tablet 1       Allergies   Allergen Reactions     Zoloft Rash        Social History     Tobacco Use     Smoking status: Former Smoker     Packs/day: 1.00     Years: 20.00     Pack years: 20.00     Types: Cigarettes     Smokeless tobacco: Never Used   Substance Use Topics     Alcohol use: Yes     Family History   Problem Relation Age of Onset     Diabetes Mother      Breast Cancer Mother      Cancer - colorectal Father      Prostate Cancer Father      Diabetes Maternal Grandfather      History   Drug Use No         Objective     There were no vitals taken for this visit.    Physical Exam    GENERAL APPEARANCE: healthy, alert and no distress     EYES: EOMI, PERRL     HENT: ear canals and TM's normal and nose and mouth without ulcers or lesions     NECK: no adenopathy, no asymmetry, masses, or scars and thyroid normal to palpation     RESP: lungs clear to auscultation - no rales, rhonchi or wheezes     CV: regular rates and rhythm, normal S1 S2, no S3 or S4 and no murmur, click or rub     ABDOMEN:  soft, nontender, no HSM or masses and bowel sounds normal     MS: extremities normal- no gross deformities noted, no evidence of inflammation in joints, FROM in all extremities.     SKIN: no suspicious lesions or rashes     NEURO: Normal strength and tone, sensory exam grossly normal, mentation intact and speech normal     PSYCH: mentation appears normal. and affect normal/bright     LYMPHATICS: No cervical adenopathy    Recent Labs   Lab Test 06/23/21  0949   HGB 13.6         POTASSIUM 4.5   CR 0.80   A1C 5.7*        Diagnostics:  No labs were ordered during this visit.   No EKG required for low risk surgery (cataract, skin procedure, breast biopsy, etc).    Revised Cardiac Risk Index (RCRI):  The patient has the following serious cardiovascular risks for perioperative complications:   - No serious cardiac risks = 0 points     RCRI Interpretation: 1 point: Class  II (low risk - 0.9% complication rate)           Signed Electronically by: Juan Jalloh PA-C  Copy of this evaluation report is provided to requesting physician.

## 2021-07-16 NOTE — TELEPHONE ENCOUNTER
Routed to provider to review and advise.  Pt requesting refills until 4/2022.    Per review of 4/9/2021 Virtual Visit note, provider intentionally only provided 6 months of refills.   Pt has now completed labs on 6/23/21.   Please advise if an additional 6 months of refills is appropriate, or when does pt need to be seen for follow-up or repeat labs.    4/9/2021 Virtual Visit note:  Hypertension goal BP (blood pressure) < 140/90  -blood pressure has not been checked in some time  -will check at her local pharmacy   -refills for 6 months provided, last time did not come in for labs.    Connie Victoria, LLOYDN, RN

## 2021-07-16 NOTE — PATIENT INSTRUCTIONS

## 2021-07-19 ENCOUNTER — OFFICE VISIT (OUTPATIENT)
Dept: FAMILY MEDICINE | Facility: OTHER | Age: 66
End: 2021-07-19
Payer: COMMERCIAL

## 2021-07-19 ENCOUNTER — LAB (OUTPATIENT)
Dept: LAB | Facility: OTHER | Age: 66
End: 2021-07-19
Payer: COMMERCIAL

## 2021-07-19 VITALS
SYSTOLIC BLOOD PRESSURE: 126 MMHG | OXYGEN SATURATION: 95 % | RESPIRATION RATE: 20 BRPM | BODY MASS INDEX: 42.57 KG/M2 | HEART RATE: 106 BPM | DIASTOLIC BLOOD PRESSURE: 84 MMHG | TEMPERATURE: 97.3 F | WEIGHT: 236.5 LBS

## 2021-07-19 DIAGNOSIS — Z11.4 SCREENING FOR HIV (HUMAN IMMUNODEFICIENCY VIRUS): ICD-10-CM

## 2021-07-19 DIAGNOSIS — I10 HYPERTENSION GOAL BP (BLOOD PRESSURE) < 140/90: ICD-10-CM

## 2021-07-19 DIAGNOSIS — Z11.59 ENCOUNTER FOR SCREENING FOR OTHER VIRAL DISEASES: ICD-10-CM

## 2021-07-19 DIAGNOSIS — Z17.0 MALIGNANT NEOPLASM OF UPPER-INNER QUADRANT OF RIGHT BREAST IN FEMALE, ESTROGEN RECEPTOR POSITIVE (H): ICD-10-CM

## 2021-07-19 DIAGNOSIS — D68.51 FACTOR 5 LEIDEN MUTATION, HETEROZYGOUS (H): ICD-10-CM

## 2021-07-19 DIAGNOSIS — C50.211 MALIGNANT NEOPLASM OF UPPER-INNER QUADRANT OF RIGHT BREAST IN FEMALE, ESTROGEN RECEPTOR POSITIVE (H): ICD-10-CM

## 2021-07-19 DIAGNOSIS — R06.83 SNORING: ICD-10-CM

## 2021-07-19 DIAGNOSIS — R73.03 PRE-DIABETES: ICD-10-CM

## 2021-07-19 DIAGNOSIS — Z01.818 PREOP GENERAL PHYSICAL EXAM: Primary | ICD-10-CM

## 2021-07-19 DIAGNOSIS — Z23 NEED FOR VACCINATION: ICD-10-CM

## 2021-07-19 DIAGNOSIS — Z87.891 PERSONAL HISTORY OF TOBACCO USE, PRESENTING HAZARDS TO HEALTH: ICD-10-CM

## 2021-07-19 DIAGNOSIS — E66.01 MORBID OBESITY WITH BMI OF 40.0-44.9, ADULT (H): ICD-10-CM

## 2021-07-19 PROCEDURE — U0003 INFECTIOUS AGENT DETECTION BY NUCLEIC ACID (DNA OR RNA); SEVERE ACUTE RESPIRATORY SYNDROME CORONAVIRUS 2 (SARS-COV-2) (CORONAVIRUS DISEASE [COVID-19]), AMPLIFIED PROBE TECHNIQUE, MAKING USE OF HIGH THROUGHPUT TECHNOLOGIES AS DESCRIBED BY CMS-2020-01-R: HCPCS

## 2021-07-19 PROCEDURE — 99214 OFFICE O/P EST MOD 30 MIN: CPT | Performed by: PHYSICIAN ASSISTANT

## 2021-07-19 PROCEDURE — U0005 INFEC AGEN DETEC AMPLI PROBE: HCPCS

## 2021-07-19 ASSESSMENT — PATIENT HEALTH QUESTIONNAIRE - PHQ9
10. IF YOU CHECKED OFF ANY PROBLEMS, HOW DIFFICULT HAVE THESE PROBLEMS MADE IT FOR YOU TO DO YOUR WORK, TAKE CARE OF THINGS AT HOME, OR GET ALONG WITH OTHER PEOPLE: NOT DIFFICULT AT ALL
SUM OF ALL RESPONSES TO PHQ QUESTIONS 1-9: 0
SUM OF ALL RESPONSES TO PHQ QUESTIONS 1-9: 0

## 2021-07-20 LAB — SARS-COV-2 RNA RESP QL NAA+PROBE: NEGATIVE

## 2021-07-20 ASSESSMENT — PATIENT HEALTH QUESTIONNAIRE - PHQ9: SUM OF ALL RESPONSES TO PHQ QUESTIONS 1-9: 0

## 2021-07-21 ENCOUNTER — TELEPHONE (OUTPATIENT)
Dept: SURGERY | Facility: CLINIC | Age: 66
End: 2021-07-21

## 2021-07-21 NOTE — TELEPHONE ENCOUNTER
Spoke with pt; will proceed with her surgery as her HER fish is (-).   All her questions were answered.     [facility] Allan, DO

## 2021-07-21 NOTE — TELEPHONE ENCOUNTER
I spoke with the patient regarding her HER-2 result.  It is still in process so therefore we do not have any answer whether it is positive or negative to proceed with her surgery.  I informed her that I already notified the cytopathology and they do not know when it will result.  They will definitely call me on my cell phone as soon as they have the result.  I forewarned the patient that her surgery may be canceled for tomorrow.  We will wait until 5 PM and if I do not hear anything from pathology I will call the patient to notify her of canceling her surgery.  Patient is very understanding of this we will hopefully get an answer by 5 PM.     [facility], DO

## 2021-07-21 NOTE — TELEPHONE ENCOUNTER
Do you need her added to schedule at a certain time?     Jenny BLACKWELL, Lead RN, BSN. . .  7/21/2021, 9:12 AM

## 2021-07-21 NOTE — TELEPHONE ENCOUNTER
Reason for Call:  Other call back    Detailed comments: pt was called by dr Allan to request a time she could be called at this afternoon- pt calling back to state that she will be available anytime this afternoon for her call    Phone Number Patient can be reached at: Home number on file [phone] (home)    Best Time: any    Can we leave a detailed message on this number? YES    Call taken on 7/21/2021 at 8:55 AM by Keisha Rocha

## 2021-07-22 ENCOUNTER — ANESTHESIA (OUTPATIENT)
Dept: SURGERY | Facility: CLINIC | Age: 66
End: 2021-07-22
Payer: COMMERCIAL

## 2021-07-22 ENCOUNTER — HOSPITAL ENCOUNTER (OUTPATIENT)
Dept: NUCLEAR MEDICINE | Facility: CLINIC | Age: 66
Setting detail: NUCLEAR MEDICINE
Discharge: HOME | End: 2021-07-22
Attending: SURGERY | Admitting: SURGERY
Payer: COMMERCIAL

## 2021-07-22 ENCOUNTER — HOSPITAL ENCOUNTER (OUTPATIENT)
Facility: CLINIC | Age: 66
Discharge: HOME | End: 2021-07-22
Attending: SURGERY | Admitting: SURGERY
Payer: COMMERCIAL

## 2021-07-22 ENCOUNTER — HOSPITAL ENCOUNTER (OUTPATIENT)
Dept: ULTRASOUND IMAGING | Facility: CLINIC | Age: 66
End: 2021-07-22
Attending: SURGERY
Payer: COMMERCIAL

## 2021-07-22 ENCOUNTER — ANESTHESIA EVENT (OUTPATIENT)
Dept: SURGERY | Facility: CLINIC | Age: 66
End: 2021-07-22
Payer: COMMERCIAL

## 2021-07-22 ENCOUNTER — HOSPITAL ENCOUNTER (OUTPATIENT)
Dept: MAMMOGRAPHY | Facility: CLINIC | Age: 66
End: 2021-07-22
Attending: SURGERY
Payer: COMMERCIAL

## 2021-07-22 VITALS
OXYGEN SATURATION: 93 % | HEART RATE: 90 BPM | RESPIRATION RATE: 16 BRPM | TEMPERATURE: 97.7 F | SYSTOLIC BLOOD PRESSURE: 138 MMHG | DIASTOLIC BLOOD PRESSURE: 73 MMHG

## 2021-07-22 DIAGNOSIS — Z17.0 MALIGNANT NEOPLASM OF UPPER-INNER QUADRANT OF RIGHT BREAST IN FEMALE, ESTROGEN RECEPTOR POSITIVE (H): ICD-10-CM

## 2021-07-22 DIAGNOSIS — C50.211 MALIGNANT NEOPLASM OF UPPER-INNER QUADRANT OF RIGHT BREAST IN FEMALE, ESTROGEN RECEPTOR POSITIVE (H): ICD-10-CM

## 2021-07-22 DIAGNOSIS — Z90.11 S/P PARTIAL MASTECTOMY, RIGHT: Primary | ICD-10-CM

## 2021-07-22 LAB — COPATH REPORT: NORMAL

## 2021-07-22 PROCEDURE — 370N000017 HC ANESTHESIA TECHNICAL FEE, PER MIN: Performed by: SURGERY

## 2021-07-22 PROCEDURE — C1819 TISSUE LOCALIZATION-EXCISION: HCPCS | Mod: RT

## 2021-07-22 PROCEDURE — 88342 IMHCHEM/IMCYTCHM 1ST ANTB: CPT | Mod: TC | Performed by: SURGERY

## 2021-07-22 PROCEDURE — 272N000001 HC OR GENERAL SUPPLY STERILE: Performed by: SURGERY

## 2021-07-22 PROCEDURE — 710N000010 HC RECOVERY PHASE 1, LEVEL 2, PER MIN: Performed by: SURGERY

## 2021-07-22 PROCEDURE — 250N000009 HC RX 250: Performed by: NURSE ANESTHETIST, CERTIFIED REGISTERED

## 2021-07-22 PROCEDURE — 250N000026 HC DESFLURANE, PER MIN: Performed by: SURGERY

## 2021-07-22 PROCEDURE — 250N000011 HC RX IP 250 OP 636: Performed by: SURGERY

## 2021-07-22 PROCEDURE — 999N000065 MA POST PROCEDURE RIGHT

## 2021-07-22 PROCEDURE — 258N000003 HC RX IP 258 OP 636: Performed by: NURSE ANESTHETIST, CERTIFIED REGISTERED

## 2021-07-22 PROCEDURE — 38525 BIOPSY/REMOVAL LYMPH NODES: CPT | Performed by: SURGERY

## 2021-07-22 PROCEDURE — 250N000013 HC RX MED GY IP 250 OP 250 PS 637: Performed by: SURGERY

## 2021-07-22 PROCEDURE — 710N000012 HC RECOVERY PHASE 2, PER MINUTE: Performed by: SURGERY

## 2021-07-22 PROCEDURE — 250N000025 HC SEVOFLURANE, PER MIN: Performed by: SURGERY

## 2021-07-22 PROCEDURE — 250N000011 HC RX IP 250 OP 636: Performed by: NURSE ANESTHETIST, CERTIFIED REGISTERED

## 2021-07-22 PROCEDURE — 38900 IO MAP OF SENT LYMPH NODE: CPT | Performed by: SURGERY

## 2021-07-22 PROCEDURE — 250N000009 HC RX 250: Performed by: SURGERY

## 2021-07-22 PROCEDURE — 76098 X-RAY EXAM SURGICAL SPECIMEN: CPT

## 2021-07-22 PROCEDURE — 343N000001 HC RX 343: Performed by: SURGERY

## 2021-07-22 PROCEDURE — 19301 PARTIAL MASTECTOMY: CPT | Mod: RT | Performed by: SURGERY

## 2021-07-22 PROCEDURE — 38792 RA TRACER ID OF SENTINL NODE: CPT

## 2021-07-22 PROCEDURE — 76998 US GUIDE INTRAOP: CPT | Mod: 26 | Performed by: SURGERY

## 2021-07-22 PROCEDURE — 360N000077 HC SURGERY LEVEL 4, PER MIN: Performed by: SURGERY

## 2021-07-22 PROCEDURE — 88307 TISSUE EXAM BY PATHOLOGIST: CPT | Mod: 26 | Performed by: PATHOLOGY

## 2021-07-22 PROCEDURE — 78195 LYMPH SYSTEM IMAGING: CPT | Mod: 26 | Performed by: SURGERY

## 2021-07-22 PROCEDURE — A9520 TC99 TILMANOCEPT DIAG 0.5MCI: HCPCS | Performed by: SURGERY

## 2021-07-22 PROCEDURE — 88305 TISSUE EXAM BY PATHOLOGIST: CPT | Mod: 26 | Performed by: PATHOLOGY

## 2021-07-22 PROCEDURE — 19285 PERQ DEV BREAST 1ST US IMAG: CPT | Mod: RT

## 2021-07-22 PROCEDURE — 88342 IMHCHEM/IMCYTCHM 1ST ANTB: CPT | Mod: 26 | Performed by: PATHOLOGY

## 2021-07-22 PROCEDURE — 999N000141 HC STATISTIC PRE-PROCEDURE NURSING ASSESSMENT: Performed by: SURGERY

## 2021-07-22 PROCEDURE — 11422 EXC H-F-NK-SP B9+MARG 1.1-2: CPT | Mod: 51 | Performed by: SURGERY

## 2021-07-22 RX ORDER — ALBUTEROL SULFATE 0.83 MG/ML
2.5 SOLUTION RESPIRATORY (INHALATION) EVERY 4 HOURS PRN
Status: CANCELLED | OUTPATIENT
Start: 2021-07-22

## 2021-07-22 RX ORDER — OXYCODONE HYDROCHLORIDE 5 MG/1
5 TABLET ORAL
Status: DISCONTINUED | OUTPATIENT
Start: 2021-07-22 | End: 2021-07-22 | Stop reason: HOSPADM

## 2021-07-22 RX ORDER — ONDANSETRON 4 MG/1
4 TABLET, ORALLY DISINTEGRATING ORAL EVERY 30 MIN PRN
Status: CANCELLED | OUTPATIENT
Start: 2021-07-22

## 2021-07-22 RX ORDER — MEPERIDINE HYDROCHLORIDE 25 MG/ML
12.5 INJECTION INTRAMUSCULAR; INTRAVENOUS; SUBCUTANEOUS
Status: CANCELLED | OUTPATIENT
Start: 2021-07-22

## 2021-07-22 RX ORDER — ONDANSETRON 2 MG/ML
INJECTION INTRAMUSCULAR; INTRAVENOUS PRN
Status: DISCONTINUED | OUTPATIENT
Start: 2021-07-22 | End: 2021-07-22

## 2021-07-22 RX ORDER — ACETAMINOPHEN 325 MG/1
975 TABLET ORAL ONCE
Status: COMPLETED | OUTPATIENT
Start: 2021-07-22 | End: 2021-07-22

## 2021-07-22 RX ORDER — PROPOFOL 10 MG/ML
INJECTION, EMULSION INTRAVENOUS PRN
Status: DISCONTINUED | OUTPATIENT
Start: 2021-07-22 | End: 2021-07-22

## 2021-07-22 RX ORDER — HEPARIN SODIUM 5000 [USP'U]/.5ML
5000 INJECTION, SOLUTION INTRAVENOUS; SUBCUTANEOUS
Status: COMPLETED | OUTPATIENT
Start: 2021-07-22 | End: 2021-07-22

## 2021-07-22 RX ORDER — KETOROLAC TROMETHAMINE 30 MG/ML
INJECTION, SOLUTION INTRAMUSCULAR; INTRAVENOUS PRN
Status: DISCONTINUED | OUTPATIENT
Start: 2021-07-22 | End: 2021-07-22

## 2021-07-22 RX ORDER — ONDANSETRON 2 MG/ML
4 INJECTION INTRAMUSCULAR; INTRAVENOUS EVERY 30 MIN PRN
Status: CANCELLED | OUTPATIENT
Start: 2021-07-22

## 2021-07-22 RX ORDER — SODIUM CHLORIDE, SODIUM LACTATE, POTASSIUM CHLORIDE, CALCIUM CHLORIDE 600; 310; 30; 20 MG/100ML; MG/100ML; MG/100ML; MG/100ML
INJECTION, SOLUTION INTRAVENOUS CONTINUOUS
Status: CANCELLED | OUTPATIENT
Start: 2021-07-22

## 2021-07-22 RX ORDER — FENTANYL CITRATE 50 UG/ML
25-50 INJECTION, SOLUTION INTRAMUSCULAR; INTRAVENOUS EVERY 5 MIN PRN
Status: CANCELLED | OUTPATIENT
Start: 2021-07-22 | End: 2021-07-22

## 2021-07-22 RX ORDER — DIMENHYDRINATE 50 MG/ML
INJECTION, SOLUTION INTRAMUSCULAR; INTRAVENOUS PRN
Status: DISCONTINUED | OUTPATIENT
Start: 2021-07-22 | End: 2021-07-22

## 2021-07-22 RX ORDER — HYDROMORPHONE HYDROCHLORIDE 1 MG/ML
.2-.5 INJECTION, SOLUTION INTRAMUSCULAR; INTRAVENOUS; SUBCUTANEOUS EVERY 5 MIN PRN
Status: CANCELLED | OUTPATIENT
Start: 2021-07-22 | End: 2021-07-22

## 2021-07-22 RX ORDER — BUPIVACAINE HYDROCHLORIDE AND EPINEPHRINE 2.5; 5 MG/ML; UG/ML
INJECTION, SOLUTION INFILTRATION; PERINEURAL PRN
Status: DISCONTINUED | OUTPATIENT
Start: 2021-07-22 | End: 2021-07-22 | Stop reason: HOSPADM

## 2021-07-22 RX ORDER — METOPROLOL TARTRATE 1 MG/ML
1-2 INJECTION, SOLUTION INTRAVENOUS EVERY 5 MIN PRN
Status: CANCELLED | OUTPATIENT
Start: 2021-07-22

## 2021-07-22 RX ORDER — CEFAZOLIN SODIUM 2 G/100ML
2 INJECTION, SOLUTION INTRAVENOUS
Status: DISCONTINUED | OUTPATIENT
Start: 2021-07-22 | End: 2021-07-22 | Stop reason: HOSPADM

## 2021-07-22 RX ORDER — FENTANYL CITRATE 50 UG/ML
INJECTION, SOLUTION INTRAMUSCULAR; INTRAVENOUS PRN
Status: DISCONTINUED | OUTPATIENT
Start: 2021-07-22 | End: 2021-07-22

## 2021-07-22 RX ORDER — HYDRALAZINE HYDROCHLORIDE 20 MG/ML
2.5-5 INJECTION INTRAMUSCULAR; INTRAVENOUS EVERY 10 MIN PRN
Status: CANCELLED | OUTPATIENT
Start: 2021-07-22

## 2021-07-22 RX ORDER — LIDOCAINE 40 MG/G
CREAM TOPICAL
Status: DISCONTINUED | OUTPATIENT
Start: 2021-07-22 | End: 2021-07-22 | Stop reason: HOSPADM

## 2021-07-22 RX ORDER — OXYCODONE HYDROCHLORIDE 5 MG/1
5 TABLET ORAL EVERY 6 HOURS PRN
Qty: 12 TABLET | Refills: 0 | Status: SHIPPED | OUTPATIENT
Start: 2021-07-22 | End: 2021-09-10

## 2021-07-22 RX ORDER — BUPIVACAINE HYDROCHLORIDE 2.5 MG/ML
INJECTION, SOLUTION EPIDURAL; INFILTRATION; INTRACAUDAL PRN
Status: DISCONTINUED | OUTPATIENT
Start: 2021-07-22 | End: 2021-07-22 | Stop reason: HOSPADM

## 2021-07-22 RX ORDER — LIDOCAINE HYDROCHLORIDE 10 MG/ML
10 INJECTION, SOLUTION EPIDURAL; INFILTRATION; INTRACAUDAL; PERINEURAL ONCE
Status: COMPLETED | OUTPATIENT
Start: 2021-07-22 | End: 2021-07-22

## 2021-07-22 RX ORDER — DIMENHYDRINATE 50 MG/ML
25 INJECTION, SOLUTION INTRAMUSCULAR; INTRAVENOUS
Status: CANCELLED | OUTPATIENT
Start: 2021-07-22

## 2021-07-22 RX ORDER — CEFAZOLIN SODIUM 2 G/100ML
2 INJECTION, SOLUTION INTRAVENOUS SEE ADMIN INSTRUCTIONS
Status: DISCONTINUED | OUTPATIENT
Start: 2021-07-22 | End: 2021-07-22 | Stop reason: HOSPADM

## 2021-07-22 RX ORDER — SODIUM CHLORIDE, SODIUM LACTATE, POTASSIUM CHLORIDE, CALCIUM CHLORIDE 600; 310; 30; 20 MG/100ML; MG/100ML; MG/100ML; MG/100ML
INJECTION, SOLUTION INTRAVENOUS CONTINUOUS
Status: DISCONTINUED | OUTPATIENT
Start: 2021-07-22 | End: 2021-07-22 | Stop reason: HOSPADM

## 2021-07-22 RX ORDER — LIDOCAINE HYDROCHLORIDE 20 MG/ML
INJECTION, SOLUTION INFILTRATION; PERINEURAL PRN
Status: DISCONTINUED | OUTPATIENT
Start: 2021-07-22 | End: 2021-07-22

## 2021-07-22 RX ADMIN — HEPARIN SODIUM 5000 UNITS: 10000 INJECTION, SOLUTION INTRAVENOUS; SUBCUTANEOUS at 09:43

## 2021-07-22 RX ADMIN — SODIUM CHLORIDE, POTASSIUM CHLORIDE, SODIUM LACTATE AND CALCIUM CHLORIDE: 600; 310; 30; 20 INJECTION, SOLUTION INTRAVENOUS at 08:32

## 2021-07-22 RX ADMIN — LIDOCAINE HYDROCHLORIDE 0.2 ML: 10 INJECTION, SOLUTION EPIDURAL; INFILTRATION; INTRACAUDAL; PERINEURAL at 08:33

## 2021-07-22 RX ADMIN — CEFAZOLIN SODIUM 2 G: 2 INJECTION, SOLUTION INTRAVENOUS at 09:37

## 2021-07-22 RX ADMIN — FENTANYL CITRATE 50 MCG: 50 INJECTION, SOLUTION INTRAMUSCULAR; INTRAVENOUS at 09:34

## 2021-07-22 RX ADMIN — ACETAMINOPHEN 975 MG: 325 TABLET, FILM COATED ORAL at 08:32

## 2021-07-22 RX ADMIN — HYDROMORPHONE HYDROCHLORIDE 0.5 MG: 1 INJECTION, SOLUTION INTRAMUSCULAR; INTRAVENOUS; SUBCUTANEOUS at 10:16

## 2021-07-22 RX ADMIN — KETOROLAC TROMETHAMINE 30 MG: 30 INJECTION, SOLUTION INTRAMUSCULAR at 11:27

## 2021-07-22 RX ADMIN — ONDANSETRON 4 MG: 2 INJECTION INTRAMUSCULAR; INTRAVENOUS at 11:27

## 2021-07-22 RX ADMIN — MIDAZOLAM 2 MG: 1 INJECTION INTRAMUSCULAR; INTRAVENOUS at 09:34

## 2021-07-22 RX ADMIN — LIDOCAINE HYDROCHLORIDE 5 ML: 10 INJECTION, SOLUTION EPIDURAL; INFILTRATION; INTRACAUDAL; PERINEURAL at 09:00

## 2021-07-22 RX ADMIN — DIMENHYDRINATE 25 MG: 50 INJECTION, SOLUTION INTRAMUSCULAR; INTRAVENOUS at 09:47

## 2021-07-22 RX ADMIN — LIDOCAINE HYDROCHLORIDE 60 MG: 20 INJECTION, SOLUTION INFILTRATION; PERINEURAL at 09:39

## 2021-07-22 RX ADMIN — PROPOFOL 170 MG: 10 INJECTION, EMULSION INTRAVENOUS at 09:40

## 2021-07-22 RX ADMIN — FENTANYL CITRATE 50 MCG: 50 INJECTION, SOLUTION INTRAMUSCULAR; INTRAVENOUS at 11:39

## 2021-07-22 RX ADMIN — TILMANOCEPT 0.5 MILLICURIE: KIT at 09:00

## 2021-07-22 ASSESSMENT — LIFESTYLE VARIABLES: TOBACCO_USE: 1

## 2021-07-22 NOTE — DISCHARGE INSTRUCTIONS
[facility] Discharge Instructions     What are my post-op activity restrictions?  Do not drive or operate power equipment or engage in activities that require coordination or the ability to respond quickly for 24 hours.    Walking: You may walk without help     Lifting: Limit lifting to 10 pounds   Straining: Avoid activities that cause you to strain.    Stairs: You may climb stairs.    Strenuous activities: Strenuous, repetitive activities are to be avoided with the affected arm.     Bathing: You may shower in 24-48 hours.    Weight bearing: Full weight bearing on the affected side (use a gallon of milk as your restriction for the first 2 weeks).    Elevate: Elevate arm on the surgery side when at rest    Driving: If you drove prior to surgery, you may resume driving when you are not taking pain medication and able to move the affected arm freely. Do not drive while taking pain medications.    Special Exercises: Light stretching of the affected arm is fine.  What care does my wound require?  Remove your ace wrap or surgical bra in 24 hours. You may get your incision wet in 48 hours (but no tubs, hot tubs, pools, lakes). You may replace your dressing if needed.  Leave the glue on the incision alone, you may take them off after 2 weeks (if applicable). Wear bra or ACE wrap at all times,when not in the shower, if you have had a mastectomy, for the first two weeks.  Wear ACE wrap or bra for your comfort if you had a lumpectomy or biopsy.    Drain care:  1.  Please keep drain site clean and dry.   2.  You may shower with the drain - redress it if the biopatch (round [place]) is loose with a tegaderm  3.  Please call the clinic if:    3a. Your drain falls out.    3b. The stitch that is holding the drain in place at the skin is coming loose or missing.    3c. The drainage fluid is very thick and/or has a bad odor.    3d. The squeeze bulb does not stay collapsed.    3e. The drainage suddenly stops or  dramatically decreases when the drain has been having steady amounts of drainage.  4. Please keep a log of the drainage amounts every time you empty the drain (date, time, which drain, drain amount).  Dr Allan will assess the output amounts to determine when she can remove the drain at your follow-up appointment.  5. Please  strip  the drain 3 times per day:    5a. Wash your hands with soap and water and dry.    5b. Gently squeeze the tubing if you see a clot to loosen it up.    5c.  and pinch the drain where it comes out of the skin with one hand, to steady it.  With the other hand,  and pinch the drain and slide your fingers down the tubing, towards the drainage bulb.  Then release your  on the end where the tube comes out of your body, followed by releasing your  at the end of the drainage bulb.      5d. Repeat several times.    5e. It may be easier to  strip  the drain with an alcohol swab or with hand cleanser on the hand that is moving along the tube.    5f. Wash your hands again.  What can I eat?  You can resume eating your previous diet.  What do I need to know concerning my pain medication?  Avoid alcohol while taking pain medications. Pain medications may cause constipation. You may increase fluids. You may increase fiber intake. You may take an over the counter laxative or stool softener per package instructions if needed.   Use the following medications (in addition to your normal meds) as shown:  a. Oxycodone or hydrocodone 5 mg 1-2 every 6 hours as needed for pain.   b. Tylenol (acetaminophen) 500 mg every 6 hours as needed for pain. Do not take more than 1000 mg every 6 hours (see above).  c. Motrin (ibuprofen) 200-800 mg every 6 hours as needed for pain. Take with food.  Special Instructions (if applicable):   - 10 deep breaths every hour while awake.  You may experience some of the following which are normal:    It is normal to have pain after surgery. Take medications as ordered to  reduce the pain to a tolerable level.    Pain medications may make you sleepy.    Incision- you may expect a minimal amount of blood or drainage.    Bruising at the operative site     If you have had general anesthesia, you may have a sore throat for the first 24 hours due to the airway placed in your windpipe. You may also experience dizziness, drowsiness, or lightheadedness after anesthesia or sedation.     You may feel tired, rest as needed  Report the following signs and symptoms of complications to your surgeon:    Fever above 101 degrees not responding to Tylenol and lasting 4 hours.    Persistent nausea and vomiting.    Excessive pain not controlled with prescribed medication.    Difficulty breathing or unusual shortness of breath.     Unexpected amount of blood/drainage,     New numbness or tingling.    Difficulty urinating.    Call [facility] Allan, DO or the physician on-call by contacting the office to report concerns or for after hours contact information. If the doctor is not available, please go to your local emergency room.    [facility] Same-Day Surgery   Adult Discharge Orders & Instructions after anesthesia    For 24 hours after surgery    1. Get plenty of rest.  A responsible adult must stay with you for at least 24 hours after you leave the hospital.   2. Do not drive or use heavy equipment.  If you have weakness or tingling, don't drive or use heavy equipment until this feeling goes away.  3. Do not drink alcohol.  4. Avoid strenuous or risky activities.  Ask for help when climbing stairs.   5. You may feel lightheaded.  If so, sit for a few minutes before standing.  Have someone help you get up.   6. You may have a slight fever. Call the doctor if your fever is over 100 F (37.7 C) (taken under the tongue) or lasts longer than 24 hours.  7. You may have a dry mouth, a sore throat, muscle aches or trouble sleeping.  These should go away after 24 hours.  8. Do not make important or legal  decisions.  We don t expect you to have any problems from the surgery or treatment you had today. Just in case, here s what to do if you have pain, upset stomach (nausea), bleeding or infection:  Pain:  Take medicines your doctor has prescribed or over-the-counter medicine they have suggested. Resting and using ice packs can help, too. For surgery on an arm or leg, raise it on a pillow to ease swelling. Call your doctor if these methods don t work.  Copyright Shailesh Carl, Licensed under CC4.0 [employer]  Upset stomach (nausea):  Take anti-nausea medicine approved by your doctor. Drink clear liquids like apple juice, ginger ale, broth or 7-Up. Be sure to drink enough fluids. Rest can help, too. Move to normal foods when you re ready.   Bleeding:  In the first 24 hours, you may see a little blood on your dressing, about the size of a quarter. You don t need to worry about this much blood, but if the blood spot keeps getting bigger:    Put pressure on the wound if you can, AND    Call your doctor.  Copyright [employer], Licensed under CC4.0 [employer]  Fever/Infection: Please call your doctor if you have any of these signs:    Redness    Swelling    Wound feels warm    Pain gets worse    Bad-smelling fluid leaks from wound    Fever or chills  Call your doctor for any of the followin.  It has been over 8 to 10 hours since surgery and you are still not able to urinate (pass water).    2.  Headache for over 24 hours.    Nurse advice line: [phone]

## 2021-07-22 NOTE — ANESTHESIA PROCEDURE NOTES
Airway       Patient location during procedure: OR  Staff -        CRNA: Rainer Chen APRN CRNA       Performed By: CRNA  Consent for Airway        Urgency: elective  Indications and Patient Condition       Indications for airway management: indra-procedural       Induction type:intravenous       Mask difficulty assessment: 1 - vent by mask    Final Airway Details       Final airway type: supraglottic airway    Supraglottic Airway Details        Type: LMA       Brand: LMA Unique       LMA size: 4    Post intubation assessment        Placement verified by: capnometry, equal breath sounds and chest rise        Number of attempts at approach: 1       Number of other approaches attempted: 0       Secured with: plastic tape       Ease of procedure: easy       Dentition: Intact and Unchanged

## 2021-07-22 NOTE — PROGRESS NOTES
SBAR Wire Localization     SITUATION:  Patient to breast imaging center for imaging guided wire localizations before breast lumpectomy or excision biopsy with sentinel node injection.    BACKGROUND:  Breast imaging cancer, breast abnormality  Ordered procedure completed: Yes  Special needs identified: Yes     ASSESSMENT:  SBAR report called to patient care unit because of unexpected event in radiology: Yes  Allergies and medication list reviewed prior to procedure. Yes  Skin cleansed with ChloraPrep One-Step.  Anesthesia: approximately 5ml of 1% Lidocaine injection subcutaneous before wire insertion administered by the radiologist.   Gauze dressing over insertion site(s).  Post procedure mammogram completed: Yes    Patient tolerance:good    RECOMMENDATIONS:  Patient transferred to Same Day Surgery in stable condition via wheelchair with Breast Imaging Staff.    Please call [facility] [place] [phone] if there are any questions.

## 2021-07-22 NOTE — OP NOTE
OPERATIVE NOTE  [facility] SURGERY    DATE:  7/22/2021    PREOPERATIVE DIAGNOSES:     1. Right breast invasive carcinoma  2. BMI 42  3. Tobacco abuse  4. Factor 5 leiden mutation  5. Left neck skin lesion ~1.5cm      POSTOPERATIVE DIAGNOSES:     1.  same      PROCEDURE:   1.  Injection of technetium 99.   2.  Injection of lymphurium   3.  Intraoperative lymphoscintigraphy.   4.  [place] lymph node biopsy to the right axilla.   5.  Wire localized partial mastectomy.   6.  Excision of left neck skin lesion 3x1cm  7.  Intraoperative right breast limited ultrasound      SURGEON:  Agustín Allan DO       ASSISTANT:  A single scrub tech.       SPECIMENS:   1.  [place] lymph node#1- blue node #[id number];  #2 - 922  2.  right breast mass with clip, wire, mass with area of concern     3.  Left neck skin lesion 3x1cm      ANESTHESIA:  General endotracheal anesthesia.       INDICATIONS:  Roxie Woods is a 66 year old female who presents with biopsy proven invasive ductal carcinoma along with left neck skin lesion .  Risks, benefits and alternatives were explained to the patient.  She showed understanding and wished to proceed with the above.         DESCRIPTION OF PROCEDURE:    The patient was properly identified in the preop holding area.  The patient was then taken to the radiology suite.  Interventional radiology utilizing ultrasound guided, a wire was placed to the area of concern on the right breast.  The patient then came back to the preoperative suite where with the help of a nuclear medicine I injected technetium 99 in the preop holding area into the right breast intradermally and this was massaged for several minutes.  Consent was signed and approximately an hour later the patient was then brought back to the operative suite.  The patient was then placed in a supine position.  Anesthesia was induced per anesthesia protocol.  We started our procedure by infiltrating isosulfuran blue into the right breast  intradermally at the nipple areolar complex.  This was massaged for several minutes.  The patient was then prepped and draped in the usual sterile fashion.  A timeout was then done to confirm the correct patient, positioning and procedure.      I started the procedure by focusing on the left neck skin lesion.  This is an abnormal nevus.  Thus I drew an ellipse shape around this area.  Utilizing local anesthetic this area was infiltrated.  Utilizing a #15 blade scalpel I incised around the dry and elliptical shape.  This got down to the subcutaneous fat.  And the entire area was completely excised.  Measure of the wound was 3.1 cm.  This was primary closed with 3 separate simple interrupted 4-0 Prolene.  Hemostasis was achieved.  And a Primapore was placed.     At this point, we focused onto the right breast, noted the wire.  Utilizing the ultrasound that was sterilely draped, I use it to find the area of the clip.  I drew the margin around this area superiorly inferiorly and also medially and laterally with the probe. I reviewed the film prior to starting of the case, utilizing local infiltration we infiltrate the area around the wire.  Utilizing a #15 blade scalpel, a curvilinear linear incision was then made lateral to the entry site of the wire.  This was deepened down into the breast tissue with the electrocautery Bovie after initial incision utilizing Amna retractors.  Flaps were then made superiorly and utilizing the wire as a guide, I was able to excise a good size breast tissue circumferentially around the wire to ensure good oncologic margin.  This was done utilizing retractions and also the electrical cautery Bovie.  Once I completely excised this large breast tissue that encompassed the mass and the wire, I reoriented and labeled the mass with the Vector inking system and this was sent for radiology.  Radiology did call back to confirm that the mass does contain the clip and the wire that was placed.   Therefore, at this point the mass will be sent to pathology for further examination.  We achieve hemostasis utilizing the electrical cautery Bovie.  Hemoclips were placed in the cavity of the breast to kaya the superior, inferior, medial and lateral margins of dissection. Flaps were raised under pectoral fascia circumferentially; the anterior skin flaps were also raise circumferentially to allow for more cosmetic closure and to reduce the dead space.  The breast parenchyma was closed in layers with 2-0 vicryl.   At this point, we copiously irrigate the area further noted that hemostasis was achieved.At this point, we copiously irrigate the area further noted that hemostasis was achieved.  This area was then covered with a moist Ray-Breana.    Utilizing the Neoprobe, we were able to find the area of a hot node in the right axilla. Utilizing local anesthetic this area was infiltrated.  Utilizing a #15 blade scalpel, an incision was then made.  Electrocautery Bovie was then deepened down into the tissue of the axilla.  Utilizing the Neoprobe as a guide, we were able to find the area of the hot node.  The hot node was then grasped with [place] and completely excised utilizing the electrocautery Bovie.  Once excised, it was placed on the Neoprobe and the count was approximately 7600.  This was then sent to pathology in formalin.  Next, utilizing the Neoprobe, we were able to find another area of the hot node approximately 10% to the last one.  This area was palpable.  Therefore, I was able to grasp it with a DeBakey elevated, grasped with a Jenna and excised it utilizing the electrocautery Bovie.  This was sent to pathology in formalin labeled as left sentinel lymph node #3.  At this point, I was not able to get any additional detection from the Neoprobe.  However the very first note that I found was very blue however did not yield any uptake from the technetium. Therefore, we noted that hemostasis was achieved.  The  incision was then sewn with a 3-0 Monocryl in a simple interrupted buried fashion and the skin was then run with 4-0 Monocryl in a subcuticular running fashion.  Next, we then focused on the breast incision; noted that hemostasis was achieved.  We then sewed the incision closed with a 3-0 Monocryl in a simple interrupted buried fashion and ran the skin with a 4-0 Monocryl in a running fashion.  The entire breast and axilla was clean and dried and exofin were then applied to both incisions.  Fluff dressing was then used on the breast and a ACE wrap was then placed.  The patient tolerated the procedure well.  All counts were correct x2 prior to closing of both incisions.     FINDINGS:  3 sentinel lymph nodes were excised.  The breast mass specimen noted to have both the clip and the wire.  We will await final pathology.           [facility] JEANNIE DO

## 2021-07-22 NOTE — ANESTHESIA PREPROCEDURE EVALUATION
Anesthesia Pre-Procedure Evaluation    Patient: Roxie Woods   MRN: [id number] : 1955        Preoperative Diagnosis: Malignant neoplasm of upper-inner quadrant of right breast in female, estrogen receptor positive (H) [C50.211, Z17.0]   Procedure : Procedure(s):  Right wire localized partial mastectomy with right sentinel lymph node biopsy with possible axillary dissection     Past Medical History:   Diagnosis Date     DUB (dysfunctional uterine bleeding)      Essential hypertension      Menopausal disorder       Past Surgical History:   Procedure Laterality Date     CARPAL TUNNEL RELEASE RT/LT  99    RT     ZZC NONSPECIFIC PROCEDURE      Toenails      Allergies   Allergen Reactions     Zoloft Rash      Social History     Tobacco Use     Smoking status: Former Smoker     Packs/day: 1.00     Years: 20.00     Pack years: 20.00     Types: Cigarettes     Smokeless tobacco: Never Used   Substance Use Topics     Alcohol use: Yes      Wt Readings from Last 1 Encounters:   21 107.3 kg (236 lb 8 oz)        Anesthesia Evaluation   Pt has had prior anesthetic. Type: MAC.    No history of anesthetic complications       ROS/MED HX  ENT/Pulmonary:     (+) tobacco use, Past use,     Neurologic:  - neg neurologic ROS     Cardiovascular:     (+) Dyslipidemia hypertension-----No previous cardiac testing     METS/Exercise Tolerance:     Hematologic:  - neg hematologic  ROS     Musculoskeletal:  - neg musculoskeletal ROS     GI/Hepatic:  - neg GI/hepatic ROS  (-) GERD   Renal/Genitourinary:  - neg Renal ROS     Endo:     (+) Obesity,     Psychiatric/Substance Use:  - neg psychiatric ROS     Infectious Disease:  - neg infectious disease ROS     Malignancy: Comment: Having a breast mass removed today      Other:  - neg other ROS          Physical Exam    Airway        Mallampati: II   TM distance: > 3 FB   Neck ROM: full   Mouth opening: > 3 cm    Respiratory Devices and Support         Dental  no notable dental  history         Cardiovascular   cardiovascular exam normal       Rhythm and rate: regular and normal     Pulmonary   pulmonary exam normal        breath sounds clear to auscultation           OUTSIDE LABS:  CBC:   Lab Results   Component Value Date    WBC 8.4 06/23/2021    WBC 6.8 03/28/2016    HGB 13.6 06/23/2021    HGB 14.6 03/28/2016    HCT 42.9 06/23/2021    HCT 45.0 03/28/2016     06/23/2021     03/28/2016     BMP:   Lab Results   Component Value Date     06/23/2021     04/23/2019    POTASSIUM 4.5 06/23/2021    POTASSIUM 4.5 04/23/2019    CHLORIDE 102 06/23/2021    CHLORIDE 104 04/23/2019    CO2 30 06/23/2021    CO2 26 04/23/2019    BUN 18 06/23/2021    BUN 15 04/23/2019    CR 0.80 06/23/2021    CR 0.60 04/23/2019     (H) 06/23/2021    GLC 91 04/23/2019     COAGS: No results found for: PTT, INR, FIBR  POC: No results found for: BGM, HCG, HCGS  HEPATIC:   Lab Results   Component Value Date    ALBUMIN 3.8 03/28/2016    PROTTOTAL 7.8 03/28/2016    ALT 43 06/23/2021    AST 30 06/23/2021    ALKPHOS 115 03/28/2016    BILITOTAL 0.4 03/28/2016     OTHER:   Lab Results   Component Value Date    A1C 5.7 (H) 06/23/2021    JAIDEN 8.9 06/23/2021       Anesthesia Plan    ASA Status:  2   NPO Status:  NPO Appropriate    Anesthesia Type: General.     - Airway: LMA   Induction: Intravenous, Propofol.   Maintenance: Balanced.        Consents    Anesthesia Plan(s) and associated risks, benefits, and realistic alternatives discussed. Questions answered and patient/representative(s) expressed understanding.     - Discussed with:  Patient      - Extended Intubation/Ventilatory Support Discussed: No.      - Patient is DNR/DNI Status: No    Use of blood products discussed: Yes.     - Discussed with: Patient.     - Consented: consented to blood products            Reason for refusal: other.     Postoperative Care    Pain management: IV analgesics, Oral pain medications.     - Plan for long acting post-op  opioid use   PONV prophylaxis: Ondansetron (or other 5HT-3), Meclizine or Dimenhydrinate     Comments:    The risks and benefits of anesthesia, and the alternatives where applicable, have been discussed with the patient, and they wish to proceed.            BON Alejandro CRNA

## 2021-07-22 NOTE — ANESTHESIA POSTPROCEDURE EVALUATION
Patient: Roxie Woods    Procedure(s):  Right wire localized partial mastectomy with right sentinel lymph node biopsy  Excise lesion left neck    Diagnosis:Malignant neoplasm of upper-inner quadrant of right breast in female, estrogen receptor positive (H) [C50.211, Z17.0]  Diagnosis Additional Information: No value filed.    Anesthesia Type:  General    Note:  Disposition: Outpatient   Postop Pain Control: Uneventful            Sign Out: Well controlled pain   PONV: No   Neuro/Psych: Uneventful            Sign Out: Acceptable/Baseline neuro status   Airway/Respiratory: Uneventful            Sign Out: Acceptable/Baseline resp. status   CV/Hemodynamics: Uneventful            Sign Out: Acceptable CV status   Other NRE: NONE   DID A NON-ROUTINE EVENT OCCUR? No    Event details/Postop Comments:  Pt was happy with anesthesia care.  No complications.  I will follow up with the pt if needed.           Last vitals:  Vitals Value Taken Time   /79 07/22/21 1200   Temp 97.5  F (36.4  C) 07/22/21 1200   Pulse 95 07/22/21 1200   Resp 13 07/22/21 1200   SpO2 94 % 07/22/21 1200       Electronically Signed By: BON Alejandro CRNA  July 22, 2021  2:18 PM

## 2021-07-22 NOTE — ANESTHESIA CARE TRANSFER NOTE
Patient: Roxie Woods    Procedure(s):  Right wire localized partial mastectomy with right sentinel lymph node biopsy  Excise lesion left neck    Diagnosis: Malignant neoplasm of upper-inner quadrant of right breast in female, estrogen receptor positive (H) [C50.211, Z17.0]  Diagnosis Additional Information: No value filed.    Anesthesia Type:   General     Note:    Oropharynx: oropharynx clear of all foreign objects and spontaneously breathing  Level of Consciousness: drowsy  Oxygen Supplementation: face mask    Independent Airway: airway patency satisfactory and stable  Dentition: dentition unchanged  Vital Signs Stable: post-procedure vital signs reviewed and stable  Report to RN Given: handoff report given  Patient transferred to: PACU    Handoff Report: Identifed the Patient, Identified the Reponsible Provider, Reviewed the pertinent medical history, Discussed the surgical course, Reviewed Intra-OP anesthesia mangement and issues during anesthesia, Set expectations for post-procedure period and Allowed opportunity for questions and acknowledgement of understanding      Vitals:  Vitals Value Taken Time   /80 07/22/21 1145   Temp     Pulse 96 07/22/21 1147   Resp 10 07/22/21 1147   SpO2 96 % 07/22/21 1147   Vitals shown include unvalidated device data.    Electronically Signed By: BON Alejandro CRNA  July 22, 2021  11:48 AM

## 2021-07-27 LAB
PATH REPORT.COMMENTS IMP SPEC: NORMAL
PATH REPORT.COMMENTS IMP SPEC: NORMAL
PATH REPORT.FINAL DX SPEC: NORMAL
PATH REPORT.GROSS SPEC: NORMAL
PATH REPORT.MICROSCOPIC SPEC OTHER STN: NORMAL
PATH REPORT.RELEVANT HX SPEC: NORMAL
PATHOLOGY SYNOPTIC REPORT: NORMAL
PHOTO IMAGE: NORMAL

## 2021-07-28 ENCOUNTER — OFFICE VISIT (OUTPATIENT)
Dept: SURGERY | Facility: CLINIC | Age: 66
End: 2021-07-28
Payer: COMMERCIAL

## 2021-07-28 VITALS
TEMPERATURE: 96.9 F | BODY MASS INDEX: 41.46 KG/M2 | HEIGHT: 63 IN | WEIGHT: 234 LBS | DIASTOLIC BLOOD PRESSURE: 74 MMHG | SYSTOLIC BLOOD PRESSURE: 120 MMHG

## 2021-07-28 DIAGNOSIS — E66.01 MORBID OBESITY WITH BMI OF 40.0-44.9, ADULT (H): ICD-10-CM

## 2021-07-28 DIAGNOSIS — I10 HYPERTENSION GOAL BP (BLOOD PRESSURE) < 140/90: ICD-10-CM

## 2021-07-28 DIAGNOSIS — Z90.11 S/P PARTIAL MASTECTOMY, RIGHT: Primary | ICD-10-CM

## 2021-07-28 DIAGNOSIS — Z80.3 FAMILY HISTORY OF BREAST CANCER IN MOTHER: ICD-10-CM

## 2021-07-28 DIAGNOSIS — C50.211 MALIGNANT NEOPLASM OF UPPER-INNER QUADRANT OF RIGHT BREAST IN FEMALE, ESTROGEN RECEPTOR POSITIVE (H): ICD-10-CM

## 2021-07-28 DIAGNOSIS — R73.03 PRE-DIABETES: ICD-10-CM

## 2021-07-28 DIAGNOSIS — Z17.0 MALIGNANT NEOPLASM OF UPPER-INNER QUADRANT OF RIGHT BREAST IN FEMALE, ESTROGEN RECEPTOR POSITIVE (H): ICD-10-CM

## 2021-07-28 PROCEDURE — 99024 POSTOP FOLLOW-UP VISIT: CPT | Performed by: SURGERY

## 2021-07-28 ASSESSMENT — MIFFLIN-ST. JEOR: SCORE: 1567.61

## 2021-07-28 NOTE — LETTER
7/28/2021         RE: Roxie Woods  [address] [facility] [address]        Dear Colleague,    Thank you for referring your patient, Roxie Woods, to the [facility]. Please see a copy of my visit note below.        Assessment & Plan   Problem List Items Addressed This Visit        Digestive    Morbid obesity with BMI of 40.0-44.9, adult (H)       Endocrine    Pre-diabetes       Circulatory    Hypertension goal BP (blood pressure) < 140/90       Other    Malignant neoplasm of upper-inner quadrant of right breast in female, estrogen receptor positive (H)    Relevant Orders    Cancer Risk Mgmt/Cancer Genetic Counseling Referral      Other Visit Diagnoses     S/P partial mastectomy, right    -  Primary    Relevant Orders    Cancer Risk Mgmt/Cancer Genetic Counseling Referral    Family history of breast cancer in mother        mother and sister         No further surgery is indicated.  We reviewed the role of Oncotype DX in decision-making for adjuvant chemotherapy; we will start this process.  A referral will be made to Medical Oncology to further discuss.  We reviewed that she will also need adjuvant radiation therapy to complete breast conservation therapy; a referral to Radiation Oncology will be made.  Pt would like to see genetic counseling has her sister recently got diagnosed with triple negative breast cancer. I will see her in 6 months' time.        No follow-ups on file.    Agustín Allan MD  [facility]    Subjective   Roxie is a 66 year old who presents for the following health issues:    right breast - IDC, 2/3; DCIS+; ER/AR (+); HER2 (-) 1 oclock 6cm FNAC; 1.2 in greatest cm on US; Factor V lieden - no blood thinner    wire local partial mastectomy on 7/22/2021 - T1cN0    No issues;  NO pain.  No signs of infection        Review of Systems   {ROS COMP (Optional):140540}      Objective    /74   Temp 96.9  F (36.1  C) (Temporal)  "  Ht 1.588 m (5' 2.5\")   Wt 106.1 kg (234 lb)   BMI 42.12 kg/m    Body mass index is 42.12 kg/m .  Physical Exam   Right breast - incisions CDI; minimal seroma; no ecchymosis.  So signs of infection          Again, thank you for allowing me to participate in the care of your patient.        Sincerely,        Agustín Allan MD    "

## 2021-07-28 NOTE — PROGRESS NOTES
"    Assessment & Plan   Problem List Items Addressed This Visit        Digestive    Morbid obesity with BMI of 40.0-44.9, adult (H)       Endocrine    Pre-diabetes       Circulatory    Hypertension goal BP (blood pressure) < 140/90       Other    Malignant neoplasm of upper-inner quadrant of right breast in female, estrogen receptor positive (H)    Relevant Orders    Cancer Risk Mgmt/Cancer Genetic Counseling Referral      Other Visit Diagnoses     S/P partial mastectomy, right    -  Primary    Relevant Orders    Cancer Risk Mgmt/Cancer Genetic Counseling Referral    Family history of breast cancer in mother        mother and sister         No further surgery is indicated.  We reviewed the role of Oncotype DX in decision-making for adjuvant chemotherapy; we will start this process.  A referral will be made to Medical Oncology to further discuss.  We reviewed that she will also need adjuvant radiation therapy to complete breast conservation therapy; a referral to Radiation Oncology will be made.  Pt would like to see genetic counseling has her sister recently got diagnosed with triple negative breast cancer. I will see her in 6 months' time.        No follow-ups on file.    JustenNils Allan MD  [facility]    Liliana Faustin is a 66 year old who presents for the following health issues:    right breast - IDC, 2/3; DCIS+; ER/NH (+); HER2 (-) 1 oclock 6cm FNAC; 1.2 in greatest cm on US; Factor V lieden - no blood thinner    wire local partial mastectomy on 7/22/2021 - T1cN0    No issues;  NO pain.  No signs of infection        Review of Systems         Objective    /74   Temp 96.9  F (36.1  C) (Temporal)   Ht 1.588 m (5' 2.5\")   Wt 106.1 kg (234 lb)   BMI 42.12 kg/m    Body mass index is 42.12 kg/m .  Physical Exam   Right breast - incisions CDI; minimal seroma; no ecchymosis.  So signs of infection      "

## 2021-07-29 ENCOUNTER — TELEPHONE (OUTPATIENT)
Dept: SURGERY | Facility: CLINIC | Age: 66
End: 2021-07-29

## 2021-07-29 NOTE — TELEPHONE ENCOUNTER
Reason for Call:  Other call back    Detailed comments: Pt had post op with Allan yesterday 7/28 and forgot to ask how long she needs to wear her bra 24/7. Pt would appreciate a call back    Phone Number Patient can be reached at: Cell number on file:    Telephone Information:   Mobile [phone]       Best Time: any    Can we leave a detailed message on this number? YES    Call taken on 7/29/2021 at 8:43 AM by Elena Rivera

## 2021-07-30 ENCOUNTER — DOCUMENTATION ONLY (OUTPATIENT)
Dept: ONCOLOGY | Facility: CLINIC | Age: 66
End: 2021-07-30

## 2021-07-30 ENCOUNTER — OFFICE VISIT (OUTPATIENT)
Dept: URGENT CARE | Facility: URGENT CARE | Age: 66
End: 2021-07-30
Payer: COMMERCIAL

## 2021-07-30 VITALS
SYSTOLIC BLOOD PRESSURE: 139 MMHG | OXYGEN SATURATION: 99 % | HEART RATE: 94 BPM | DIASTOLIC BLOOD PRESSURE: 65 MMHG | TEMPERATURE: 99 F

## 2021-07-30 DIAGNOSIS — Z48.02 VISIT FOR SUTURE REMOVAL: Primary | ICD-10-CM

## 2021-07-30 PROCEDURE — 99207 PR BUNDLED PROCEDURE IN GLOBAL PKG: CPT | Performed by: PHYSICIAN ASSISTANT

## 2021-07-30 NOTE — PROGRESS NOTES
Oncotype ordered per request of Dr. Allan.  Email received for confirmation 7/30/21.    Joel Arroyo RN, BSN, OCN  7/30/2021, 11:19 AM

## 2021-07-30 NOTE — PROGRESS NOTES
Assessment & Plan     1. Visit for suture removal        {Provider  Link to [facility] Help Grid :067323}      Madhu Gore PA-C  [facility] [place]    Subjective     Roxie is a 66 year old female who presents to clinic today for the following health issues:  Chief Complaint   Patient presents with     Suture Removal     left shoulder suture removal --sutures placed 07/22     HPI    Surgical procedure on 7/22 for partial mastectomy. She says it went well. Was given suture removal kit for home but  was unable to use to remove the 3 sutures.       3 sutures removed without incident. No bleeding with good continued opposition of skin. No discharge or erythema to vertical wound.         Review of Systems        Objective    /65   Pulse 94   Temp 99  F (37.2  C) (Tympanic)   SpO2 99%   Physical Exam

## 2021-09-03 ENCOUNTER — PRE VISIT (OUTPATIENT)
Dept: ONCOLOGY | Facility: CLINIC | Age: 66
End: 2021-09-03

## 2021-09-03 ENCOUNTER — TELEPHONE (OUTPATIENT)
Facility: CLINIC | Age: 66
End: 2021-09-03

## 2021-09-03 ENCOUNTER — ONCOLOGY VISIT (OUTPATIENT)
Dept: ONCOLOGY | Facility: CLINIC | Age: 66
End: 2021-09-03
Payer: COMMERCIAL

## 2021-09-03 VITALS
RESPIRATION RATE: 16 BRPM | BODY MASS INDEX: 42.28 KG/M2 | DIASTOLIC BLOOD PRESSURE: 81 MMHG | OXYGEN SATURATION: 97 % | HEART RATE: 95 BPM | WEIGHT: 238.6 LBS | HEIGHT: 63 IN | SYSTOLIC BLOOD PRESSURE: 125 MMHG | TEMPERATURE: 98.5 F

## 2021-09-03 DIAGNOSIS — Z17.0 MALIGNANT NEOPLASM OF UPPER-INNER QUADRANT OF RIGHT BREAST IN FEMALE, ESTROGEN RECEPTOR POSITIVE (H): Primary | ICD-10-CM

## 2021-09-03 DIAGNOSIS — E28.39 ESTROGEN DEFICIENCY: ICD-10-CM

## 2021-09-03 DIAGNOSIS — C50.211 MALIGNANT NEOPLASM OF UPPER-INNER QUADRANT OF RIGHT BREAST IN FEMALE, ESTROGEN RECEPTOR POSITIVE (H): Primary | ICD-10-CM

## 2021-09-03 PROCEDURE — 99204 OFFICE O/P NEW MOD 45 MIN: CPT | Performed by: INTERNAL MEDICINE

## 2021-09-03 RX ORDER — ANASTROZOLE 1 MG/1
1 TABLET ORAL DAILY
Qty: 90 TABLET | Refills: 3 | Status: SHIPPED | OUTPATIENT
Start: 2021-09-03 | End: 2022-12-09

## 2021-09-03 ASSESSMENT — MIFFLIN-ST. JEOR: SCORE: 1588.47

## 2021-09-03 ASSESSMENT — PAIN SCALES - GENERAL: PAINLEVEL: NO PAIN (0)

## 2021-09-03 NOTE — LETTER
9/3/2021         RE: Roxie Woods  [address] [facility] [address]        Dear Colleague,    Thank you for referring your patient, Roxie Woods, to the [facility]. Please see a copy of my visit note below.    [facility] PHYS[facility]  MEDICAL ONCOLOGY    NEW PATIENT VISIT NOTE    Reason for consultation: breast cancer    Referring Provider: Dr Allan    Oncology Treatment Summary  1.  6/24/2021 screening mammogram done an asymmetry with architectural distortion at the 12 o'clock position of the right breast.  Subsequent ultrasound was performed 6/30/2021 which found a 1.2 x 1.1 x 1.2 cm abnormality.  Biopsy was done which found an invasive ductal cancer grade 2 estrogen receptor 100% positive progesterone receptor 80% positive HER-2/shari negative via DONNIE.  2.  On 7/22/2021 she underwent a right lumpectomy for a 1.5 cm grade 2 invasive ductal cancer.  Margins were negative.  2 lymph nodes were removed both of which were negative.  3.  Oncotype was sent off which showed a recurrence score of 11     HISTORY OF PRESENTING ILLNESS  Patient is a very pleasant 66-year-old female who presents today for medical oncology consultation.  She is alone.  Her history is as above and she is here to discuss the results.  She is doing well and has no systemic complaints     PAST MEDICAL HISTORY  Hypertension, carpal tunnel surgery, factor V Leiden heterozygous mutation but no history of blood clots      CURRENT OUTPATIENT MEDICATIONS  Current Outpatient Medications   Medication     anastrozole (ARIMIDEX) 1 MG tablet     hydrochlorothiazide (HYDRODIURIL) 25 MG tablet     lisinopril (ZESTRIL) 10 MG tablet     simvastatin (ZOCOR) 20 MG tablet     venlafaxine (EFFEXOR) 25 MG tablet     oxyCODONE (ROXICODONE) 5 MG tablet     No current facility-administered medications for this visit.        ALLERGIES     Allergies   Allergen Reactions     Zoloft Rash        SOCIAL  HISTORY  She is  and lives with her  Flynn in [place], no tobacco use although she does smoke 1 pack a day for many years and quit this 1 year ago with the nicotine patch.  No excessive alcohol use she works part-time     FAMILY HISTORY  Mother had breast cancer in her 70s Father had prostate cancer and colon cancer in his 70s and sister was recently diagnosed with a triple negative breast cancer at 68     REVIEW OF SYSTEMS  Review Of Systems  Skin: negative  Eyes: negative  Ears/Nose/Throat: negative  Respiratory: No shortness of breath, dyspnea on exertion, cough, or hemoptysis  Cardiovascular: negative  Gastrointestinal: negative  Genitourinary: negative  Musculoskeletal: negative  Neurologic: negative  Psychiatric: negative  Hematologic/Lymphatic/Immunologic: negative  Endocrine: negative      PHYSICAL EXAM  B/P: 125/81, T: 98.5, P: 95, R: 16  Wt Readings from Last 3 Encounters:   09/03/21 108.2 kg (238 lb 9.6 oz)   07/28/21 106.1 kg (234 lb)   07/19/21 107.3 kg (236 lb 8 oz)       ECOG PPS0    Physical Exam  Vitals reviewed.   Constitutional:       Appearance: Normal appearance. She is normal weight.   HENT:      Head: Normocephalic and atraumatic.   Eyes:      Extraocular Movements: Extraocular movements intact.      Conjunctiva/sclera: Conjunctivae normal.      Pupils: Pupils are equal, round, and reactive to light.   Pulmonary:      Effort: Pulmonary effort is normal.   Neurological:      General: No focal deficit present.      Mental Status: She is alert and oriented to person, place, and time. Mental status is at baseline.   Psychiatric:         Mood and Affect: Mood normal.         Behavior: Behavior normal.         Thought Content: Thought content normal.         Judgment: Judgment normal.              LABORATORY AND IMAGING STUDIES  Recent Labs   Lab Test 06/23/21  0949 04/23/19  1135 04/24/18  1136 04/25/17  1137 03/28/16  1208    137 137 139 138   POTASSIUM 4.5 4.5 4.2 4.1 4.0    CHLORIDE 102 104 102 102 103   CO2 30 26 25 28 28   ANIONGAP 2* 7 10 9 7   BUN 18 15 14 13 14   CR 0.80 0.60 0.66 0.65 0.62   * 91 98 101* 94   JAIDEN 8.9 9.7 9.3 9.2 8.7     No results for input(s): MAG, PHOS in the last 99797 hours.  Recent Labs   Lab Test 06/23/21  0949 03/28/16  1208   WBC 8.4 6.8   HGB 13.6 14.6    278   MCV 98 98     Recent Labs   Lab Test 06/23/21  0949 03/28/16  1208 05/05/15  1258   BILITOTAL  --  0.4 0.4   ALKPHOS  --  115 124   ALT 43 22 25   AST 30 19 18   ALBUMIN  --  3.8 3.8     No results found for: TSH  No results for input(s): CEA in the last 25830 hours.  Results for orders placed or performed during the hospital encounter of 07/22/21   MA Post Procedure Right    Narrative    US BREAST WIRE PLACEMENT 1ST LESION RIGHT, MA POST PROCEDURE RIGHT;  7/22/2021 9:22 AM    HISTORY: Patient is undergoing lumpectomy.    FINDINGS: Following a discussion of risks and benefits, verbal and  written consent were obtained.  The marking clip placed at the time of  recent biopsy was targeted.  Using continuous imaging guidance, the  needle wire was advanced to the marking clip.  The needle was  subsequently withdrawn and adequate placement of the wire was  confirmed with two orthogonal mammographic views.  The films were  annotated and sent to surgery along with the patient.  There were no  complications.      Impression    IMPRESSION:  Successful placement of a wire for guided lumpectomy.      COLTON MARIE MD         SYSTEM ID:  [id number]        ASSESSMENT  AND RECOMMENDATIONS:    1.  T1 cN0 M0 invasive ductal cancer of the right breast status post lumpectomy % positive GA 80% positive HER-2/shari negative via FISH with an Oncotype recurrence score of 11    Plan    I discussed her diagnosis prognosis and options for therapy.  We reviewed she has an early stage I ER/GA positive HER-2/shari negative breast cancer and as such in general her prognosis is quite good.  We discussed  both local and systemic therapy for breast cancer and have reviewed local therapy is either a mastectomy or lumpectomy with clear margins.  We discussed the 2 of these in equivalent and overall survival however postlumpectomy and radiation there is a risk of ipsilateral breast cancer recurrence and if this were to occur 1 would then have to have a mastectomy.  That she is aware of this.    We then discussed the role for systemic therapy which she has somewhat discussed with the surgeon already.  We discussed the role of Oncotype testing and this was done which showed an Oncotype recurrence score of 11.  I reviewed with her that this is a low risk breast cancer and as such there is no statistical benefit to the use of adjuvant chemotherapy in her breast cancer such as this.  I would recommend she go on to adjuvant radiation and at the end of radiation go on to antiestrogen therapy.  We discussed tamoxifen and the aromatase inhibitors and I reviewed the differences between these.  Given her factor V Leiden mutation I do not feel that tamoxifen is a great option for her and think that Arimidex would be a better option.  We discussed this at some length I have given her prescription for Arimidex and she was instructed to start it 2 weeks after radiation is complete.  Lastly she has not had a recent bone density scan and I have placed an order to have one done which she will do.  She will then see me approximately 3 to 4 months from now when she has been on her Arimidex for some period of time.  All of her questions were answered    Nancy Colón MD on 9/3/2021 at 5:22 PM          Again, thank you for allowing me to participate in the care of your patient.        Sincerely,        Nancy Colón MD

## 2021-09-03 NOTE — PROGRESS NOTES
[facility] PHYSICIANS  MEDICAL ONCOLOGY    NEW PATIENT VISIT NOTE    Reason for consultation: breast cancer    Referring Provider: Dr Allan    Oncology Treatment Summary  1.  6/24/2021 screening mammogram done an asymmetry with architectural distortion at the 12 o'clock position of the right breast.  Subsequent ultrasound was performed 6/30/2021 which found a 1.2 x 1.1 x 1.2 cm abnormality.  Biopsy was done which found an invasive ductal cancer grade 2 estrogen receptor 100% positive progesterone receptor 80% positive HER-2/shari negative via DONNIE.  2.  On 7/22/2021 she underwent a right lumpectomy for a 1.5 cm grade 2 invasive ductal cancer.  Margins were negative.  2 lymph nodes were removed both of which were negative.  3.  Oncotype was sent off which showed a recurrence score of 11     HISTORY OF PRESENTING ILLNESS  Patient is a very pleasant 66-year-old female who presents today for medical oncology consultation.  She is alone.  Her history is as above and she is here to discuss the results.  She is doing well and has no systemic complaints     PAST MEDICAL HISTORY  Hypertension, carpal tunnel surgery, factor V Leiden heterozygous mutation but no history of blood clots      CURRENT OUTPATIENT MEDICATIONS  Current Outpatient Medications   Medication     anastrozole (ARIMIDEX) 1 MG tablet     hydrochlorothiazide (HYDRODIURIL) 25 MG tablet     lisinopril (ZESTRIL) 10 MG tablet     simvastatin (ZOCOR) 20 MG tablet     venlafaxine (EFFEXOR) 25 MG tablet     oxyCODONE (ROXICODONE) 5 MG tablet     No current facility-administered medications for this visit.        ALLERGIES     Allergies   Allergen Reactions     Zoloft Rash        SOCIAL HISTORY  She is  and lives with her  Flynn in [place], no tobacco use although she does smoke 1 pack a day for many years and quit this 1 year ago with the nicotine patch.  No excessive alcohol use she works part-time     FAMILY HISTORY  Mother had breast cancer  in her 70s Father had prostate cancer and colon cancer in his 70s and sister was recently diagnosed with a triple negative breast cancer at 68     REVIEW OF SYSTEMS  Review Of Systems  Skin: negative  Eyes: negative  Ears/Nose/Throat: negative  Respiratory: No shortness of breath, dyspnea on exertion, cough, or hemoptysis  Cardiovascular: negative  Gastrointestinal: negative  Genitourinary: negative  Musculoskeletal: negative  Neurologic: negative  Psychiatric: negative  Hematologic/Lymphatic/Immunologic: negative  Endocrine: negative      PHYSICAL EXAM  B/P: 125/81, T: 98.5, P: 95, R: 16  Wt Readings from Last 3 Encounters:   09/03/21 108.2 kg (238 lb 9.6 oz)   07/28/21 106.1 kg (234 lb)   07/19/21 107.3 kg (236 lb 8 oz)       ECOG PPS0    Physical Exam  Vitals reviewed.   Constitutional:       Appearance: Normal appearance. She is normal weight.   HENT:      Head: Normocephalic and atraumatic.   Eyes:      Extraocular Movements: Extraocular movements intact.      Conjunctiva/sclera: Conjunctivae normal.      Pupils: Pupils are equal, round, and reactive to light.   Pulmonary:      Effort: Pulmonary effort is normal.   Neurological:      General: No focal deficit present.      Mental Status: She is alert and oriented to person, place, and time. Mental status is at baseline.   Psychiatric:         Mood and Affect: Mood normal.         Behavior: Behavior normal.         Thought Content: Thought content normal.         Judgment: Judgment normal.              LABORATORY AND IMAGING STUDIES  Recent Labs   Lab Test 06/23/21  0949 04/23/19  1135 04/24/18  1136 04/25/17  1137 03/28/16  1208    137 137 139 138   POTASSIUM 4.5 4.5 4.2 4.1 4.0   CHLORIDE 102 104 102 102 103   CO2 30 26 25 28 28   ANIONGAP 2* 7 10 9 7   BUN 18 15 14 13 14   CR 0.80 0.60 0.66 0.65 0.62   * 91 98 101* 94   JAIDEN 8.9 9.7 9.3 9.2 8.7     No results for input(s): MAG, PHOS in the last 70401 hours.  Recent Labs   Lab Test 06/23/21  0949  03/28/16  1208   WBC 8.4 6.8   HGB 13.6 14.6    278   MCV 98 98     Recent Labs   Lab Test 06/23/21  0949 03/28/16  1208 05/05/15  1258   BILITOTAL  --  0.4 0.4   ALKPHOS  --  115 124   ALT 43 22 25   AST 30 19 18   ALBUMIN  --  3.8 3.8     No results found for: TSH  No results for input(s): CEA in the last 86168 hours.  Results for orders placed or performed during the hospital encounter of 07/22/21   MA Post Procedure Right    Narrative    US BREAST WIRE PLACEMENT 1ST LESION RIGHT, MA POST PROCEDURE RIGHT;  7/22/2021 9:22 AM    HISTORY: Patient is undergoing lumpectomy.    FINDINGS: Following a discussion of risks and benefits, verbal and  written consent were obtained.  The marking clip placed at the time of  recent biopsy was targeted.  Using continuous imaging guidance, the  needle wire was advanced to the marking clip.  The needle was  subsequently withdrawn and adequate placement of the wire was  confirmed with two orthogonal mammographic views.  The films were  annotated and sent to surgery along with the patient.  There were no  complications.      Impression    IMPRESSION:  Successful placement of a wire for guided lumpectomy.      COLTON MARIE MD         SYSTEM ID:  [id number]        ASSESSMENT  AND RECOMMENDATIONS:    1.  T1 cN0 M0 invasive ductal cancer of the right breast status post lumpectomy % positive GA 80% positive HER-2/shari negative via FISH with an Oncotype recurrence score of 11    Plan    I discussed her diagnosis prognosis and options for therapy.  We reviewed she has an early stage I ER/GA positive HER-2/shari negative breast cancer and as such in general her prognosis is quite good.  We discussed both local and systemic therapy for breast cancer and have reviewed local therapy is either a mastectomy or lumpectomy with clear margins.  We discussed the 2 of these in equivalent and overall survival however postlumpectomy and radiation there is a risk of ipsilateral breast  cancer recurrence and if this were to occur 1 would then have to have a mastectomy.  That she is aware of this.    We then discussed the role for systemic therapy which she has somewhat discussed with the surgeon already.  We discussed the role of Oncotype testing and this was done which showed an Oncotype recurrence score of 11.  I reviewed with her that this is a low risk breast cancer and as such there is no statistical benefit to the use of adjuvant chemotherapy in her breast cancer such as this.  I would recommend she go on to adjuvant radiation and at the end of radiation go on to antiestrogen therapy.  We discussed tamoxifen and the aromatase inhibitors and I reviewed the differences between these.  Given her factor V Leiden mutation I do not feel that tamoxifen is a great option for her and think that Arimidex would be a better option.  We discussed this at some length I have given her prescription for Arimidex and she was instructed to start it 2 weeks after radiation is complete.  Lastly she has not had a recent bone density scan and I have placed an order to have one done which she will do.  She will then see me approximately 3 to 4 months from now when she has been on her Arimidex for some period of time.  All of her questions were answered    Nancy Colón MD on 9/3/2021 at 5:22 PM

## 2021-09-03 NOTE — NURSING NOTE
"Oncology Rooming Note    September 3, 2021 11:03 AM   Roxie Woods is a 66 year old female who presents for:    Chief Complaint   Patient presents with     Oncology Clinic Visit     New patient     Initial Vitals: /81 (BP Location: Left arm, Patient Position: Sitting, Cuff Size: Adult Large)   Pulse 95   Temp 98.5  F (36.9  C) (Oral)   Resp 16   Ht 1.588 m (5' 2.5\")   Wt 108.2 kg (238 lb 9.6 oz)   SpO2 97%   BMI 42.95 kg/m   Estimated body mass index is 42.95 kg/m  as calculated from the following:    Height as of this encounter: 1.588 m (5' 2.5\").    Weight as of this encounter: 108.2 kg (238 lb 9.6 oz). Body surface area is 2.18 meters squared.  No Pain (0) Comment: Data Unavailable   No LMP recorded. Patient is postmenopausal.  Allergies reviewed: Yes  Medications reviewed: Yes    Medications: Medication refills not needed today.  Pharmacy name entered into [facility]: [facility] [address] [place] [id number] [facility]    Clinical concerns: What happens during radiation and immune system Dr. Colón was notified.      Martha Gross [facility]            "

## 2021-09-03 NOTE — TELEPHONE ENCOUNTER
Writer faxed Rad Therapy referral to [facility]. Faxed to [phone].    The Nurse Care Coordinator will contact the patient next week, Tue/Wed, as she is out of office today 9/3/2021.    Patient is aware.    Jamee Barclay  [facility]  Cancer/Infusion Center   [phone]

## 2021-09-08 ENCOUNTER — PATIENT OUTREACH (OUTPATIENT)
Dept: RADIATION ONCOLOGY | Facility: CLINIC | Age: 66
End: 2021-09-08

## 2021-09-08 ENCOUNTER — VIRTUAL VISIT (OUTPATIENT)
Dept: ONCOLOGY | Facility: CLINIC | Age: 66
End: 2021-09-08
Attending: SURGERY
Payer: COMMERCIAL

## 2021-09-08 ENCOUNTER — PATIENT OUTREACH (OUTPATIENT)
Dept: ONCOLOGY | Facility: CLINIC | Age: 66
End: 2021-09-08

## 2021-09-08 DIAGNOSIS — Z90.11 S/P PARTIAL MASTECTOMY, RIGHT: ICD-10-CM

## 2021-09-08 DIAGNOSIS — Z80.3 FAMILY HISTORY OF MALIGNANT NEOPLASM OF BREAST: ICD-10-CM

## 2021-09-08 DIAGNOSIS — Z17.0 MALIGNANT NEOPLASM OF UPPER-INNER QUADRANT OF RIGHT BREAST IN FEMALE, ESTROGEN RECEPTOR POSITIVE (H): Primary | ICD-10-CM

## 2021-09-08 DIAGNOSIS — C50.211 MALIGNANT NEOPLASM OF UPPER-INNER QUADRANT OF RIGHT BREAST IN FEMALE, ESTROGEN RECEPTOR POSITIVE (H): Primary | ICD-10-CM

## 2021-09-08 PROCEDURE — 96040 HC GENETIC COUNSELING, EACH 30 MINUTES: CPT | Mod: TEL | Performed by: GENETIC COUNSELOR, MS

## 2021-09-08 NOTE — LETTER
Cancer Risk Management  Program Locations    [facility]  [facility]  [facility]  [facility]  Mailing Address  Cancer Risk Management Program  [facility]  [address]  [facility] [address]  [place] [address]    New patient appointments  [phone]  September 12, 2021    Roxie SPARROW Chuck  [address] [facility] [address]      Dear Roxie,    It was a pleasure speaking with you on the phone on 9-8-2021. Here is a copy of the progress note from our discussion. If you have any additional questions, please feel free to call.    Cancer Risk Management Program Genetic Counseling Note    9/8/2021    [facility]    Presenting Information:   I had a telephone visit with Roxie Woods today for genetic counseling through the Cancer Risk Management Program, in order to discuss her personal and family history of breast cancer.  She presents today to review this history, cancer screening recommendations, and available genetic testing options.  This consultation was done via a telephone consult due to current COVID-19 restrictions (see attestation below).    Personal History:  Roxie is a 66 year old female. In June of this year, Roxie underwent a routine screening mammogram that noted some right breast asymmetry, which prompted additional imaging that noted a suspicious area, which was then biopsied.   Pathology findings from this right breast biopsy were consistent with an invasive ductal breast cancer; the cancer cells were noted to be estrogen receptor positive, progesterone receptor positive, and her-2 negative.  Roxie underwent an lumpectomy in July and plans to start radiation therapy soon.  After radiation is completed, Roxie plans hormonal therapy with Arimidex.  Based on her Oncotype score, no chemotherapy is planned at  "this time.    In other medical history, Roxie has a diagnosis of \"pre-diabetes,\" and hypertension.  In addition, Roxie is heterozygous for Factor V Leiden; she had that testing because of Factor V Leiden being identified first in her son, but Roxie reports no personal history of adverse clotting events in herself.  In her hormone-based medical history, it is noted that Roxie had her first menstrual period at age 13.  Roxie reports that she had used a variety of hormone-based contraceptives in the past, such as oral contraceptives, Depo Provera, Norplant, and IUDs.  She had her first (only) child around age 22.  She is post-menopausal.    With respect to cancer screening, Roxie reports that she had not had any previous abnormal mammograms or breast biopsies.  Roxie had a negative colonoscopy in 2003.  Since that time, she has declined colonoscopy screening but has had stool-based colon cancer screening (FIT tests), which have all been negative; over the last several years, she has been having these stool testing approximately once a year.  Roxie had a skin lesion removed from the skin of her neck at the time of her breast surgery, but pathology studies were benign.    Roxie reports that she has a past history of smoking but quit around a year ago.  She reports no personal history of chronic, excessive alcohol use.  Roxie states that she is not aware of any other personal history of any specific, potentially concerning environmental exposures with respect to cancer risk.    Family History: (Please see scanned pedigree for detailed family history information)    As noted above, Roxie was diagnosed with breast cancer at age 65.    Roxie reports that, shortly after Roxie's own diagnosis, her sister was diagnosed at age 67 with a triple negative breast cancer.  Roxie states that her sister is planning on having genetic counseling/genetic testing later this week.    Roxie reports that their " "mother was diagnosed with breast cancer in her mid- to late-70s.  She reports that her mother had also been diagnosed with \"parathyroid disease,\" which had resulted in her having 3 of her parathyroid glands removed; Roxie states she thinks that there may have been parathyroid tumors noted.    Roxie reports that she is not aware of any other history of cancer on her mother's side of the family.    Roxie reports that her father was diagnosed with both prostate cancer (treated with surgery) and colon cancer (treated with chemotherapy) in his 70s.  She reports that he later  in his 80s for reasons unrelated to cancer.    Roxie states that one of her paternal aunts was diagnosed with breast cancer; she didn't know may details about that diagnosis, but she thought it occurred after age 50.    Roxie reports that her mother's family is of English and general  ancestry and that her father's family is of Nigerian ancestry.  There is no known Ashkenazi Caodaism ancestry on either side of her family. There is no reported consanguinity.    Discussion:    We reviewed the features of sporadic, familial, and hereditary cancers. In looking at Roxie's family history, it is possible that a cancer susceptibility gene is present due to the history of breast cancer in Roxie, her sister, and her mother.  We talked about that about 10% of breast cancers are thought to be related to a specific, single hereditary cancer gene mutation.  However, we talked about that it is thought that \"triple negative\" breast cancers (such as her sister's) are more likely to be related to an inherited mutation in a cancer susceptibility gene.      We discussed the natural history and genetics of hereditary cancer syndromes associated with breast cancer. A detailed handout regarding some of these hereditary cancer syndromes and the information we discussed was provided to Roxie after our appointment today and can be found in the after " visit summary. Topics included: inheritance pattern, cancer risks, cancer screening recommendations, and also risks, benefits and limitations of testing.      Based on her personal and family history, Roxie meets current National Comprehensive Cancer Network (NCCN) criteria for genetic testing of high-penetrance breast cancer susceptibility genes (including BRCA1, BRCA2, CDH1, PALB2, PTEN, and TP53), because of the history of breast cancer in Roxie, her sister, and her mother (and supported by the triple negative status of her sister's breast cancer).       We discussed that there are additional genes besides those listed above that could cause increased risk for breast cancer. As many of these genes present with overlapping features in a family and accurate cancer risk cannot always be established based upon the pedigree analysis alone, it would be reasonable for Roxie to consider panel genetic testing to analyze multiple genes at once.      Medical Management: For Roxie, we reviewed that the information from genetic testing may determine:    additional cancer screening for which Roxie may qualify (e.g. mammogram and breast MRI, more frequent colonoscopies, more frequent dermatologic exams, etc.),    options for risk-reducing surgeries Roxie could consider (e.g. bilateral mastectomy, surgery to remove her ovaries and/or uterus, etc.),      and targeted therapies for Roxie, if she were to develop certain cancers in the future (e.g. lumpectomy vs mastectomy, immunotherapy for individuals with Daly syndrome, PARP inhibitors for HBOC syndrome, etc.).     Plan:  1) After today's conversation, Roxie decided that she wanted to wait for her sister with triple negative breast cancer to first complete her genetic testing, before Roxie herself makes decisions about proceeding with genetic testing.    2) I encouraged Roxie to contact me after her sister receives her genetic testing results, and I can  discuss with her the potential implications of those test results for Roxie (and other family members).    Mitzi Llamas MS, [facility]  Genetic Counselor    Time spent over the phone: 50 minutes

## 2021-09-08 NOTE — TELEPHONE ENCOUNTER
Received telephone call from patient reporting that she would like to complete radiation treatment at [facility] as [facility] is under construction.   Patient recently diagnosed with right breast cancer, lumpectomy on 7/22/2021 with Dr. Allan, met with Dr. Colón in medical oncology and no plans for chemotherapy.  Patient confirms that she does not have a cardiac pacemaker and confirms no previous history of radiation therapy.  All records and imaging in [facility] medical chart.   Patient scheduled for this Friday, 9/10/2021 at 1330 with Dr. Kieran Dalal at [facility] for radiation oncology new patient consultation.  Patient aware of clinic location and check-in desk location, patient provided direct contact information for this RN should any questions or concerns arise prior to scheduled appointment.  Patient had no questions at this time.    Hortensia Duff, RN BSN OCN CBCN

## 2021-09-08 NOTE — PROGRESS NOTES
RECORDS STATUS - BREAST    RECORDS REQUESTED FROM: EPIC   DATE REQUESTED: 9/10/2021   NOTES DETAILS STATUS   OFFICE NOTE from referring provider     OFFICE NOTE from medical oncologist Complete 9/8/2021 Mastectomy     9/3/2021 Malignant Neoplasm of right breast    OFFICE NOTE from surgeon Complete See Breast Biopsy in EPIC   OFFICE NOTE from radiation oncologist     DISCHARGE SUMMARY from hospital Complete 7/22/201 Mastectomy    DISCHARGE REPORT from the ER     OPERATIVE REPORT Complete See Breast Biopsy in Epic    MEDICATION LIST Complete [facility]   CLINICAL TRIAL TREATMENTS TO DATE     LABS     REQUEST BLOCKS FOR ALL BREAST CANCER PTS     PATHOLOGY REPORTS  (Tissue diagnosis, Stage, ER/NJ percentage positive and intensity of staining, HER2 IHC, FISH, and all biopsies from breast and any distant metastasis)                  7/22/2021   B.  Breast, right, mass: Lumpectomy:  - Invasive ductal carcinoma, no special type, [place] grade 2, 1.5 cm    6/30/2021  Breast, right, 1:00, 6 cm from nipple, needle core biopsy:   - Invasive ductal carcinoma, [place] grade 2 (of 3).   - Ductal carcinoma in situ (DCIS), nuclear grade 2 (of 3), solid pattern.   - Estrogen receptor (ER) positive by immunohistochemistry.   - Progesterone receptor (NJ) positive by immunohistochemistry.   - HER2 studies are pending and will be reported separately by the   performing laboratory.    GENONOMIC TESTING     TYPE:   (Next Generation Sequencing, including Foundation One testing, and Oncotype score) Complete Her 2 [place] 6/30/2021   IMAGING (NEED IMAGES & REPORT)     CT SCANS     NM Lymph Complete 7/22/2021   MRI     MAMMO Complete MA Breast 7/22/2021 more in PACS   ULTRASOUND Complete US Breast 7/22/2021 more in PACS   PET     BONE SCAN     BRAIN MRI

## 2021-09-08 NOTE — LETTER
"    9/8/2021         RE: Roxie Woods  [address] [facility] [address]        Dear Colleague,    Thank you for referring your patient, Roxie Woods, to the [facility]. Please see a copy of my visit note below.    Cancer Risk Management Program Genetic Counseling Note    9/8/2021    Referring Provider: Dr. Randolph Allan    Presenting Information:   I had a telephone visit with Roxie Woods today for genetic counseling through the Cancer Risk Management Program, in order to discuss her personal and family history of breast cancer.  She presents today to review this history, cancer screening recommendations, and available genetic testing options.  This consultation was done via a telephone consult due to current COVID-19 restrictions (see attestation below).    Personal History:  Roxie is a 66 year old female. In June of this year, Roxie underwent a routine screening mammogram that noted some right breast asymmetry, which prompted additional imaging that noted a suspicious area, which was then biopsied.   Pathology findings from this right breast biopsy were consistent with an invasive ductal breast cancer; the cancer cells were noted to be estrogen receptor positive, progesterone receptor positive, and her-2 negative.  Roxie underwent an lumpectomy in July and plans to start radiation therapy soon.  After radiation is completed, Roxie plans hormonal therapy with Arimidex.  Based on her Oncotype score, no chemotherapy is planned at this time.    In other medical history, Roxie has a diagnosis of \"pre-diabetes,\" and hypertension.  In addition, Roxie is heterozygous for Factor V Leiden; she had that testing because of Factor V Leiden being identified first in her son, but Roxie reports no personal history of adverse clotting events in herself.  In her hormone-based medical history, it is noted that Roxie had her first menstrual period at age 13.  " "Roxie reports that she had used a variety of hormone-based contraceptives in the past, such as oral contraceptives, Depo Provera, Norplant, and IUDs.  She had her first (only) child around age 22.  She is post-menopausal.    With respect to cancer screening, Roxie reports that she had not had any previous abnormal mammograms or breast biopsies.  Roxie had a negative colonoscopy in 2003.  Since that time, she has declined colonoscopy screening but has had stool-based colon cancer screening (FIT tests), which have all been negative; over the last several years, she has been having these stool testing approximately once a year.  Roxie had a skin lesion removed from the skin of her neck at the time of her breast surgery, but pathology studies were benign.    Roxie reports that she has a past history of smoking but quit around a year ago.  She reports no personal history of chronic, excessive alcohol use.  Roxie states that she is not aware of any other personal history of any specific, potentially concerning environmental exposures with respect to cancer risk.    Family History: (Please see scanned pedigree for detailed family history information)    As noted above, Roxie was diagnosed with breast cancer at age 65.    Roxie reports that, shortly after Roxie's own diagnosis, her sister was diagnosed at age 67 with a triple negative breast cancer.  Roxie states that her sister is planning on having genetic counseling/genetic testing later this week.    Roxie reports that their mother was diagnosed with breast cancer in her mid- to late-70s.  She reports that her mother had also been diagnosed with \"parathyroid disease,\" which had resulted in her having 3 of her parathyroid glands removed; Roxie states she thinks that there may have been parathyroid tumors noted.    Roxie reports that she is not aware of any other history of cancer on her mother's side of the family.    Roxie reports that her " "father was diagnosed with both prostate cancer (treated with surgery) and colon cancer (treated with chemotherapy) in his 70s.  She reports that he later  in his 80s for reasons unrelated to cancer.    Roxie states that one of her paternal aunts was diagnosed with breast cancer; she didn't know may details about that diagnosis, but she thought it occurred after age 50.    Roxie reports that her mother's family is of English and general  ancestry and that her father's family is of Yi ancestry.  There is no known Ashkenazi Roman Catholic ancestry on either side of her family. There is no reported consanguinity.    Discussion:    We reviewed the features of sporadic, familial, and hereditary cancers. In looking at Roxie's family history, it is possible that a cancer susceptibility gene is present due to the history of breast cancer in Roxie, her sister, and her mother.  We talked about that about 10% of breast cancers are thought to be related to a specific, single hereditary cancer gene mutation.  However, we talked about that it is thought that \"triple negative\" breast cancers (such as her sister's) are more likely to be related to an inherited mutation in a cancer susceptibility gene.      We discussed the natural history and genetics of hereditary cancer syndromes associated with breast cancer. A detailed handout regarding some of these hereditary cancer syndromes and the information we discussed was provided to Roxie after our appointment today and can be found in the after visit summary. Topics included: inheritance pattern, cancer risks, cancer screening recommendations, and also risks, benefits and limitations of testing.      Based on her personal and family history, Roxie meets current National Comprehensive Cancer Network (NCCN) criteria for genetic testing of high-penetrance breast cancer susceptibility genes (including BRCA1, BRCA2, CDH1, PALB2, PTEN, and TP53), because of the history " of breast cancer in Roxie, her sister, and her mother (and supported by the triple negative status of her sister's breast cancer).       We discussed that there are additional genes besides those listed above that could cause increased risk for breast cancer. As many of these genes present with overlapping features in a family and accurate cancer risk cannot always be established based upon the pedigree analysis alone, it would be reasonable for Roxie to consider panel genetic testing to analyze multiple genes at once.      Medical Management: For Roxie, we reviewed that the information from genetic testing may determine:    additional cancer screening for which Roxie may qualify (e.g. mammogram and breast MRI, more frequent colonoscopies, more frequent dermatologic exams, etc.),    options for risk-reducing surgeries Roxie could consider (e.g. bilateral mastectomy, surgery to remove her ovaries and/or uterus, etc.),      and targeted therapies for Roxie, if she were to develop certain cancers in the future (e.g. lumpectomy vs mastectomy, immunotherapy for individuals with Daly syndrome, PARP inhibitors for HBOC syndrome, etc.).     Plan:  1) After today's conversation, Roxie decided that she wanted to wait for her sister with triple negative breast cancer to first complete her genetic testing, before Roxie herself makes decisions about proceeding with genetic testing.    2) I encouraged Roxie to contact me after her sister receives her genetic testing results, and I can discuss with her the potential implications of those test results for Roxie (and other family members).    Mitzi Llamas MS, [facility]  Genetic Counselor    Time spent over the phone: 50 minutes    Roxie is a 66 year old who is being evaluated via a billable telephone visit.      What phone number would you like to be contacted at? [phone] (work phone)  How would you like to obtain your AVS? Mail    Phone call duration: 50   minutes        Again, thank you for allowing me to participate in the care of your patient.        Sincerely,        Estefany Llamas, GC

## 2021-09-08 NOTE — PROGRESS NOTES
Patient requesting copy of Oncotype results and needed clarification on what was discussed with Dr. Colón.  Advised score was low at 11 and basically this meant that patient would not have any real benefit from chemotherapy.  Patient states she has already spoken to another provider.  She appreciated phone call.  Copy to be sent in mail.

## 2021-09-08 NOTE — LETTER
"    Cancer Risk Management  Program [facility]  [facility]  [facility]  [facility]  Mailing Address  Cancer Risk Management Program  [address]  [place] [address]    New patient appointments  [phone]  September 8, 2021    Roxie Woods  [address] [facility] [address]      Dear Roxie,    It was a pleasure speaking with you on the phone on September 8, 2021. Here is a copy of the general \"after visit summary\" from our conversation.  If you have any additional questions, please feel free to call.  You will also later receive in the mail a more detailed clinic note of our conversation as well.        Assessing Cancer Risk  Only about 5-10% of cancers are thought to be due to an inherited cancer susceptibility gene.    These families often have:    Several people with the same or related types of cancer    Cancers diagnosed at a young age (before age 50)    Individuals with more than one primary cancer    Multiple generations of the family affected with cancer    Some people may be candidates for genetic testing of more than one gene.  For these families, genetic testing using a cancer panel may be offered.  These panels will test different genes known to increase the risk for breast, ovarian, uterine, and/or other cancers. All of the genes discussed below have published clinical management guidelines for individuals who are found to carry a mutation. The purpose of this handout is to serve as a brief summary of the genes analyzed by the panels used to inquire about hereditary breast and gynecologic cancer:  LIU, BRCA1, BRCA2, BRIP1, CDH1, CHEK2, MLH1, MSH2, MSH6, PMS2, EPCAM, PTEN, PALB2, RAD51C, RAD51D, and TP53.  ______________________________________________________________________________  Hereditary " Breast and Ovarian Cancer Syndrome   (BRCA1 and BRCA2)  A single mutation in one of the copies of BRCA1 or BRCA2 increases the risk for breast and ovarian cancer, among others.  The risk for pancreatic cancer and melanoma may also be slightly increased in some families.  The chart below shows the chance that someone with a BRCA mutation would develop cancer in his or her lifetime1,2,3,4.        A person s ethnic background is also important to consider, as individuals of Ashkenazi Jehovah's witness ancestry have a higher chance of having a BRCA gene mutation.  There are three BRCA mutations that occur more frequently in this population.    Daly Syndrome   (MLH1, MSH2, MSH6, PMS2, and EPCAM)  Currently five genes are known to cause Daly Syndrome: MLH1, MSH2, MSH6, PMS2, and EPCAM.  A single mutation in one of the Daly Syndrome genes increases the risk for colon, endometrial, ovarian, and stomach cancers.  Other cancers that occur less commonly in Daly Syndrome include urinary tract, skin, and brain cancers.  The chart below shows the chance that a person with Daly syndrome would develop cancer in his or her lifetime5.      *Cancer risk varies depending on Daly syndrome gene found    Cowden Syndrome   (PTEN)  Cowden syndrome is a hereditary condition that increases the risk for breast, thyroid, endometrial, colon, and kidney cancer.  Cowden syndrome is caused by a mutation in the PTEN gene.  A single mutation in one of the copies of PTEN causes Cowden syndrome and increases cancer risk.  The chart below shows the chance that someone with a PTEN mutation would develop cancer in their lifetime6,7.  Other benign features seen in some individuals with Cowden syndrome include benign skin lesions (facial papules, keratoses, lipomas), learning disability, autism, thyroid nodules, colon polyps, and larger head size.      *One recent study found breast cancer risk to be increased to 85%    Li-Fraumeni Syndrome   (TP53)  Li-Fraumeni  Syndrome (LFS) is a cancer predisposition syndrome caused by a mutation in the TP53 gene. A single mutation in one of the copies of TP53 increases the risk for multiple cancers. Individuals with LFS are at an increased risk for developing cancer at a young age. The lifetime risk for development of a LFS-associated cancer is 50% by age 30 and 90% by age 60.   Core Cancers: Sarcomas, Breast, Brain, Lung, Leukemias/Lymphomas, Adrenocortical carcinomas  Other Cancers: Gastrointestinal, Thyroid, Skin, Genitourinary    Hereditary Diffuse Gastric Cancer   (CDH1)  Currently, one gene is known to cause hereditary diffuse gastric cancer (HDGC): CDH1.  Individuals with HDGC are at increased risk for diffuse gastric cancer and lobular breast cancer. Of people diagnosed with HDGC, 30-50% have a mutation in the CDH1 gene.  This suggests there are likely other genes that may cause HDGC that have not been identified yet.      Lifetime Cancer Risks    General Population HDGC    Diffuse Gastric  <1% ~80%   Breast 12% 39-52%         Additional Genes  LIU  LIU is a moderate-risk breast cancer gene. Women who have a mutation in LIU can have between a 2-4 fold increased risk for breast cancer compared to the general population8. LIU mutations have also been associated with increased risk for pancreatic cancer, however an estimate of this cancer risk is not well understood9. Individuals who inherit two LIU mutations have a condition called ataxia-telangiectasia (AT).  This rare autosomal recessive condition affects the nervous system and immune system, and is associated with progressive cerebellar ataxia beginning in childhood.  Individuals with ataxia-telangiectasia often have a weakened immune system and have an increased risk for childhood cancers.    PALB2  Mutations in PALB2 have been shown to increase the risk of breast cancer up to 33-58% in some families; where individuals fall within this risk range is dependent upon family  [id number]. PALB2 mutations have also been associated with increased risk for pancreatic cancer, although this risk has not been quantified yet.  Individuals who inherit two PALB2 mutations--one from their mother and one from their father--have a condition called Fanconi Anemia.  This rare autosomal recessive condition is associated with short stature, developmental delay, bone marrow failure, and increased risk for childhood cancers.    CHEK2   CHEK2 is a moderate-risk breast cancer gene.  Women who have a mutation in CHEK2 have around a 2-fold increased risk for breast cancer compared to the general population, and this risk may be higher depending upon family history.11,12,13 Mutations in CHEK2 have also been shown to increase the risk of a number of other cancers, including colon and prostate, however these cancer risks are currently not well understood.    BRIP1, RAD51C and RAD51D  Mutations in BRIP1, RAD51C, and RAD51D have been shown to increase the risk of ovarian cancer and possibly female breast cancer as well14,15 .       Lifetime Cancer Risk    General Population BRIP1 RAD51C RAD51D   Ovarian 1-2% ~5-8% ~5-9% ~7-15%           Inheritance  All of the cancer syndromes reviewed above are inherited in an autosomal dominant pattern.  This means that if a parent has a mutation, each of his or her children will have a 50% chance of inheriting that same mutation.  Therefore, each child--male or female--would have a 50% chance of being at increased risk for developing cancer.      Image obtained from Genetics Home Reference, 2013     Mutations in some genes can occur de sarah, which means that a person s mutation occurred for the first time in them and was not inherited from a parent.  Now that they have the mutation, however, it can be passed on to future generations.    Genetic Testing  Genetic testing involves a blood test and will look at the genetic information in the LIU, BRCA1, BRCA2, BRIP1, CDH1, CHEK2,  MLH1, MSH2, MSH6, PMS2, EPCAM, PTEN, PALB2, RAD51C, RAD51D, and TP53 genes for any harmful mutations that are associated with increased cancer risk.  If possible, it is recommended that the person(s) who has had cancer be tested before other family members.  That person will give us the most useful information about whether or not a specific gene is associated with the cancer in the family.    Results  There are three possible results of genetic testing:    Positive--a harmful mutation was identified in one or more of the genes    Negative--no mutation was identified in any of the genes on this panel    Variant of unknown significance--a variation in one of the genes was identified, but it is unclear how this impacts cancer risk in the family    Advantages and Disadvantages   There are advantages and disadvantages to genetic testing.    Advantages    May clarify your cancer risk    Can help you make medical decisions    May explain the cancers in your family    May give useful information to your family members (if you share your results)    Disadvantages    Possible negative emotional impact of learning about inherited cancer risk    Uncertainty in interpreting a negative test result in some situations    Possible genetic discrimination concerns (see below)    Genetic Information Nondiscrimination Act (KARTHIK)  KARTHIK is a federal law that protects individuals from health insurance or employment discrimination based on a genetic test result alone.  Although rare, there are currently no legal discrimination protections in terms of life insurance, long term care, or disability insurances.  Visit the National Human Genome Research [place] website to learn more.    Reducing Cancer Risk  All of the genes described above have nationally recognized cancer screening guidelines that would be recommended for individuals who test positive.  In addition to increased cancer screening, surgeries may be offered or recommended to  reduce cancer risk.  Recommendations are based upon an individual s genetic test result as well as their personal and family history of cancer.    Questions to Think About Regarding Genetic Testing:    What effect will the test result have on me and my relationship with my family members if I have an inherited gene mutation?  If I don t have a gene mutation?    Should I share my test results, and how will my family react to this news, which may also affect them?    Are my children ready to learn new information that may one day affect their own health?    Hereditary Cancer Resources    FORCE: Facing Our Risk of Cancer Empowered [email or web address]   Bright Pink bebrightpink.org   Li-Fraumeni Syndrome Association lfsassociation.org   PTEN World [email or web address]   No stomach for cancer, Inc. nostomachforcancer.org   Stomach cancer relief network Scrnet.org   Collaborative Group of the Americas on Inherited Colorectal Cancer (CGA) cgaicc.com    Cancer Care cancercare.org   American Cancer Society (ACS) cancer.org   National Cancer Bivalve (NCI) cancer.gov     Please call us if you have any questions or concerns.   Cancer Risk Management Program [phone] ([phone])  ? Cezar Ferreira, MS, [facility] [phone]  X     Mitzi Llamas, MS, [facility]  [phone]   [email or web address]  ? Catherine Worrell, MS, [facility]  [phone]  ? Ora Hernandez, MS, [facility] [phone]  ? Anju Ford, MS, [facility] [phone]  ? Mary Hall, MS, [facility]  [phone]  ? Kristen Valentin, MS  [phone]    References  1. Trisha SEPULVEDA, Ivan PDP, Yann S, Andrea COOLEY, Catherine JE, Negro JL, Miguelina N, Rufino H, Ruth O, Carroll A, Chelsea B, Carl P, Radha S, Jose DM, Davin N, Jahaira E, Katyt H, Kevin E, Mikayla J, Luz J, Royce B, Rodolfo H, Lowelllaci S, Eerola H, Lex H, Wagner IBANEZ, Kimberly OP. Average risks of breast and ovarian cancer associated with BRCA1 or BRCA2 mutations detected in case series unselected for family  history: a combined analysis of 222 studies. Am J Hum Jolene. 2003;72:1117-30.  2. Glen N, Glo M, Cristiane G.  BRCA1 and BRCA2 Hereditary Breast and Ovarian Cancer. Gene Reviews online. 2013.  3. Bebeto YC, Chelsey S, Priscilla G, Mely S. Breast cancer risk among male BRCA1 and BRCA2 mutation carriers. J Natl Cancer Inst. 2007;99:1811-4.  4. Carlos TRIVEDI, Kristine I, Omari J, Daria E, Carlito ER, Adelina F. Risk of breast cancer in male BRCA2 carriers. J Med Jolene. 2010;47:710-1.  5. National Comprehensive Cancer Network. Clinical practice guidelines in oncology, colorectal cancer screening. Available online (registration required). 2015.  6. Aric MH, Rj J, Martina J, Hannah MUSA, Tyron MS, Sena C. Lifetime cancer risks in individuals with germline PTEN mutations. Clin Cancer Res. 2012;18:400-7.  7. Pilclaudiaki R. Cowden Syndrome: A Critical Review of the Clinical Literature. J Jolene . 2009:18:13-27.  8. Dayanara A, Bandar D, Chucho S, Misti P, Chaglji T, Lilo M, Torsten B, Janegin H, Jay Jay R, Shayy K, Rajinder L, Carlos TRIVEDI, Jose D, Gibran DF, Darren MR, The Breast Cancer Susceptibility Collaboration (UK) & Ashley N. LIU mutations that cause ataxia-telangiectasia are breast cancer susceptibility alleles. Nature Genetics. 2006;38:873-875  9. Ephraim N , Александр Y, Nicole J, Sandoval L, Lb GM , Wes ML, Christineinger S, Moss AG, Syngal S, Mary Alice ML, Bryan J , Dmitriy R, Mallorie SZ, Alexandrea JR, Roxann VE, Ariane M, Vogelstein B, Ariella N, Taryn RH, Mei KW, and Ingrid AP. LIU mutations in patients with hereditary pancreatic cancer. Cancer Discover. 2012;2:41-46  10. Trisha HERNANDEZ, et al. Breast-Cancer Risk in Families with Mutations in PALB2. NEJM. 2014; 371(6):497-506.  11. CHEK2 Breast Cancer Case-Control Consortium. CHEK2*1100delC and susceptibility to breast cancer: A collaborative analysis involving 10,860 breast cancer cases and 9,065 controls from 10 studies. Am J Hum Jolene, 74  (2004), pp. 1165-5523  12. Leslie T, Chuy S, Zeyad K, et al. Spectrum of Mutations in BRCA1, BRCA2, CHEK2, and TP53 in Families at High Risk of Breast Cancer. KETAN. 2006;295(12):2761-0105.   13. Anjel C, Yehuda D, Birdie A, et al. Risk of breast cancer in women with a CHEK2 mutation with and without a family history of breast cancer. J Clin Oncol. 2011;29:0879-8207.  14. Steven H, Uyen E, Kiana TOWNSEND, et al. Contribution of germline mutations in the RAD51B, RAD51C, and RAD51D genes to ovarian cancer in the population. J Clin Oncol. 2015;33(26):1328-0024. Doi:10.1200/JCO.2015.61.2408.  15. Murray T, Manuel DF, Kathy P, et al. Mutations in BRIP1 confer high risk of ovarian cancer. Angie Jolene. 2011;43(11):0086-5050. doi:10.1038/ng.955.

## 2021-09-09 NOTE — PROGRESS NOTES
"Cancer Risk Management Program Genetic Counseling Note    9/8/2021    [facility]    Presenting Information:   I had a telephone visit with Roxie Woods today for genetic counseling through the Cancer Risk Management Program, in order to discuss her personal and family history of breast cancer.  She presents today to review this history, cancer screening recommendations, and available genetic testing options.  This consultation was done via a telephone consult due to current COVID-19 restrictions (see attestation below).    Personal History:  Roxie is a 66 year old female. In June of this year, Roxie underwent a routine screening mammogram that noted some right breast asymmetry, which prompted additional imaging that noted a suspicious area, which was then biopsied.   Pathology findings from this right breast biopsy were consistent with an invasive ductal breast cancer; the cancer cells were noted to be estrogen receptor positive, progesterone receptor positive, and her-2 negative.  Roxie underwent an lumpectomy in July and plans to start radiation therapy soon.  After radiation is completed, Roxie plans hormonal therapy with Arimidex.  Based on her Oncotype score, no chemotherapy is planned at this time.    In other medical history, Roxie has a diagnosis of \"pre-diabetes,\" and hypertension.  In addition, Roxie is heterozygous for Factor V Leiden; she had that testing because of Factor V Leiden being identified first in her son, but Roxie reports no personal history of adverse clotting events in herself.  In her hormone-based medical history, it is noted that Roxie had her first menstrual period at age 13.  Roxie reports that she had used a variety of hormone-based contraceptives in the past, such as oral contraceptives, Depo Provera, Norplant, and IUDs.  She had her first (only) child around age 22.  She is post-menopausal.    With respect to cancer screening, Roxie " "reports that she had not had any previous abnormal mammograms or breast biopsies.  Roxie had a negative colonoscopy in .  Since that time, she has declined colonoscopy screening but has had stool-based colon cancer screening (FIT tests), which have all been negative; over the last several years, she has been having these stool testing approximately once a year.  Roxie had a skin lesion removed from the skin of her neck at the time of her breast surgery, but pathology studies were benign.    Roxie reports that she has a past history of smoking but quit around a year ago.  She reports no personal history of chronic, excessive alcohol use.  Roxie states that she is not aware of any other personal history of any specific, potentially concerning environmental exposures with respect to cancer risk.    Family History: (Please see scanned pedigree for detailed family history information)    As noted above, Roxie was diagnosed with breast cancer at age 65.    Roxie reports that, shortly after Roxie's own diagnosis, her sister was diagnosed at age 67 with a triple negative breast cancer.  Roxie states that her sister is planning on having genetic counseling/genetic testing later this week.    Roxie reports that their mother was diagnosed with breast cancer in her mid- to late-70s.  She reports that her mother had also been diagnosed with \"parathyroid disease,\" which had resulted in her having 3 of her parathyroid glands removed; Roxie states she thinks that there may have been parathyroid tumors noted.    Roxie reports that she is not aware of any other history of cancer on her mother's side of the family.    Roxie reports that her father was diagnosed with both prostate cancer (treated with surgery) and colon cancer (treated with chemotherapy) in his 70s.  She reports that he later  in his 80s for reasons unrelated to cancer.    Roxie states that one of her paternal aunts was diagnosed with " "breast cancer; she didn't know may details about that diagnosis, but she thought it occurred after age 50.    Roxie reports that her mother's family is of English and general  ancestry and that her father's family is of [place]an ancestry.  There is no known Ashkenazi Moravian ancestry on either side of her family. There is no reported consanguinity.    Discussion:    We reviewed the features of sporadic, familial, and hereditary cancers. In looking at Roxie's family history, it is possible that a cancer susceptibility gene is present due to the history of breast cancer in Roxie, her sister, and her mother.  We talked about that about 10% of breast cancers are thought to be related to a specific, single hereditary cancer gene mutation.  However, we talked about that it is thought that \"triple negative\" breast cancers (such as her sister's) are more likely to be related to an inherited mutation in a cancer susceptibility gene.      We discussed the natural history and genetics of hereditary cancer syndromes associated with breast cancer. A detailed handout regarding some of these hereditary cancer syndromes and the information we discussed was provided to Roxie after our appointment today and can be found in the after visit summary. Topics included: inheritance pattern, cancer risks, cancer screening recommendations, and also risks, benefits and limitations of testing.      Based on her personal and family history, Roxie meets current National Comprehensive Cancer Network (NCCN) criteria for genetic testing of high-penetrance breast cancer susceptibility genes (including BRCA1, BRCA2, CDH1, PALB2, PTEN, and TP53), because of the history of breast cancer in Roxie, her sister, and her mother (and supported by the triple negative status of her sister's breast cancer).       We discussed that there are additional genes besides those listed above that could cause increased risk for breast cancer. As many " of these genes present with overlapping features in a family and accurate cancer risk cannot always be established based upon the pedigree analysis alone, it would be reasonable for Roxie to consider panel genetic testing to analyze multiple genes at once.      Medical Management: For Roxie, we reviewed that the information from genetic testing may determine:    additional cancer screening for which Roxie may qualify (e.g. mammogram and breast MRI, more frequent colonoscopies, more frequent dermatologic exams, etc.),    options for risk-reducing surgeries Roxie could consider (e.g. bilateral mastectomy, surgery to remove her ovaries and/or uterus, etc.),      and targeted therapies for Roxie, if she were to develop certain cancers in the future (e.g. lumpectomy vs mastectomy, immunotherapy for individuals with Daly syndrome, PARP inhibitors for HBOC syndrome, etc.).     Plan:  1) After today's conversation, Roxie decided that she wanted to wait for her sister with triple negative breast cancer to first complete her genetic testing, before Roxie herself makes decisions about proceeding with genetic testing.    2) I encouraged Roxie to contact me after her sister receives her genetic testing results, and I can discuss with her the potential implications of those test results for Roxie (and other family members).    Mitzi Llamas MS, [facility]  Genetic Counselor    Time spent over the phone: 50 minutes    Roxie is a 66 year old who is being evaluated via a billable telephone visit.      What phone number would you like to be contacted at? [phone] (work phone)  How would you like to obtain your AVS? Mail    Phone call duration: 50  minutes

## 2021-09-09 NOTE — PATIENT INSTRUCTIONS
Assessing Cancer Risk  Only about 5-10% of cancers are thought to be due to an inherited cancer susceptibility gene.    These families often have:    Several people with the same or related types of cancer    Cancers diagnosed at a young age (before age 50)    Individuals with more than one primary cancer    Multiple generations of the family affected with cancer    Some people may be candidates for genetic testing of more than one gene.  For these families, genetic testing using a cancer panel may be offered.  These panels will test different genes known to increase the risk for breast, ovarian, uterine, and/or other cancers. All of the genes discussed below have published clinical management guidelines for individuals who are found to carry a mutation. The purpose of this handout is to serve as a brief summary of the genes analyzed by the panels used to inquire about hereditary breast and gynecologic cancer:  LIU, BRCA1, BRCA2, BRIP1, CDH1, CHEK2, MLH1, MSH2, MSH6, PMS2, EPCAM, PTEN, PALB2, RAD51C, RAD51D, and TP53.  ______________________________________________________________________________  Hereditary Breast and Ovarian Cancer Syndrome   (BRCA1 and BRCA2)  A single mutation in one of the copies of BRCA1 or BRCA2 increases the risk for breast and ovarian cancer, among others.  The risk for pancreatic cancer and melanoma may also be slightly increased in some families.  The chart below shows the chance that someone with a BRCA mutation would develop cancer in his or her lifetime1,2,3,4.        A person s ethnic background is also important to consider, as individuals of Ashkenazi Spiritism ancestry have a higher chance of having a BRCA gene mutation.  There are three BRCA mutations that occur more frequently in this population.    Daly Syndrome   (MLH1, MSH2, MSH6, PMS2, and EPCAM)  Currently five genes are known to cause Daly Syndrome: MLH1, MSH2, MSH6, PMS2, and EPCAM.  A single mutation in one of the  Daly Syndrome genes increases the risk for colon, endometrial, ovarian, and stomach cancers.  Other cancers that occur less commonly in Daly Syndrome include urinary tract, skin, and brain cancers.  The chart below shows the chance that a person with Daly syndrome would develop cancer in his or her lifetime5.      *Cancer risk varies depending on Daly syndrome gene found    Cowden Syndrome   (PTEN)  Cowden syndrome is a hereditary condition that increases the risk for breast, thyroid, endometrial, colon, and kidney cancer.  Cowden syndrome is caused by a mutation in the PTEN gene.  A single mutation in one of the copies of PTEN causes Cowden syndrome and increases cancer risk.  The chart below shows the chance that someone with a PTEN mutation would develop cancer in their lifetime6,7.  Other benign features seen in some individuals with Cowden syndrome include benign skin lesions (facial papules, keratoses, lipomas), learning disability, autism, thyroid nodules, colon polyps, and larger head size.      *One recent study found breast cancer risk to be increased to 85%    Li-Fraumeni Syndrome   (TP53)  Li-Fraumeni Syndrome (LFS) is a cancer predisposition syndrome caused by a mutation in the TP53 gene. A single mutation in one of the copies of TP53 increases the risk for multiple cancers. Individuals with LFS are at an increased risk for developing cancer at a young age. The lifetime risk for development of a LFS-associated cancer is 50% by age 30 and 90% by age 60.   Core Cancers: Sarcomas, Breast, Brain, Lung, Leukemias/Lymphomas, Adrenocortical carcinomas  Other Cancers: Gastrointestinal, Thyroid, Skin, Genitourinary    Hereditary Diffuse Gastric Cancer   (CDH1)  Currently, one gene is known to cause hereditary diffuse gastric cancer (HDGC): CDH1.  Individuals with HDGC are at increased risk for diffuse gastric cancer and lobular breast cancer. Of people diagnosed with HDGC, 30-50% have a mutation in the CDH1  gene.  This suggests there are likely other genes that may cause HDGC that have not been identified yet.      Lifetime Cancer Risks    General Population HDGC    Diffuse Gastric  <1% ~80%   Breast 12% 39-52%         Additional Genes  LIU  LIU is a moderate-risk breast cancer gene. Women who have a mutation in LIU can have between a 2-4 fold increased risk for breast cancer compared to the general population8. LIU mutations have also been associated with increased risk for pancreatic cancer, however an estimate of this cancer risk is not well understood9. Individuals who inherit two LIU mutations have a condition called ataxia-telangiectasia (AT).  This rare autosomal recessive condition affects the nervous system and immune system, and is associated with progressive cerebellar ataxia beginning in childhood.  Individuals with ataxia-telangiectasia often have a weakened immune system and have an increased risk for childhood cancers.    PALB2  Mutations in PALB2 have been shown to increase the risk of breast cancer up to 33-58% in some families; where individuals fall within this risk range is dependent upon family [id number]. PALB2 mutations have also been associated with increased risk for pancreatic cancer, although this risk has not been quantified yet.  Individuals who inherit two PALB2 mutations--one from their mother and one from their father--have a condition called Fanconi Anemia.  This rare autosomal recessive condition is associated with short stature, developmental delay, bone marrow failure, and increased risk for childhood cancers.    CHEK2   CHEK2 is a moderate-risk breast cancer gene.  Women who have a mutation in CHEK2 have around a 2-fold increased risk for breast cancer compared to the general population, and this risk may be higher depending upon family history.11,12,13 Mutations in CHEK2 have also been shown to increase the risk of a number of other cancers, including colon and prostate, however  these cancer risks are currently not well understood.    BRIP1, RAD51C and RAD51D  Mutations in BRIP1, RAD51C, and RAD51D have been shown to increase the risk of ovarian cancer and possibly female breast cancer as well14,15 .       Lifetime Cancer Risk    General Population BRIP1 RAD51C RAD51D   Ovarian 1-2% ~5-8% ~5-9% ~7-15%           Inheritance  All of the cancer syndromes reviewed above are inherited in an autosomal dominant pattern.  This means that if a parent has a mutation, each of his or her children will have a 50% chance of inheriting that same mutation.  Therefore, each child--male or female--would have a 50% chance of being at increased risk for developing cancer.      Image obtained from Genetics Home Reference, 2013     Mutations in some genes can occur de sarah, which means that a person s mutation occurred for the first time in them and was not inherited from a parent.  Now that they have the mutation, however, it can be passed on to future generations.    Genetic Testing  Genetic testing involves a blood test and will look at the genetic information in the LIU, BRCA1, BRCA2, BRIP1, CDH1, CHEK2, MLH1, MSH2, MSH6, PMS2, EPCAM, PTEN, PALB2, RAD51C, RAD51D, and TP53 genes for any harmful mutations that are associated with increased cancer risk.  If possible, it is recommended that the person(s) who has had cancer be tested before other family members.  That person will give us the most useful information about whether or not a specific gene is associated with the cancer in the family.    Results  There are three possible results of genetic testing:    Positive--a harmful mutation was identified in one or more of the genes    Negative--no mutation was identified in any of the genes on this panel    Variant of unknown significance--a variation in one of the genes was identified, but it is unclear how this impacts cancer risk in the family    Advantages and Disadvantages   There are advantages and  disadvantages to genetic testing.    Advantages    May clarify your cancer risk    Can help you make medical decisions    May explain the cancers in your family    May give useful information to your family members (if you share your results)    Disadvantages    Possible negative emotional impact of learning about inherited cancer risk    Uncertainty in interpreting a negative test result in some situations    Possible genetic discrimination concerns (see below)    Genetic Information Nondiscrimination Act (KARTHIK)  KARTHIK is a federal law that protects individuals from health insurance or employment discrimination based on a genetic test result alone.  Although rare, there are currently no legal discrimination protections in terms of life insurance, long term care, or disability insurances.  Visit the [employer] website to learn more.    Reducing Cancer Risk  All of the genes described above have nationally recognized cancer screening guidelines that would be recommended for individuals who test positive.  In addition to increased cancer screening, surgeries may be offered or recommended to reduce cancer risk.  Recommendations are based upon an individual s genetic test result as well as their personal and family history of cancer.    Questions to Think About Regarding Genetic Testing:    What effect will the test result have on me and my relationship with my family members if I have an inherited gene mutation?  If I don t have a gene mutation?    Should I share my test results, and how will my family react to this news, which may also affect them?    Are my children ready to learn new information that may one day affect their own health?    Hereditary Cancer Resources    FORCE: Facing Our Risk of Cancer Empowered facingourrisk.org   Bright Pink bebrightpink.org   Li-Fraumeni Syndrome Association lfsassociation.org   PTEN World PTENworld.com   No stomach for cancer, Inc.  nostomachforcancer.org   Stomach cancer relief network Scrnet.org   Collaborative Group of the Americas on Inherited Colorectal Cancer (CGA) cgaicc.com    Cancer Care cancercare.org   American Cancer Society (ACS) cancer.org   National Cancer [place] (NCI) cancer.gov     Please call us if you have any questions or concerns.   Cancer Risk Management Program [phone]-CANCER ([phone])  ? Cezar Ferreira, MS, [facility] [phone]  X   Mitzi Llamas, MS, [facility]  [phone]    [email or web address]  ? Catherine Evelynbart, MS, [facility]  [phone]  ? Ora Hernandez, MS, [facility] [phone]  ? Anju Liliana, MS, [facility] [phone]  ? Mary Rafael, MS, [facility]  [phone]  ? Kristen Valentin, MS  [phone]    References  1. Trisha SEPULVEDA, Ivan PDP, Yann S, Andrea COOLEY, Catherine JE, Negro JL, Miguelina N, Rufino H, Ruth O, Carroll A, Benoitini B, Radikarlee P, Mantono S, Jose DM, Davin N, Jahaira E, Katty H, Kevin E, Mikayla J, Luz J, Royce B, Rodolfo H, Thorlacius S, Eerola H, Lex H, Wagner K, Kimberly OP. Average risks of breast and ovarian cancer associated with BRCA1 or BRCA2 mutations detected in case series unselected for family history: a combined analysis of 222 studies. Am J Hum Jolene. 2003;72:1117-30.  2. Glen N, Glo M, Sauer G.  BRCA1 and BRCA2 Hereditary Breast and Ovarian Cancer. Gene Reviews online. 2013.  3. Bebeto YC, Chelsey S, Priscilla G, Boone S. Breast cancer risk among male BRCA1 and BRCA2 mutation carriers. J Natl Cancer Inst. 2007;99:1811-4.  4. Carlos TRIVEDI, Kristine I, Omari J, Daria E, Carlito ER, Adelina F. Risk of breast cancer in male BRCA2 carriers. J Med Jolene. 2010;47:710-1.  5. National Comprehensive Cancer Network. Clinical practice guidelines in oncology, colorectal cancer screening. Available online (registration required). 2015.  6. Aric MCCLURE, Rj J, Martina J, Hannah MUSA, Tyron ESTEVEZ, Sena C. Lifetime cancer risks in individuals with germline PTEN mutations. Clin Cancer Res.  2012;18:400-7.  7. Gypsy MACHADO. Cowden Syndrome: A Critical Review of the Clinical Literature. J Jolene . 2009:18:13-27.  8. Dayanara SEPULVEDA, Bandar D, Chucho S, Misti P, Kiana T, Lilo M, Torsten B, Jennifer H, Jay Jay R, Shayy K, Rajinder L, Carlos DG, Jose D, Gibran DF, Darren MR, The Breast Cancer Susceptibility Collaboration ([place]) & Ashley GONZALES. LIU mutations that cause ataxia-telangiectasia are breast cancer susceptibility alleles. Nature Genetics. 2006;38:873-875  9. Ephraim N , Александр Y, Nicole J, Sandoval L, Lb GM , Wes ML, Gallinger S, Moss AG, Syngal S, Mary Alice ML, Bryan J , Dmitriy R, Mallorie SZ, Alexandrea JR, Roxann VE, Ariane M, Vogelstein B, Ariella N, Taryn RH, Mei KW, and Ingrid AP. LIU mutations in patients with hereditary pancreatic cancer. Cancer Discover. 2012;2:41-46  10. Trisha HERNANDEZ, et al. Breast-Cancer Risk in Families with Mutations in PALB2. NEJM. 2014; 371(6):497-506.  11. CHEK2 Breast Cancer Case-Control Consortium. CHEK2*1100delC and susceptibility to breast cancer: A collaborative analysis involving 10,860 breast cancer cases and 9,065 controls from 10 studies. Am J Hum Jolene, 74 (2004), pp. 3041-6372  12. Leslie T, Chuy S, Zeyad K, et al. Spectrum of Mutations in BRCA1, BRCA2, CHEK2, and TP53 in Families at High Risk of Breast Cancer. KETAN. 2006;295(12):4404-4875.   13. Anjel PATRICIO, Yehuda ANDERSON, Birdie A, et al. Risk of breast cancer in women with a CHEK2 mutation with and without a family history of breast cancer. J Clin Oncol. 2011;29:9647-0399.  14. Steven H, Uyen E, Kiana SJ, et al. Contribution of germline mutations in the RAD51B, RAD51C, and RAD51D genes to ovarian cancer in the population. J Clin Oncol. 2015;33(26):5780-7620. Doi:10.1200/JCO.2015.61.2408.  15. Murray T, Manuel HERNANDEZ, Kathy P, et al. Mutations in BRIP1 confer high risk of ovarian cancer. Angie Jolene. 2011;43(11):7695-9925. doi:10.1038/ng.955.

## 2021-09-10 ENCOUNTER — OFFICE VISIT (OUTPATIENT)
Dept: RADIATION ONCOLOGY | Facility: CLINIC | Age: 66
End: 2021-09-10
Payer: COMMERCIAL

## 2021-09-10 ENCOUNTER — HOSPITAL ENCOUNTER (OUTPATIENT)
Dept: BONE DENSITY | Facility: CLINIC | Age: 66
Discharge: HOME | End: 2021-09-10
Attending: INTERNAL MEDICINE | Admitting: INTERNAL MEDICINE
Payer: COMMERCIAL

## 2021-09-10 ENCOUNTER — PRE VISIT (OUTPATIENT)
Dept: RADIATION ONCOLOGY | Facility: CLINIC | Age: 66
End: 2021-09-10

## 2021-09-10 VITALS
WEIGHT: 238.6 LBS | DIASTOLIC BLOOD PRESSURE: 87 MMHG | RESPIRATION RATE: 16 BRPM | SYSTOLIC BLOOD PRESSURE: 119 MMHG | BODY MASS INDEX: 42.95 KG/M2 | OXYGEN SATURATION: 94 % | TEMPERATURE: 98.2 F | HEART RATE: 106 BPM

## 2021-09-10 DIAGNOSIS — Z17.0 MALIGNANT NEOPLASM OF UPPER-INNER QUADRANT OF RIGHT BREAST IN FEMALE, ESTROGEN RECEPTOR POSITIVE (H): Primary | ICD-10-CM

## 2021-09-10 DIAGNOSIS — E28.39 ESTROGEN DEFICIENCY: ICD-10-CM

## 2021-09-10 DIAGNOSIS — C50.211 MALIGNANT NEOPLASM OF UPPER-INNER QUADRANT OF RIGHT BREAST IN FEMALE, ESTROGEN RECEPTOR POSITIVE (H): Primary | ICD-10-CM

## 2021-09-10 PROCEDURE — 77332 RADIATION TREATMENT AID(S): CPT | Performed by: SURGERY

## 2021-09-10 PROCEDURE — 77290 THER RAD SIMULAJ FIELD CPLX: CPT | Performed by: SURGERY

## 2021-09-10 PROCEDURE — 77080 DXA BONE DENSITY AXIAL: CPT

## 2021-09-10 PROCEDURE — 99205 OFFICE O/P NEW HI 60 MIN: CPT | Mod: 25 | Performed by: SURGERY

## 2021-09-10 RX ORDER — MULTIPLE VITAMINS W/ MINERALS TAB 9MG-400MCG
2 TAB ORAL DAILY
COMMUNITY

## 2021-09-10 ASSESSMENT — PAIN SCALES - GENERAL: PAINLEVEL: NO PAIN (0)

## 2021-09-10 NOTE — NURSING NOTE
REASON FOR APPOINTMENT   Type of Cancer: R breast IDC ER/NV+ HER2-  Location: R breast  Date of Symptom Onset: Bilateral screening mammogram on 6/24/2021    TREATMENT TO-DATE FOR THIS CANCER  Surgery ? 7/22/2021 R breast lumpectomy with R SLN bx + IDC grade 2 (0/2 LN), L neck skin lesion- negative- Dr. Allan   Chemotherapy ? No Oncotype 11  Other Treatments for this Cancer ? Plan for Arimidex post RT    PERSONAL HISTORY OF CANCER   Previous Cancer ? no   Prior Radiation ? no   Prior Chemotherapy ? no   Prior Hormonal Therapy ? no     RECENT IMAGING STUDIES  bone scan (date: 9/10/2021, location: [facility])    REFERRALS NEEDED  no    VITALS  /87 (BP Location: Left arm, Patient Position: Sitting, Cuff Size: Adult Large)   Pulse 106   Temp 98.2  F (36.8  C) (Oral)   Resp 16   Wt 108.2 kg (238 lb 9.6 oz)   SpO2 94%   BMI 42.95 kg/m      PACEMAKER/IMPLANTED CARDIAC DEVICE no    PAIN  Denies    PSYCHOSOCIAL  Marital Status:   Patient lives in home with spouse.  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 Yes    REVIEW OF SYSTEMS  Skin: negative  Eyes: positive for glasses  Ears/Nose/Throat: positive for hearing loss  Respiratory: positive for Shortness of breath- occasional since recovering from COVID-19 in March 2021  Cardiovascular: negative  Gastrointestinal: negative  Genitourinary: negative  Musculoskeletal: negative  Neurologic: negative  Psychiatric: negative  Hematologic/Lymphatic/Immunologic: negative  Endocrine: negative    WOMEN ONLY  Any chance you may be pregnant: No  Age at first period: 13  Date of last period: post menopausal  Number of pregnancies: 3  Birth Control pills: Yes  If yes, for how long: 10 years total    Radiation Oncology Patient Teaching    Current Concern: R breast cancer    Person involved with teaching: Patient  Patient asked Questions: Yes  Patient was cooperative: Yes  Patient was  receptive (willing to accept information given): Yes    Education Assessment  Comprehension ability: High  Knowledge level: Medium  Factors affecting teaching: None    Educational Topics Discussed  External beam radiation therapy, schedule, side effects, fatigue, skin changes    Response To Teaching  Verbalizes understanding    Do you have an advanced directive or living will? no  Are you DNR/DNI? No    Consent to Communicate Complete- Yes 9/10/2021  Fall Risk Assessment Complete- Yes 9/10/2021    Brenna Simpson RN

## 2021-09-10 NOTE — PATIENT INSTRUCTIONS
Please contact [facility] RN with questions or concerns following today's appointment: [phone].    Thank you!

## 2021-09-10 NOTE — LETTER
9/10/2021         RE: Roxie Woods  [address] [facility] [address]        Dear Colleague,    Thank you for referring your patient, Roxie Woods, to the [facility] RADIATION ONCOLOGY [place]. Please see a copy of my visit note below.       Department of Radiation Oncology  [facility]: Cancer Center  [facility]  [address] [address] [place] [id number]  [phone]       Consultation Note    Name: Roxie Woods MRN: [id number]   : 1955   Date of Service: 9/15/2021  Referring: Dr. Allan, Dr. Colón     Reason for consultation: right breast cancer    History of Present Illness   Mrs. Woods is a 66YF with RIGHT breast cancer Stage IA ER+/FL+/Her2- breast cancer s/p BCS with SLNBx with pathology demonstrating, pT1cN0, 1.5 cm, 0/2 LN, negative margins, grade 2. Oncotype 11, not receiving adjuvant   chemotherapy.      Patient had a screening mammogram performed on 2021 demonstrating asymmetry with architectural distortion at 12 o'clock position of the right breast (upper midline.    She subsequently underwent a ultrasound-guided biopsy on 2021 which demonstrated a 1.2 cm abnormality.  Biopsy returned positive for invasive ductal carcinoma, grade 2, ER positive [place] positive HER-2 negative.    She underwent breast conservation surgery on 2021 (). Pathology returned as, pT1cN0, 1.5 cm, 0/2 LN, negative margins, grade 2.     She has seen Dr. Colón in Medical Mncology. Oncotype was 11,  And thus, it was not recommended she receive adjuvsnt chemotherapy.     Overall, patient states that she has healed well from surgery.  She denies any breast pain, nipple tenderness, extremity range of motion difficulties, lymphedema, and or wound dehiscence and or fevers or drainage.  She denies any history of prior radiation.    Past Medical History:   Past Medical History:   Diagnosis Date     DUB (dysfunctional  uterine bleeding)      Essential hypertension      Factor V Leiden mutation (H)      Malignant neoplasm of right breast in female, estrogen receptor positive (H) 06/30/2021     Menopausal disorder        Past Surgical History:   Past Surgical History:   Procedure Laterality Date     BREAST BIOPSY, CORE RT/LT Right 06/30/2021     CARPAL TUNNEL RELEASE RT/LT  11/17/1999    RT     EXCISE LESION NECK Left 07/22/2021    Procedure: Excise lesion left neck;  Surgeon: Agustín Allan MD;  Location: [facility] OR     MASTECTOMY PARTIAL WITH SENTINEL NODE Right 07/22/2021    Procedure: Right wire localized partial mastectomy with right sentinel lymph node biopsy;  Surgeon: Agustín Allan MD;  Location: [facility] OR     ZZC NONSPECIFIC PROCEDURE      Toenails       Chemotherapy History:  No prior chemotherapy    Radiation History:  No prior radiation thearpy    Pregnant: No  Implanted Cardiac Devices: No    Medications:  Current Outpatient Medications   Medication     hydrochlorothiazide (HYDRODIURIL) 25 MG tablet     lisinopril (ZESTRIL) 10 MG tablet     multivitamin w/minerals (MULTI-VITAMIN) tablet     simvastatin (ZOCOR) 20 MG tablet     venlafaxine (EFFEXOR) 25 MG tablet     VITAMIN D PO     anastrozole (ARIMIDEX) 1 MG tablet     No current facility-administered medications for this visit.         Allergies:     Allergies   Allergen Reactions     Zoloft Rash       Social History:  Tobacco: 1ppd for 40 years, quit 3/2020  Alcohol: occasional  Lives in [place], [place]      Family History:  Family History   Problem Relation Age of Onset     Diabetes Mother      Breast Cancer Mother      Cancer - colorectal Father      Prostate Cancer Father      Diabetes Maternal Grandfather      Breast Cancer Sister        Review of Systems   A 10-point review of systems was performed. Pertinent findings are noted in the HPI.    Physical Exam   KPS 80    Vitals:  /87 (BP Location: Left arm, Patient Position: Sitting, Cuff Size: Adult Large)   Pulse 106    Temp 98.2  F (36.8  C) (Oral)   Resp 16   Wt 108.2 kg (238 lb 9.6 oz)   SpO2 94%   BMI 42.95 kg/m      Gen: Alert, in NAD  Head: NC/AT  Eyes: PERRL, EOMI, sclera anicteric  Ears: No external auricular lesions  Breast: right breast incisions and axillary incision CDI, no pain, no erythema, no drainage, no range of motion difficulties, no cording   Nose/sinus: No rhinorrhea or epistaxis  Oral cavity/oropharynx: MMM, no visible oral cavity lesions, FOM and BOT are soft to palpation  Neck: Full ROM, supple, no palpable adenopathy  Pulm: No wheezing, stridor or respiratory distress  CV: Extremities are warm and well-perfused, no cyanosis, no pedal edema  Abdominal: Normal bowel sounds, soft, nontender, no masses  Musculoskeletal: Normal bulk and tone  Skin: Normal color and turgor  Neuro: A/Ox3, CN II-XII intact, normal gait    Imaging/Path/Labs   Imaging: per HPI, I have personally reviewed all images and am in agreement with the dictation     Path: per HPI,  I have personally reviewed all pathology records      Assessment      Mrs. Woods is a 66YF, KPS 80, with RIGHT breast cancer Stage IA ER+/[place]+/Her2- breast cancer s/p BCS with SLNBx (7/22/21, Dr Allan) with pathology demonstrating, pT1cN0, 1.5 cm, 0/2 LN, negative margins, grade 2. Oncotype 11, not receiving adjuvant chemotherapy (Dr. Colón).    We discussed the role of adjuvant whole breast radiotherapy after breast conservation surgery per NCCN guidelines. In patients with negative axillary lymph nodes, WBRT +/- boost is standard of care.      In general, the benefits for whole breast radiation were reviewed and explained that adjuvant whole   breast radiation following wide local excision decreases the risk of local recurrence by two-thirds to three-fourths and improves overall survival (Vazquez et al., NSABP B-06, NEJ, 2002; EBCTCG Metanalysis, Lancet 2011).      The options of conventional fractionation versus hypofractionation were reviewed.   However, we recommend hypofractionated radiation therapy based on results from the [place] hypofractionation trial (Theresa et al, NEJ, 2010) and the update of the [place] START trial (Violet et al, Lancet Oncol, 2013), which both show equivalent local control, survival and cosmesis with these shorter treatment schedules as compared to conventional radiation fractionation.    Further, we discussed the logistics of treatment planning and delivery in detail with the patient. We also discussed side effects, including short term risks of fatigue and skin reaction, and long term risks of pneumonitis, lung fibrosis, soft tissue fibrosis, skin changes, breast contour changes, rib fractures, cardiac damage, secondary cancers, lymphedema, and thyroid dysfunction. We described the use of 3D-conformal radiotherapy to minimize dose to the normal tissues, while adequately covering the target tissues, and the ability of this technique to decrease potential for toxicity.        Plan     1. After extensive discussion, patient wishes to proceed with adjuvant right whole breast radiation.     2. CT simulation today, with plan to start 9/20/2021. Plan for 40Gy/15fx tangets without boost.     3. Patient has already met with medical oncology (Dr Colón) and has a oncotype of 11 and will not receive chemotherapy. Patient will receive endocrine therapy after radiation.    All risks and benefits discussed and patient is in agreement with oncologic plan discussed above.     Kieran Dalal MD  Department of Radiation Oncology  [facility]       Again, thank you for allowing me to participate in the care of your patient.        Sincerely,        Kieran Dalal MD

## 2021-09-15 ENCOUNTER — LAB (OUTPATIENT)
Dept: LAB | Facility: OTHER | Age: 66
End: 2021-09-15
Payer: COMMERCIAL

## 2021-09-15 DIAGNOSIS — Z17.0 MALIGNANT NEOPLASM OF UPPER-INNER QUADRANT OF RIGHT BREAST IN FEMALE, ESTROGEN RECEPTOR POSITIVE (H): ICD-10-CM

## 2021-09-15 DIAGNOSIS — C50.211 MALIGNANT NEOPLASM OF UPPER-INNER QUADRANT OF RIGHT BREAST IN FEMALE, ESTROGEN RECEPTOR POSITIVE (H): ICD-10-CM

## 2021-09-15 PROCEDURE — U0003 INFECTIOUS AGENT DETECTION BY NUCLEIC ACID (DNA OR RNA); SEVERE ACUTE RESPIRATORY SYNDROME CORONAVIRUS 2 (SARS-COV-2) (CORONAVIRUS DISEASE [COVID-19]), AMPLIFIED PROBE TECHNIQUE, MAKING USE OF HIGH THROUGHPUT TECHNOLOGIES AS DESCRIBED BY CMS-2020-01-R: HCPCS

## 2021-09-15 PROCEDURE — U0005 INFEC AGEN DETEC AMPLI PROBE: HCPCS

## 2021-09-15 NOTE — PROGRESS NOTES
Department of Radiation Oncology  [facility]    Health: Cancer Center  [facility]  [address] [place] [id number]  [phone]       Consultation Note    Name: Roxie Woods MRN: [id number]   : 1955   Date of Service: 9/15/2021  Referring: Dr. Allan, Dr. Colón     Reason for consultation: right breast cancer    History of Present Illness   Mrs. Woods is a 66YF with RIGHT breast cancer Stage IA ER+/[place]+/Her2- breast cancer s/p BCS with SLNBx with pathology demonstrating, pT1cN0, 1.5 cm, 0/2 LN, negative margins, grade 2. Oncotype 11, not receiving adjuvant   chemotherapy.      Patient had a screening mammogram performed on 2021 demonstrating asymmetry with architectural distortion at 12 o'clock position of the right breast (upper midline.    She subsequently underwent a ultrasound-guided biopsy on 2021 which demonstrated a 1.2 cm abnormality.  Biopsy returned positive for invasive ductal carcinoma, grade 2, ER positive [place] positive HER-2 negative.    She underwent breast conservation surgery on 2021 (). Pathology returned as, pT1cN0, 1.5 cm, 0/2 LN, negative margins, grade 2.     She has seen Dr. Colón in Medical Mncology. Oncotype was 11,  And thus, it was not recommended she receive adjuvsnt chemotherapy.     Overall, patient states that she has healed well from surgery.  She denies any breast pain, nipple tenderness, extremity range of motion difficulties, lymphedema, and or wound dehiscence and or fevers or drainage.  She denies any history of prior radiation.    Past Medical History:   Past Medical History:   Diagnosis Date     DUB (dysfunctional uterine bleeding)      Essential hypertension      Factor V Leiden mutation (H)      Malignant neoplasm of right breast in female, estrogen receptor positive (H) 2021     Menopausal disorder        Past Surgical History:   Past Surgical History:   Procedure Laterality  Date     BREAST BIOPSY, CORE RT/LT Right 06/30/2021     CARPAL TUNNEL RELEASE RT/LT  11/17/1999    RT     EXCISE LESION NECK Left 07/22/2021    Procedure: Excise lesion left neck;  Surgeon: Agustín Allan MD;  Location: [facility] OR     MASTECTOMY PARTIAL WITH SENTINEL NODE Right 07/22/2021    Procedure: Right wire localized partial mastectomy with right sentinel lymph node biopsy;  Surgeon: Agustín Allan MD;  Location: [facility] OR     ZZC NONSPECIFIC PROCEDURE      Toenails       Chemotherapy History:  No prior chemotherapy    Radiation History:  No prior radiation thearpy    Pregnant: No  Implanted Cardiac Devices: No    Medications:  Current Outpatient Medications   Medication     hydrochlorothiazide (HYDRODIURIL) 25 MG tablet     lisinopril (ZESTRIL) 10 MG tablet     multivitamin w/minerals (MULTI-VITAMIN) tablet     simvastatin (ZOCOR) 20 MG tablet     venlafaxine (EFFEXOR) 25 MG tablet     VITAMIN D PO     anastrozole (ARIMIDEX) 1 MG tablet     No current facility-administered medications for this visit.         Allergies:     Allergies   Allergen Reactions     Zoloft Rash       Social History:  Tobacco: 1ppd for 40 years, quit 3/2020  Alcohol: occasional  Lives in [place], [place]      Family History:  Family History   Problem Relation Age of Onset     Diabetes Mother      Breast Cancer Mother      Cancer - colorectal Father      Prostate Cancer Father      Diabetes Maternal Grandfather      Breast Cancer Sister        Review of Systems   A 10-point review of systems was performed. Pertinent findings are noted in the HPI.    Physical Exam   KPS 80    Vitals:  /87 (BP Location: Left arm, Patient Position: Sitting, Cuff Size: Adult Large)   Pulse 106   Temp 98.2  F (36.8  C) (Oral)   Resp 16   Wt 108.2 kg (238 lb 9.6 oz)   SpO2 94%   BMI 42.95 kg/m      Gen: Alert, in NAD  Head: NC/AT  Eyes: PERRL, EOMI, sclera anicteric  Ears: No external auricular lesions  Breast: right breast incisions and axillary incision CDI,  no pain, no erythema, no drainage, no range of motion difficulties, no cording   Nose/sinus: No rhinorrhea or epistaxis  Oral cavity/oropharynx: MMM, no visible oral cavity lesions, FOM and BOT are soft to palpation  Neck: Full ROM, supple, no palpable adenopathy  Pulm: No wheezing, stridor or respiratory distress  CV: Extremities are warm and well-perfused, no cyanosis, no pedal edema  Abdominal: Normal bowel sounds, soft, nontender, no masses  Musculoskeletal: Normal bulk and tone  Skin: Normal color and turgor  Neuro: A/Ox3, CN II-XII intact, normal gait    Imaging/Path/Labs   Imaging: per HPI, I have personally reviewed all images and am in agreement with the dictation     Path: per HPI,  I have personally reviewed all pathology records      Assessment      Mrs. Woods is a 66YF, KPS 80, with RIGHT breast cancer Stage IA ER+/[place]+/Her2- breast cancer s/p BCS with SLNBx (7/22/21, Dr Allan) with pathology demonstrating, pT1cN0, 1.5 cm, 0/2 LN, negative margins, grade 2. Oncotype 11, not receiving adjuvant chemotherapy (Dr. Colón).    We discussed the role of adjuvant whole breast radiotherapy after breast conservation surgery per NCCN guidelines. In patients with negative axillary lymph nodes, WBRT +/- boost is standard of care.      In general, the benefits for whole breast radiation were reviewed and explained that adjuvant whole   breast radiation following wide local excision decreases the risk of local recurrence by two-thirds to three-fourths and improves overall survival (Vazquez et al., NSABP B-06, NEJM, 2002; EBCTCG Metanalysis, Lancet 2011).      The options of conventional fractionation versus hypofractionation were reviewed.  However, we recommend hypofractionated radiation therapy based on results from the [place] hypofractionation trial (Theresa et al, NEJM, 2010) and the update of the [place] START trial (Violet et al, Lancet Oncol, 2013), which both show equivalent local control, survival and cosmesis  with these shorter treatment schedules as compared to conventional radiation fractionation.    Further, we discussed the logistics of treatment planning and delivery in detail with the patient. We also discussed side effects, including short term risks of fatigue and skin reaction, and long term risks of pneumonitis, lung fibrosis, soft tissue fibrosis, skin changes, breast contour changes, rib fractures, cardiac damage, secondary cancers, lymphedema, and thyroid dysfunction. We described the use of 3D-conformal radiotherapy to minimize dose to the normal tissues, while adequately covering the target tissues, and the ability of this technique to decrease potential for toxicity.        Plan     1. After extensive discussion, patient wishes to proceed with adjuvant right whole breast radiation.     2. CT simulation today, with plan to start 9/20/2021. Plan for 40Gy/15fx tangets without boost.     3. Patient has already met with medical oncology (Dr Colón) and has a oncotype of 11 and will not receive chemotherapy. Patient will receive endocrine therapy after radiation.    All risks and benefits discussed and patient is in agreement with oncologic plan discussed above.     Kieran Dalal MD  Department of Radiation Oncology  [facility]

## 2021-09-16 ENCOUNTER — APPOINTMENT (OUTPATIENT)
Dept: RADIATION ONCOLOGY | Facility: CLINIC | Age: 66
End: 2021-09-16
Payer: COMMERCIAL

## 2021-09-16 LAB — SARS-COV-2 RNA RESP QL NAA+PROBE: NEGATIVE

## 2021-09-16 PROCEDURE — 77334 RADIATION TREATMENT AID(S): CPT | Performed by: SURGERY

## 2021-09-16 PROCEDURE — 77263 THER RADIOLOGY TX PLNG CPLX: CPT | Performed by: SURGERY

## 2021-09-16 PROCEDURE — 77300 RADIATION THERAPY DOSE PLAN: CPT | Performed by: SURGERY

## 2021-09-16 PROCEDURE — 77295 3-D RADIOTHERAPY PLAN: CPT | Performed by: SURGERY

## 2021-09-17 ENCOUNTER — APPOINTMENT (OUTPATIENT)
Dept: RADIATION ONCOLOGY | Facility: CLINIC | Age: 66
End: 2021-09-17
Payer: COMMERCIAL

## 2021-09-17 PROCEDURE — 77280 THER RAD SIMULAJ FIELD SMPL: CPT | Performed by: SURGERY

## 2021-09-20 ENCOUNTER — APPOINTMENT (OUTPATIENT)
Dept: RADIATION ONCOLOGY | Facility: CLINIC | Age: 66
End: 2021-09-20
Payer: COMMERCIAL

## 2021-09-20 PROCEDURE — 77412 RADIATION TX DELIVERY LVL 3: CPT | Performed by: RADIOLOGY

## 2021-09-21 ENCOUNTER — APPOINTMENT (OUTPATIENT)
Dept: RADIATION ONCOLOGY | Facility: CLINIC | Age: 66
End: 2021-09-21
Payer: COMMERCIAL

## 2021-09-21 PROCEDURE — 77412 RADIATION TX DELIVERY LVL 3: CPT | Performed by: SURGERY

## 2021-09-22 ENCOUNTER — APPOINTMENT (OUTPATIENT)
Dept: RADIATION ONCOLOGY | Facility: CLINIC | Age: 66
End: 2021-09-22
Payer: COMMERCIAL

## 2021-09-22 ENCOUNTER — OFFICE VISIT (OUTPATIENT)
Dept: RADIATION ONCOLOGY | Facility: CLINIC | Age: 66
End: 2021-09-22
Payer: COMMERCIAL

## 2021-09-22 VITALS
SYSTOLIC BLOOD PRESSURE: 116 MMHG | DIASTOLIC BLOOD PRESSURE: 68 MMHG | TEMPERATURE: 98.9 F | RESPIRATION RATE: 18 BRPM | BODY MASS INDEX: 42.46 KG/M2 | WEIGHT: 235.9 LBS | HEART RATE: 100 BPM | OXYGEN SATURATION: 95 %

## 2021-09-22 DIAGNOSIS — C50.211 MALIGNANT NEOPLASM OF UPPER-INNER QUADRANT OF RIGHT BREAST IN FEMALE, ESTROGEN RECEPTOR POSITIVE (H): Primary | ICD-10-CM

## 2021-09-22 DIAGNOSIS — Z17.0 MALIGNANT NEOPLASM OF UPPER-INNER QUADRANT OF RIGHT BREAST IN FEMALE, ESTROGEN RECEPTOR POSITIVE (H): Primary | ICD-10-CM

## 2021-09-22 PROCEDURE — 77412 RADIATION TX DELIVERY LVL 3: CPT | Performed by: SURGERY

## 2021-09-22 PROCEDURE — 99207 PR DROP WITH A PROCEDURE: CPT | Performed by: SURGERY

## 2021-09-22 ASSESSMENT — PAIN SCALES - GENERAL: PAINLEVEL: NO PAIN (0)

## 2021-09-22 NOTE — LETTER
2021         RE: Roxie Woods  [address] [facility] [address]        Dear Colleague,    Thank you for referring your patient, Roxie Woods, to the [facility] RADIATION ONCOLOGY [place]. Please see a copy of my visit note below.    [facility] PHYSICIANS  SPECIALIZING IN BREAKTHROUGHS  Radiation Oncology    On Treatment Visit Note      Roxie Woods      Date: Sep 22, 2021   MRN: [id number]   : 1955  Diagnosis: right breast ER (+)    ID: 66YF with RIGHT breast cancer Stage IA ER+/[place]+/Her2- breast cancer s/p BCS with SLNBx with pathology   demonstrating, pT1cN0, 1.5 cm, 0/2 LN, negative margins, grade 2. Oncotype 11, not receiving adjuvant   chemotherapy.        Reason for Visit:  On Radiation Treatment Visit     Treatment Summary to Date  Treatment Site: right breast Current Dose: 801/4005 cGy Fractions: 3/15      Chemotherapy  Chemo concurrent with radx?: No (Dr. Colón)      Subjective:   No complaints. No pain, no skin changes, energy levels normal.    Nursing ROS:   Nutrition Alteration  Diet Type: Patient's Preference  Skin  Skin Reaction: 0 - No changes  Skin Intervention: skin changes and skin cares reviewed, patient to use Aquaphor two times daily        Cardiovascular  Respiratory effort: 1 - Normal - without distress        Psychosocial  Mood - Anxiety: 0 - Normal  Mood - Depression: 0 - Normal  Pyschosocial Note: energy level at baseline  Pain Assessment  0-10 Pain Scale: 0      Objective:   /68 (BP Location: Left arm, Patient Position: Chair, Cuff Size: Adult Large)   Pulse 100   Temp 98.9  F (37.2  C) (Oral)   Resp 18   Wt 107 kg (235 lb 14.4 oz)   SpO2 95%   BMI 42.46 kg/m    Gen: Appears well, in no acute distress  Skin: No erythema    Labs:  CBC RESULTS: Recent Labs   Lab Test 21  0949   WBC 8.4   RBC 4.37   HGB 13.6   HCT 42.9   MCV 98   MCH 31.1   MCHC 31.7   RDW 13.4        ELECTROLYTES:  Recent Labs    Lab Test 06/23/21  0949      POTASSIUM 4.5   CHLORIDE 102   JAIDEN 8.9   CO2 30   BUN 18   CR 0.80   *       Assessment:    Tolerating radiation therapy well.  All questions and concerns addressed.    Toxicities:  Fatigue: Grade 0: No toxicity     Plan:   1. Continue current therapy.    2. Start Aquaphor BID      Mosaiq chart and setup information reviewed  MVCT/IGRT images checked    Medication Review  Med list reviewed with patient?: Yes  Med list printed and given: Offered and declined    Educational Topic Discussed  Education Instructions: radiation therapy side effects: fatigue, skin changes and skin cares reviewed    Kieran Dalal MD  Radiation Oncology   [facility]: [phone]          Again, thank you for allowing me to participate in the care of your patient.        Sincerely,        Kieran Dalal MD

## 2021-09-22 NOTE — PATIENT INSTRUCTIONS
Please contact [facility] RN with questions or concerns following today's appointment: [phone].    Thank you!

## 2021-09-22 NOTE — PROGRESS NOTES
[facility] PHYSICIANS  SPECIALIZING IN BREAKTHROUGHS  Radiation Oncology    On Treatment Visit Note      Roxie Woods      Date: Sep 22, 2021   MRN: [id number]   : 1955  Diagnosis: right breast ER (+)    ID: 66YF with RIGHT breast cancer Stage IA ER+/RI+/Her2- breast cancer s/p BCS with SLNBx with pathology   demonstrating, pT1cN0, 1.5 cm, 0/2 LN, negative margins, grade 2. Oncotype 11, not receiving adjuvant   chemotherapy.        Reason for Visit:  On Radiation Treatment Visit     Treatment Summary to Date  Treatment Site: right breast Current Dose: 801/4005 cGy Fractions: 3/15      Chemotherapy  Chemo concurrent with radx?: No (Dr. Colón)      Subjective:   No complaints. No pain, no skin changes, energy levels normal.    Nursing ROS:   Nutrition Alteration  Diet Type: Patient's Preference  Skin  Skin Reaction: 0 - No changes  Skin Intervention: skin changes and skin cares reviewed, patient to use Aquaphor two times daily        Cardiovascular  Respiratory effort: 1 - Normal - without distress        Psychosocial  Mood - Anxiety: 0 - Normal  Mood - Depression: 0 - Normal  Pyschosocial Note: energy level at baseline  Pain Assessment  0-10 Pain Scale: 0      Objective:   /68 (BP Location: Left arm, Patient Position: Chair, Cuff Size: Adult Large)   Pulse 100   Temp 98.9  F (37.2  C) (Oral)   Resp 18   Wt 107 kg (235 lb 14.4 oz)   SpO2 95%   BMI 42.46 kg/m    Gen: Appears well, in no acute distress  Skin: No erythema    Labs:  CBC RESULTS: Recent Labs   Lab Test 21  0949   WBC 8.4   RBC 4.37   HGB 13.6   HCT 42.9   MCV 98   MCH 31.1   MCHC 31.7   RDW 13.4        ELECTROLYTES:  Recent Labs   Lab Test 21  0949      POTASSIUM 4.5   CHLORIDE 102   JAIDEN 8.9   CO2 30   BUN 18   CR 0.80   *       Assessment:    Tolerating radiation therapy well.  All questions and concerns addressed.    Toxicities:  Fatigue: Grade 0: No toxicity     Plan:   1. Continue  current therapy.    2. Start Aquaphor BID      Mosaiq chart and setup information reviewed  MVCT/IGRT images checked    Medication Review  Med list reviewed with patient?: Yes  Med list printed and given: Offered and declined    Educational Topic Discussed  Education Instructions: radiation therapy side effects: fatigue, skin changes and skin cares reviewed    Kieran Dalal MD  Radiation Oncology   [facility]  Clinic: [phone]

## 2021-09-23 ENCOUNTER — APPOINTMENT (OUTPATIENT)
Dept: RADIATION ONCOLOGY | Facility: CLINIC | Age: 66
End: 2021-09-23
Payer: COMMERCIAL

## 2021-09-23 PROCEDURE — 77412 RADIATION TX DELIVERY LVL 3: CPT | Performed by: SURGERY

## 2021-09-24 ENCOUNTER — APPOINTMENT (OUTPATIENT)
Dept: RADIATION ONCOLOGY | Facility: CLINIC | Age: 66
End: 2021-09-24
Payer: COMMERCIAL

## 2021-09-24 PROCEDURE — 77336 RADIATION PHYSICS CONSULT: CPT | Performed by: SURGERY

## 2021-09-24 PROCEDURE — 77427 RADIATION TX MANAGEMENT X5: CPT | Performed by: SURGERY

## 2021-09-24 PROCEDURE — 77417 THER RADIOLOGY PORT IMAGE(S): CPT | Performed by: SURGERY

## 2021-09-24 PROCEDURE — 77412 RADIATION TX DELIVERY LVL 3: CPT | Performed by: SURGERY

## 2021-09-26 ENCOUNTER — HEALTH MAINTENANCE LETTER (OUTPATIENT)
Age: 66
End: 2021-09-26

## 2021-09-27 ENCOUNTER — APPOINTMENT (OUTPATIENT)
Dept: RADIATION ONCOLOGY | Facility: CLINIC | Age: 66
End: 2021-09-27
Payer: COMMERCIAL

## 2021-09-27 PROCEDURE — 77412 RADIATION TX DELIVERY LVL 3: CPT | Performed by: RADIOLOGY

## 2021-09-28 ENCOUNTER — APPOINTMENT (OUTPATIENT)
Dept: RADIATION ONCOLOGY | Facility: CLINIC | Age: 66
End: 2021-09-28
Payer: COMMERCIAL

## 2021-09-28 PROCEDURE — 77412 RADIATION TX DELIVERY LVL 3: CPT | Performed by: SURGERY

## 2021-09-29 PROCEDURE — 77412 RADIATION TX DELIVERY LVL 3: CPT | Performed by: SURGERY

## 2021-09-30 ENCOUNTER — APPOINTMENT (OUTPATIENT)
Dept: RADIATION ONCOLOGY | Facility: CLINIC | Age: 66
End: 2021-09-30
Payer: COMMERCIAL

## 2021-09-30 PROCEDURE — 77412 RADIATION TX DELIVERY LVL 3: CPT | Performed by: SURGERY

## 2021-10-01 ENCOUNTER — APPOINTMENT (OUTPATIENT)
Dept: RADIATION ONCOLOGY | Facility: CLINIC | Age: 66
End: 2021-10-01
Payer: COMMERCIAL

## 2021-10-01 PROCEDURE — 77336 RADIATION PHYSICS CONSULT: CPT | Performed by: SURGERY

## 2021-10-01 PROCEDURE — 77427 RADIATION TX MANAGEMENT X5: CPT | Performed by: SURGERY

## 2021-10-01 PROCEDURE — 77417 THER RADIOLOGY PORT IMAGE(S): CPT | Performed by: SURGERY

## 2021-10-01 PROCEDURE — 77412 RADIATION TX DELIVERY LVL 3: CPT | Performed by: SURGERY

## 2021-10-04 ENCOUNTER — APPOINTMENT (OUTPATIENT)
Dept: RADIATION ONCOLOGY | Facility: CLINIC | Age: 66
End: 2021-10-04
Payer: COMMERCIAL

## 2021-10-04 PROCEDURE — 77412 RADIATION TX DELIVERY LVL 3: CPT | Performed by: RADIOLOGY

## 2021-10-05 ENCOUNTER — APPOINTMENT (OUTPATIENT)
Dept: RADIATION ONCOLOGY | Facility: CLINIC | Age: 66
End: 2021-10-05
Payer: COMMERCIAL

## 2021-10-05 PROCEDURE — 77412 RADIATION TX DELIVERY LVL 3: CPT | Performed by: SURGERY

## 2021-10-06 ENCOUNTER — APPOINTMENT (OUTPATIENT)
Dept: RADIATION ONCOLOGY | Facility: CLINIC | Age: 66
End: 2021-10-06
Payer: COMMERCIAL

## 2021-10-06 ENCOUNTER — OFFICE VISIT (OUTPATIENT)
Dept: RADIATION ONCOLOGY | Facility: CLINIC | Age: 66
End: 2021-10-06
Payer: COMMERCIAL

## 2021-10-06 VITALS
SYSTOLIC BLOOD PRESSURE: 122 MMHG | TEMPERATURE: 97.8 F | HEART RATE: 94 BPM | RESPIRATION RATE: 18 BRPM | OXYGEN SATURATION: 96 % | WEIGHT: 239.8 LBS | DIASTOLIC BLOOD PRESSURE: 73 MMHG | BODY MASS INDEX: 43.16 KG/M2

## 2021-10-06 DIAGNOSIS — C50.211 MALIGNANT NEOPLASM OF UPPER-INNER QUADRANT OF RIGHT BREAST IN FEMALE, ESTROGEN RECEPTOR POSITIVE (H): Primary | ICD-10-CM

## 2021-10-06 DIAGNOSIS — Z17.0 MALIGNANT NEOPLASM OF UPPER-INNER QUADRANT OF RIGHT BREAST IN FEMALE, ESTROGEN RECEPTOR POSITIVE (H): Primary | ICD-10-CM

## 2021-10-06 PROCEDURE — 77412 RADIATION TX DELIVERY LVL 3: CPT | Performed by: SURGERY

## 2021-10-06 PROCEDURE — 99207 PR DROP WITH A PROCEDURE: CPT | Performed by: SURGERY

## 2021-10-06 ASSESSMENT — PAIN SCALES - GENERAL: PAINLEVEL: NO PAIN (0)

## 2021-10-06 NOTE — PATIENT INSTRUCTIONS
Please contact [facility] RN with questions or concerns following today's appointment: [phone].    Thank you!

## 2021-10-06 NOTE — LETTER
10/6/2021         RE: Roxie Woods  [address] [facility] [address]        Dear Colleague,    Thank you for referring your patient, Roxie Woods, to the [facility] RADIATION ONCOLOGY [place]. Please see a copy of my visit note below.    [facility] PHYSICIANS  SPECIALIZING IN BREAKTHROUGHS  Radiation Oncology    On Treatment Visit Note      Roxie Woods      Date: Oct 6, 2021   MRN: [id number]   : 1955  Diagnosis: right breast ER (+)        ID: 66YF with RIGHT breast cancer Stage IA ER+/[place]+/Her2- breast cancer s/p BCS with SLNBx with pathology   demonstrating, pT1cN0, 1.5 cm, 0/2 LN, negative margins, grade 2. Oncotype 11, not receiving adjuvant   chemotherapy.    Reason for Visit:  On Radiation Treatment Visit       Treatment Summary to Date  Treatment Site: right breast Current Dose: 3471/4005 cGy Fractions: 13/15      Chemotherapy  Chemo concurrent with radx?: No (Dr. Colón)    Subjective: Stable mild nipple tenderness, 2/10 but not enough to take pain medications. No additional complaints, energy levels normal, using Aquaphor BID.     Nursing ROS:   Nutrition Alteration  Diet Type: Patient's Preference  Skin  Skin Reaction: 1 - Faint erythema or dry desquamation (no desquamation)  Skin Intervention: patient reports using Aquaphor two times per day  Skin Note: patient reports intermittent pruritis, reviewed use of Hydrocortisone 1% cream as needed, reviewed continued use of Aquaphor for one month after completion of radiation treatment        Cardiovascular  Respiratory effort: 1 - Normal - without distress        Psychosocial  Mood - Anxiety: 0 - Normal  Mood - Depression: 0 - Normal  Psychosocial Note: energy level at baseline  Pain Assessment  0-10 Pain Scale: 0  Pain Descriptors: Tender (right nipple)  Pain Duration: Intermittent  Pain Treatment: denies need for medication management      Objective:   /73 (BP Location: Left arm, Patient  Position: Chair, Cuff Size: Adult Large)   Pulse 94   Temp 97.8  F (36.6  C) (Oral)   Resp 18   Wt 108.8 kg (239 lb 12.8 oz)   SpO2 96%   BMI 43.16 kg/m    Gen: Appears well, in no acute distress  Skin: mild erythema over right breast    Labs:  CBC RESULTS: Recent Labs   Lab Test 06/23/21  0949   WBC 8.4   RBC 4.37   HGB 13.6   HCT 42.9   MCV 98   MCH 31.1   MCHC 31.7   RDW 13.4        ELECTROLYTES:  Recent Labs   Lab Test 06/23/21  0949      POTASSIUM 4.5   CHLORIDE 102   JAIDEN 8.9   CO2 30   BUN 18   CR 0.80   *       Assessment:    Tolerating radiation therapy well.  All questions and concerns addressed.    Toxicities:  Fatigue: Grade 0: No toxicity  Dermatitis: Grade 1: Faint erythema or dry desquamation     Plan:   1. Continue current therapy.    2. Continue Aquaphor BID  3. Patient completes RT this week. Will start endocrine therapy per Dr. Colón after RT  4. F/u in 1 month      Mosaiq chart and setup information reviewed  Ports checked    Medication Review  Med list reviewed with patient?: Yes  Med list printed and given: Offered and declined    Educational Topic Discussed  Additional Instructions: reviewed follow-up with Dr. Colón - patient will go to scheduling to schedule in December, 2021 as previously recommended, follow-up with Dr. Dalal in clinic in one month - to be scheduled by radiation scheduling  Education Instructions: reviewed continued skin cares and fatigue management    Kieran Dalal MD  Radiation Oncology   [facility]  Clinic: [phone]          Again, thank you for allowing me to participate in the care of your patient.        Sincerely,        Kieran Dalal MD

## 2021-10-06 NOTE — PROGRESS NOTES
[facility] PHYSICIANS  SPECIALIZING IN BREAKTHROUGHS  Radiation Oncology    On Treatment Visit Note      Roxie Woods      Date: Oct 6, 2021   MRN: [id number]   : 1955  Diagnosis: right breast ER (+)        ID: 66YF with RIGHT breast cancer Stage IA ER+/WY+/Her2- breast cancer s/p BCS with SLNBx with pathology   demonstrating, pT1cN0, 1.5 cm, 0/2 LN, negative margins, grade 2. Oncotype 11, not receiving adjuvant   chemotherapy.    Reason for Visit:  On Radiation Treatment Visit       Treatment Summary to Date  Treatment Site: right breast Current Dose: 3471/4005 cGy Fractions: 13/15      Chemotherapy  Chemo concurrent with radx?: No (Dr. Colón)    Subjective: Stable mild nipple tenderness, 2/10 but not enough to take pain medications. No additional complaints, energy levels normal, using Aquaphor BID.     Nursing ROS:   Nutrition Alteration  Diet Type: Patient's Preference  Skin  Skin Reaction: 1 - Faint erythema or dry desquamation (no desquamation)  Skin Intervention: patient reports using Aquaphor two times per day  Skin Note: patient reports intermittent pruritis, reviewed use of Hydrocortisone 1% cream as needed, reviewed continued use of Aquaphor for one month after completion of radiation treatment        Cardiovascular  Respiratory effort: 1 - Normal - without distress        Psychosocial  Mood - Anxiety: 0 - Normal  Mood - Depression: 0 - Normal  Psychosocial Note: energy level at baseline  Pain Assessment  0-10 Pain Scale: 0  Pain Descriptors: Tender (right nipple)  Pain Duration: Intermittent  Pain Treatment: denies need for medication management      Objective:   /73 (BP Location: Left arm, Patient Position: Chair, Cuff Size: Adult Large)   Pulse 94   Temp 97.8  F (36.6  C) (Oral)   Resp 18   Wt 108.8 kg (239 lb 12.8 oz)   SpO2 96%   BMI 43.16 kg/m    Gen: Appears well, in no acute distress  Skin: mild erythema over right breast    Labs:  CBC RESULTS: Recent Labs    Lab Test 06/23/21  0949   WBC 8.4   RBC 4.37   HGB 13.6   HCT 42.9   MCV 98   MCH 31.1   MCHC 31.7   RDW 13.4        ELECTROLYTES:  Recent Labs   Lab Test 06/23/21  0949      POTASSIUM 4.5   CHLORIDE 102   JAIDEN 8.9   CO2 30   BUN 18   CR 0.80   *       Assessment:    Tolerating radiation therapy well.  All questions and concerns addressed.    Toxicities:  Fatigue: Grade 0: No toxicity  Dermatitis: Grade 1: Faint erythema or dry desquamation     Plan:   1. Continue current therapy.    2. Continue Aquaphor BID  3. Patient completes RT this week. Will start endocrine therapy per Dr. Colón after RT  4. F/u in 1 month      Mosaiq chart and setup information reviewed  Ports checked    Medication Review  Med list reviewed with patient?: Yes  Med list printed and given: Offered and declined    Educational Topic Discussed  Additional Instructions: reviewed follow-up with Dr. Colón - patient will go to scheduling to schedule in December, 2021 as previously recommended, follow-up with Dr. Dalal in clinic in one month - to be scheduled by radiation scheduling  Education Instructions: reviewed continued skin cares and fatigue management    Kieran Dalal MD  Radiation Oncology   [facility]  Clinic: [phone]

## 2021-10-07 ENCOUNTER — APPOINTMENT (OUTPATIENT)
Dept: RADIATION ONCOLOGY | Facility: CLINIC | Age: 66
End: 2021-10-07
Payer: COMMERCIAL

## 2021-10-07 PROCEDURE — 77412 RADIATION TX DELIVERY LVL 3: CPT | Performed by: SURGERY

## 2021-10-08 ENCOUNTER — DOCUMENTATION ONLY (OUTPATIENT)
Dept: RADIATION ONCOLOGY | Facility: CLINIC | Age: 66
End: 2021-10-08

## 2021-10-08 ENCOUNTER — APPOINTMENT (OUTPATIENT)
Dept: RADIATION ONCOLOGY | Facility: CLINIC | Age: 66
End: 2021-10-08
Payer: COMMERCIAL

## 2021-10-08 DIAGNOSIS — C50.211 MALIGNANT NEOPLASM OF UPPER-INNER QUADRANT OF RIGHT BREAST IN FEMALE, ESTROGEN RECEPTOR POSITIVE (H): Primary | ICD-10-CM

## 2021-10-08 DIAGNOSIS — Z17.0 MALIGNANT NEOPLASM OF UPPER-INNER QUADRANT OF RIGHT BREAST IN FEMALE, ESTROGEN RECEPTOR POSITIVE (H): Primary | ICD-10-CM

## 2021-10-08 PROCEDURE — 77427 RADIATION TX MANAGEMENT X5: CPT | Performed by: SURGERY

## 2021-10-08 PROCEDURE — 77336 RADIATION PHYSICS CONSULT: CPT | Performed by: SURGERY

## 2021-10-08 PROCEDURE — 77412 RADIATION TX DELIVERY LVL 3: CPT | Performed by: SURGERY

## 2021-10-19 ENCOUNTER — DOCUMENTATION ONLY (OUTPATIENT)
Dept: RADIATION ONCOLOGY | Facility: CLINIC | Age: 66
End: 2021-10-19
Payer: COMMERCIAL

## 2021-10-19 DIAGNOSIS — Z17.0 MALIGNANT NEOPLASM OF UPPER-INNER QUADRANT OF RIGHT BREAST IN FEMALE, ESTROGEN RECEPTOR POSITIVE (H): Primary | ICD-10-CM

## 2021-10-19 DIAGNOSIS — C50.211 MALIGNANT NEOPLASM OF UPPER-INNER QUADRANT OF RIGHT BREAST IN FEMALE, ESTROGEN RECEPTOR POSITIVE (H): Primary | ICD-10-CM

## 2021-10-19 NOTE — PROGRESS NOTES
Radiotherapy Treatment Summary              PATIENT: Roxie oWods  MEDICAL RECORD NO: [id number]   : 1955     DIAGNOSIS/ID:  66YF with RIGHT breast cancer Stage IA ER+/RI+/Her2- breast cancer s/p BCS with SLNBx with pathology demonstrating, pT1cN0, 1.5 cm, 0/2 LN, negative margins, grade 2. Oncotype 11, not receiving adjuvant   chemotherapy.    INTENT OF RADIOTHERAPY: Adjuvant     CONCURRENT SYSTEMIC THERAPY: None             SITE OF TREATMENT: Right whole breast     DATES  OF TREATMENT: 21- 10/8/2021    TOTAL DOSE OF TREATMENT: 4005cGy/15fx     DOSE PER FRACTION OF TREATMENT: 267cGy       COMMENT/TOXICITY:  Mild erythema, no skin breakdown                  PAIN MANAGEMENT: did not require any pain medication throughout treatment                              FOLLOW UP PLAN:  1. Continue Aquaphor treatments  2. Will start endocrine therapy per Dr Colón.  3. F/U Radiation Clinic in 1 month     CC  Patient Care Team:  Ana Arboleda MD as PCP - General (Family Medicine)  Sanjana, MD Agustín as Assigned Surgical Provider  Ana Arboleda MD as Assigned PCP  Nancy Colón MD as MD (Medical Oncology)  Agustín Allan MD as MD (Surgery)  Kieran Dalal MD as MD (Radiation Oncology)  Hortensia Hernandez, RN as Specialty Care Coordinator (Radiation Oncology)  Kieran Dalal MD as Assigned Cancer Care Provider       Kieran Dalal M.D.  Department of Radiation Oncology  [facility]

## 2021-11-12 ENCOUNTER — PATIENT OUTREACH (OUTPATIENT)
Dept: ONCOLOGY | Facility: CLINIC | Age: 66
End: 2021-11-12

## 2021-11-12 ENCOUNTER — OFFICE VISIT (OUTPATIENT)
Dept: RADIATION ONCOLOGY | Facility: CLINIC | Age: 66
End: 2021-11-12
Payer: COMMERCIAL

## 2021-11-12 VITALS
WEIGHT: 238.8 LBS | OXYGEN SATURATION: 96 % | DIASTOLIC BLOOD PRESSURE: 66 MMHG | BODY MASS INDEX: 42.98 KG/M2 | SYSTOLIC BLOOD PRESSURE: 122 MMHG | TEMPERATURE: 98.4 F | RESPIRATION RATE: 16 BRPM | HEART RATE: 96 BPM

## 2021-11-12 DIAGNOSIS — Z17.0 MALIGNANT NEOPLASM OF UPPER-INNER QUADRANT OF RIGHT BREAST IN FEMALE, ESTROGEN RECEPTOR POSITIVE (H): Primary | ICD-10-CM

## 2021-11-12 DIAGNOSIS — C50.211 MALIGNANT NEOPLASM OF UPPER-INNER QUADRANT OF RIGHT BREAST IN FEMALE, ESTROGEN RECEPTOR POSITIVE (H): Primary | ICD-10-CM

## 2021-11-12 PROCEDURE — 99024 POSTOP FOLLOW-UP VISIT: CPT | Performed by: SURGERY

## 2021-11-12 ASSESSMENT — PAIN SCALES - GENERAL: PAINLEVEL: NO PAIN (0)

## 2021-11-12 NOTE — PROGRESS NOTES
Call from patient.  She has been on Arimidex for two weeks.  She has been experiencing hot flashes mostly at night where she is throwing the covers off and on; sometimes has them during the day.  She also has joint pain in her knees just with sitting and driving rating it 5/10.  She saw Dr. Orellana today in Radiation and he suggested talking to Dr. Colón about her symptoms.  She feels it is impacting her quality of life.  She has not tried any medication except for her oste biflex. She saw an advertisement on TV that you shouldn't take Ibuprofen type of medication if you're on high blood pressure meds.  Advised she could try Tylenol.  Will discuss with Dr. Colón for recommendations.

## 2021-11-12 NOTE — LETTER
2021         RE: Roxie Woods  [address] [facility] [address]        Dear Colleague,    Thank you for referring your patient, Roxie Woods, to the [facility] RADIATION [facility]COL[facility] [place]. Please see a copy of my visit note below.         Department of Radiation Oncology  [facility]: Cancer Center  [facility]  [address] [place] [address] [id number] [phone]       Radiation Oncology Follow-up Visit  2021      Roxie Woods  MRN: [id number]   : 1955     DISEASE TREATED/ID: 66YF with RIGHT breast cancer Stage IA ER+/TX+/Her2- breast cancer s/p BCS with SLNBx with pathology demonstrating, pT1cN0, 1.5 cm, 0/2 LN, negative margins, grade 2. Oncotype 11, not receiving adjuvant chemotherapy.    RADIATION THERAPY DELIVERED:   4005cGy/15fx to the right breast, 21- 10/8/2021      SYSTEMIC THERAPY:  Arimidex (Dr. Colón)    INTERVAL SINCE COMPLETION OF RADIATION THERAPY:   1 month    SUBJECTIVE:   Overall, patient is doing well from radiation.  She denies any breast tenderness or any difficulties with range of motion, or any skin breakdown.  She describes intermittent episodes pruritis of breast that she noticed for which she was using Neosporin without any relief. She will be trying steroid creams. She is on Arimidex per Dr. Colón and has noticed some hot flashes and some joint pains.  She scheduled to see Dr. Colón in 2021.     PHYSICAL EXAM:  /66 (BP Location: Left arm, Patient Position: Sitting, Cuff Size: Adult Large)   Pulse 96   Temp 98.4  F (36.9  C) (Oral)   Resp 16   Wt 108.3 kg (238 lb 12.8 oz)   SpO2 96%   BMI 42.98 kg/m    Gen: Alert, in NAD  Eyes: PERRL, EOMI, sclera anicteric  HENT     Head: NC/AT     Ears: No external auricular lesions     Nose/sinus: No rhinorrhea or epistaxis     Oral Cavity/Oropharynx: MMM  Neck: Supple, full ROM, no LAD  Pulm: No  wheezing, stridor or respiratory distress  CV: Well-perfused, no cyanosis, no pedal edema  Abdominal: Soft, nontender, nondistended, no hepatomegaly  Back: No step-offs or pain to palpation along the thoracolumbar spine, no CVA tenderness  Musculoskeletal: Normal bulk and tone   Skin:Right breast skin with hyperpigmentation, no skin breakdown  Neurologic: A/Ox3, CN II-XII intact  Psychiatric: Appropriate mood and affect    LABS AND IMAGING:  Reviewed.    IMPRESSION:   66YF with RIGHT breast cancer Stage IA ER+/SC+/Her2- breast cancer s/p BCS with SLNBx with pathology demonstrating, pT1cN0, 1.5 cm, 0/2 LN, negative margins, grade 2. Oncotype 11, not receiving adjuvant chemotherapy.She completed radiation therapy approximately 1 month ago and is overall doing well.  She is on Arimidex.    PLAN:   1.  Overall, patient doing well from radiation.  Follow up with radiation on an as-needed basis.     2.  Follow up with Dr. Colón for endocrine therapy and mammograms.       Kieran Dalal M.D.  Department of Radiation Oncology  [facility]             Again, thank you for allowing me to participate in the care of your patient.        Sincerely,        Kieran Dalal MD

## 2021-11-12 NOTE — PATIENT INSTRUCTIONS
Please contact [place] [facility] RN with questions or concerns following today's appointment: [phone].    Thank you!

## 2021-11-12 NOTE — NURSING NOTE
RADIATION ONCOLOGY BREAST FOLLOW-UP VISIT    Patient Name: Roxie Woods      : 1955     Age: 66 year old        ______________________________________________________________________________       Chief Complaint   Patient presents with     Radiation Therapy     Return appointment with Dr. Dalal     /66 (BP Location: Left arm, Patient Position: Sitting, Cuff Size: Adult Large)   Pulse 96   Temp 98.4  F (36.9  C) (Oral)   Resp 16   Wt 108.3 kg (238 lb 12.8 oz)   SpO2 96%   BMI 42.98 kg/m        History of Radiation Treatment:  Site: R breast  Total Dose: 4005 cGy  Date Completed: 10/8/2021  Clinic: [facility]  Physician: Dr. Dalal      Pain:  Denies    Labs:  Other Labs: No    Imaging:  None    Fatigue:   Grade 0: No toxicity    Skin:  Concerns (explain) - Patient reports itching  Lotions/Creams: Apply hydrocortisone 1% ointment for itching, let sit 10 min then apply CeraVe. Patient may use 2 x day and call if not improved after 1 week.     Range of Motion:   No Concerns    Lymphedema:  No Concerns  Referral Needed: No      Medical Oncologist: Dr. Colón    Endocrine Therapy: Arimidex- patient experiencing joint pain and hot flashes, instructed to reach out to Med/Onc regarding side effects      Future Appointments:      Dr. Colón Appointment Date: 12/10/2021   Dr. Allan Appointment Date: 2021    Appointment Date:         Additional Instructions: Follow up per Dr. Dalal    Nurse face-to-face time: Level 4:  15 min face to face time    Brenna Simpson RN

## 2021-11-12 NOTE — PROGRESS NOTES
Department of Radiation Oncology  [facility]    Health: Cancer Center  [facility]  [address] [place] [id number] [phone]       Radiation Oncology Follow-up Visit  2021      Roxie Woods  MRN: [id number]   : 1955     DISEASE TREATED/ID: 66YF with RIGHT breast cancer Stage IA ER+/NC+/Her2- breast cancer s/p BCS with SLNBx with pathology demonstrating, pT1cN0, 1.5 cm, 0/2 LN, negative margins, grade 2. Oncotype 11, not receiving adjuvant chemotherapy.    RADIATION THERAPY DELIVERED:   4005cGy/15fx to the right breast, 21- 10/8/2021      SYSTEMIC THERAPY:  Arimidex (Dr. Colón)    INTERVAL SINCE COMPLETION OF RADIATION THERAPY:   1 month    SUBJECTIVE:   Overall, patient is doing well from radiation.  She denies any breast tenderness or any difficulties with range of motion, or any skin breakdown.  She describes intermittent episodes pruritis of breast that she noticed for which she was using Neosporin without any relief. She will be trying steroid creams. She is on Arimidex per Dr. Colón and has noticed some hot flashes and some joint pains.  She scheduled to see Dr. Colón in 2021.     PHYSICAL EXAM:  /66 (BP Location: Left arm, Patient Position: Sitting, Cuff Size: Adult Large)   Pulse 96   Temp 98.4  F (36.9  C) (Oral)   Resp 16   Wt 108.3 kg (238 lb 12.8 oz)   SpO2 96%   BMI 42.98 kg/m    Gen: Alert, in NAD  Eyes: PERRL, EOMI, sclera anicteric  HENT     Head: NC/AT     Ears: No external auricular lesions     Nose/sinus: No rhinorrhea or epistaxis     Oral Cavity/Oropharynx: MMM  Neck: Supple, full ROM, no LAD  Pulm: No wheezing, stridor or respiratory distress  CV: Well-perfused, no cyanosis, no pedal edema  Abdominal: Soft, nontender, nondistended, no hepatomegaly  Back: No step-offs or pain to palpation along the thoracolumbar spine, no CVA tenderness  Musculoskeletal: Normal bulk and tone    Skin:Right breast skin with hyperpigmentation, no skin breakdown  Neurologic: A/Ox3, CN II-XII intact  Psychiatric: Appropriate mood and affect    LABS AND IMAGING:  Reviewed.    IMPRESSION:   66YF with RIGHT breast cancer Stage IA ER+/MA+/Her2- breast cancer s/p BCS with SLNBx with pathology demonstrating, pT1cN0, 1.5 cm, 0/2 LN, negative margins, grade 2. Oncotype 11, not receiving adjuvant chemotherapy.She completed radiation therapy approximately 1 month ago and is overall doing well.  She is on Arimidex.    PLAN:   1.  Overall, patient doing well from radiation.  Follow up with radiation on an as-needed basis.     2.  Follow up with Dr. Colón for endocrine therapy and mammograms.       Kieran Dalal M.D.  Department of Radiation Oncology  [facility]

## 2021-11-15 NOTE — PROGRESS NOTES
Call back to patient and advised to return call to writer regarding her questions about side effects from Arimidex as noted below.    Patient called back writer.  Advised per Dr. Colón that she recommends continuing Arimidex for a while longer in that sometimes side effects improve.  She could try Tylenol and she states she took one dose today.  She now feels some discomfort in her hands also.  Advised to give it another week or two and to call back if too uncomfortable.  Provided her with direct phone number.

## 2021-12-10 ENCOUNTER — ONCOLOGY VISIT (OUTPATIENT)
Dept: ONCOLOGY | Facility: CLINIC | Age: 66
End: 2021-12-10
Payer: COMMERCIAL

## 2021-12-10 VITALS
HEART RATE: 101 BPM | HEIGHT: 63 IN | RESPIRATION RATE: 16 BRPM | SYSTOLIC BLOOD PRESSURE: 128 MMHG | WEIGHT: 234.4 LBS | BODY MASS INDEX: 41.53 KG/M2 | TEMPERATURE: 98.3 F | OXYGEN SATURATION: 99 % | DIASTOLIC BLOOD PRESSURE: 85 MMHG

## 2021-12-10 DIAGNOSIS — C50.211 MALIGNANT NEOPLASM OF UPPER-INNER QUADRANT OF RIGHT BREAST IN FEMALE, ESTROGEN RECEPTOR POSITIVE (H): Primary | ICD-10-CM

## 2021-12-10 DIAGNOSIS — Z17.0 MALIGNANT NEOPLASM OF UPPER-INNER QUADRANT OF RIGHT BREAST IN FEMALE, ESTROGEN RECEPTOR POSITIVE (H): Primary | ICD-10-CM

## 2021-12-10 DIAGNOSIS — Z12.31 ENCOUNTER FOR SCREENING MAMMOGRAM FOR MALIGNANT NEOPLASM OF BREAST: ICD-10-CM

## 2021-12-10 PROCEDURE — 99214 OFFICE O/P EST MOD 30 MIN: CPT | Performed by: INTERNAL MEDICINE

## 2021-12-10 ASSESSMENT — PAIN SCALES - GENERAL: PAINLEVEL: NO PAIN (0)

## 2021-12-10 ASSESSMENT — MIFFLIN-ST. JEOR: SCORE: 1569.42

## 2021-12-10 NOTE — LETTER
12/10/2021         RE: Roxie Woods  [address] [facility] [address]        Dear Colleague,    Thank you for referring your patient, Roxie Woods, to the [facility]. Please see a copy of my visit note below.    [facility] PHYS[facility]  MEDICAL ONCOLOGY    RETURN PATIENT VISIT NOTE    Reason for visit: breast cancer      Oncology Treatment Summary  1.  6/24/2021 screening mammogram done an asymmetry with architectural distortion at the 12 o'clock position of the right breast.  Subsequent ultrasound was performed 6/30/2021 which found a 1.2 x 1.1 x 1.2 cm abnormality.  Biopsy was done which found an invasive ductal cancer grade 2 estrogen receptor 100% positive progesterone receptor 80% positive HER-2/shari negative via DONNIE.  2.  On 7/22/2021 she underwent a right lumpectomy for a 1.5 cm grade 2 invasive ductal cancer.  Margins were negative.  2 lymph nodes were removed both of which were negative.  3.  Oncotype was sent off which showed a recurrence score of 11  4. Adjuvant radiation completed 10/8/21  5. Started adjuvant arimidex around 10/20/21       HISTORY OF PRESENTING ILLNESS  Patient is a very pleasant 66-year-old female who presents today for followup. She did well with radiation, had the expected erythematous skin reaction but it resolved after. She felt like it was pretty east. She has since started her arimidex and is having hot flashes, they are not terrible, but they are annoying. She takes effector 25 mg bid for them. She also has noted a little more OA in her right knee.     PAST MEDICAL HISTORY  Hypertension, carpal tunnel surgery, factor V Leiden heterozygous mutation but no history of blood clots      CURRENT OUTPATIENT MEDICATIONS  Current Outpatient Medications   Medication     anastrozole (ARIMIDEX) 1 MG tablet     hydrochlorothiazide (HYDRODIURIL) 25 MG tablet     lisinopril (ZESTRIL) 10 MG tablet     multivitamin w/minerals  (MULTI-VITAMIN) tablet     simvastatin (ZOCOR) 20 MG tablet     venlafaxine (EFFEXOR) 25 MG tablet     VITAMIN D PO     No current facility-administered medications for this visit.        ALLERGIES     Allergies   Allergen Reactions     Zoloft Rash        SOCIAL HISTORY  She is  and lives with her  Flynn in [place], no tobacco use although she does smoke 1 pack a day for many years and quit this 1 year ago with the nicotine patch.  No excessive alcohol use she works part-time     FAMILY HISTORY  Mother had breast cancer in her 70s Father had prostate cancer and colon cancer in his 70s and sister was recently diagnosed with a triple negative breast cancer at 68     REVIEW OF SYSTEMS  Review Of Systems  Skin: negative  Eyes: negative  Ears/Nose/Throat: negative  Respiratory: No shortness of breath, dyspnea on exertion, cough, or hemoptysis  Cardiovascular: negative  Gastrointestinal: negative  Genitourinary: negative  Musculoskeletal: negative  Neurologic: negative  Psychiatric: negative  Hematologic/Lymphatic/Immunologic: negative  Endocrine: negative      PHYSICAL EXAM  B/P: 125/81, T: 98.5, P: 95, R: 16  Wt Readings from Last 3 Encounters:   12/10/21 106.3 kg (234 lb 6.4 oz)   11/12/21 108.3 kg (238 lb 12.8 oz)   10/06/21 108.8 kg (239 lb 12.8 oz)       ECOG PPS0    Physical Exam  Vitals reviewed.   Constitutional:       Appearance: Normal appearance. She is normal weight.   HENT:      Head: Normocephalic and atraumatic.   Eyes:      Extraocular Movements: Extraocular movements intact.      Conjunctiva/sclera: Conjunctivae normal.      Pupils: Pupils are equal, round, and reactive to light.   Pulmonary:      Effort: Pulmonary effort is normal.   Neurological:      General: No focal deficit present.      Mental Status: She is alert and oriented to person, place, and time. Mental status is at baseline.   Psychiatric:         Mood and Affect: Mood normal.         Behavior: Behavior normal.          Thought Content: Thought content normal.         Judgment: Judgment normal.              LABORATORY AND IMAGING STUDIES  Recent Labs   Lab Test 06/23/21  0949 04/23/19  1135 04/24/18  1136 04/25/17  1137 03/28/16  1208    137 137 139 138   POTASSIUM 4.5 4.5 4.2 4.1 4.0   CHLORIDE 102 104 102 102 103   CO2 30 26 25 28 28   ANIONGAP 2* 7 10 9 7   BUN 18 15 14 13 14   CR 0.80 0.60 0.66 0.65 0.62   * 91 98 101* 94   JAIDEN 8.9 9.7 9.3 9.2 8.7     No results for input(s): MAG, PHOS in the last 45981 hours.  Recent Labs   Lab Test 06/23/21  0949 03/28/16  1208   WBC 8.4 6.8   HGB 13.6 14.6    278   MCV 98 98     Recent Labs   Lab Test 06/23/21  0949 03/28/16  1208 05/05/15  1258   BILITOTAL  --  0.4 0.4   ALKPHOS  --  115 124   ALT 43 22 25   AST 30 19 18   ALBUMIN  --  3.8 3.8     No results found for: TSH  No results for input(s): CEA in the last 60864 hours.  Results for orders placed or performed during the hospital encounter of 09/10/21   DX Hip/Pelvis/Spine    Narrative    DX HIP/PELVIS/SPINE  9/10/2021 11:15 AM    HISTORY: Estrogen deficiency    FINDINGS: This DEXA scan was performed using a Cuedd scanner.   DEXA results are reported according to T-score.  The T-score is the  standard deviation from the peak bone mass in a normal young adult  population.  In accordance with the ISCD (International Society of  Clinical Densitometry), the lower of the total proximal femur vs  femoral neck T-score is reported.  Osteopenia is defined as a T-score  of -1.0 to -2.5.  Osteoporosis is defined as a T-score of less than  -2.5.    T-SCORES:  Lumbar Spine L1-L4 T-score: -0.3    Left Hip (Neck) T-score: -0.2  Right Hip (Neck) T-score: 0.0    Hip lowest neck BMD: 1.012 gm/cm2.    No prior studies for comparison.    FRAX 10-YEAR PROBABILITY OF FRACTURE*:  Not applicable because of normal bone mineral density.    *All treatment decisions require clinical judgment and consideration  of individual patient  factors which may not be captured in the FRAX  model and the risk of fracture may be over- or under-estimated by  FRAX.      Impression    IMPRESSION: Normal bone mineral density.    ANISHA TATUM MD         SYSTEM ID:  [id number]        ASSESSMENT  AND RECOMMENDATIONS:    1.  T1 cN0 M0 invasive ductal cancer of the right breast status post lumpectomy % positive MT 80% positive HER-2/shari negative via FISH with an Oncotype recurrence score of 11 s/p radiation on arimidex  2. Osteoarthritis  3. Hot flashes      Plan    1. She will continue her arimidex and see if the hot flashes improve with time.  2. Increase effexor to 50 mg bid to see if it helps her hot flashes,  3. Mammogram due in June and she will see me after.    Nancy Colón MD on 12/12/2021 at 7:58 AM          Again, thank you for allowing me to participate in the care of your patient.        Sincerely,        Nancy Colón MD

## 2021-12-10 NOTE — NURSING NOTE
"Oncology Rooming Note    December 10, 2021 12:31 PM   Roxie Woods is a 66 year old female who presents for:    Chief Complaint   Patient presents with     Oncology Clinic Visit     3 month follow up     Initial Vitals: /85 (BP Location: Left arm)   Pulse 101   Temp 98.3  F (36.8  C) (Oral)   Resp 16   Ht 1.588 m (5' 2.5\")   Wt 106.3 kg (234 lb 6.4 oz)   SpO2 99%   BMI 42.19 kg/m   Estimated body mass index is 42.19 kg/m  as calculated from the following:    Height as of this encounter: 1.588 m (5' 2.5\").    Weight as of this encounter: 106.3 kg (234 lb 6.4 oz). Body surface area is 2.17 meters squared.  No Pain (0) Comment: Data Unavailable   No LMP recorded. Patient is postmenopausal.  Allergies reviewed: Yes  Medications reviewed: Yes    Medications: Medication refills not needed today.  Pharmacy name entered into [facility]: [facility] [address] [facility] [address] [facility]    Clinical concerns: Right knee pain- improving but still present with walking, also continues to get hot flashes.        Ingris Kelly LPN              "

## 2021-12-10 NOTE — PROGRESS NOTES
[facility] PHYSICIANS  MEDICAL ONCOLOGY    RETURN PATIENT VISIT NOTE    Reason for visit: breast cancer      Oncology Treatment Summary  1.  6/24/2021 screening mammogram done an asymmetry with architectural distortion at the 12 o'clock position of the right breast.  Subsequent ultrasound was performed 6/30/2021 which found a 1.2 x 1.1 x 1.2 cm abnormality.  Biopsy was done which found an invasive ductal cancer grade 2 estrogen receptor 100% positive progesterone receptor 80% positive HER-2/shari negative via DONNIE.  2.  On 7/22/2021 she underwent a right lumpectomy for a 1.5 cm grade 2 invasive ductal cancer.  Margins were negative.  2 lymph nodes were removed both of which were negative.  3.  Oncotype was sent off which showed a recurrence score of 11  4. Adjuvant radiation completed 10/8/21  5. Started adjuvant arimidex around 10/20/21       HISTORY OF PRESENTING ILLNESS  Patient is a very pleasant 66-year-old female who presents today for followup. She did well with radiation, had the expected erythematous skin reaction but it resolved after. She felt like it was pretty east. She has since started her arimidex and is having hot flashes, they are not terrible, but they are annoying. She takes effector 25 mg bid for them. She also has noted a little more OA in her right knee.     PAST MEDICAL HISTORY  Hypertension, carpal tunnel surgery, factor V Leiden heterozygous mutation but no history of blood clots      CURRENT OUTPATIENT MEDICATIONS  Current Outpatient Medications   Medication     anastrozole (ARIMIDEX) 1 MG tablet     hydrochlorothiazide (HYDRODIURIL) 25 MG tablet     lisinopril (ZESTRIL) 10 MG tablet     multivitamin w/minerals (MULTI-VITAMIN) tablet     simvastatin (ZOCOR) 20 MG tablet     venlafaxine (EFFEXOR) 25 MG tablet     VITAMIN D PO     No current facility-administered medications for this visit.        ALLERGIES     Allergies   Allergen Reactions     Zoloft Rash        SOCIAL  HISTORY  She is  and lives with her  Flynn in [place], no tobacco use although she does smoke 1 pack a day for many years and quit this 1 year ago with the nicotine patch.  No excessive alcohol use she works part-time     FAMILY HISTORY  Mother had breast cancer in her 70s Father had prostate cancer and colon cancer in his 70s and sister was recently diagnosed with a triple negative breast cancer at 68     REVIEW OF SYSTEMS  Review Of Systems  Skin: negative  Eyes: negative  Ears/Nose/Throat: negative  Respiratory: No shortness of breath, dyspnea on exertion, cough, or hemoptysis  Cardiovascular: negative  Gastrointestinal: negative  Genitourinary: negative  Musculoskeletal: negative  Neurologic: negative  Psychiatric: negative  Hematologic/Lymphatic/Immunologic: negative  Endocrine: negative      PHYSICAL EXAM  B/P: 125/81, T: 98.5, P: 95, R: 16  Wt Readings from Last 3 Encounters:   12/10/21 106.3 kg (234 lb 6.4 oz)   11/12/21 108.3 kg (238 lb 12.8 oz)   10/06/21 108.8 kg (239 lb 12.8 oz)       ECOG PPS0    Physical Exam  Vitals reviewed.   Constitutional:       Appearance: Normal appearance. She is normal weight.   HENT:      Head: Normocephalic and atraumatic.   Eyes:      Extraocular Movements: Extraocular movements intact.      Conjunctiva/sclera: Conjunctivae normal.      Pupils: Pupils are equal, round, and reactive to light.   Pulmonary:      Effort: Pulmonary effort is normal.   Neurological:      General: No focal deficit present.      Mental Status: She is alert and oriented to person, place, and time. Mental status is at baseline.   Psychiatric:         Mood and Affect: Mood normal.         Behavior: Behavior normal.         Thought Content: Thought content normal.         Judgment: Judgment normal.              LABORATORY AND IMAGING STUDIES  Recent Labs   Lab Test 06/23/21  0949 04/23/19  1135 04/24/18  1136 04/25/17  1137 03/28/16  1208    137 137 139 138   POTASSIUM 4.5 4.5 4.2  4.1 4.0   CHLORIDE 102 104 102 102 103   CO2 30 26 25 28 28   ANIONGAP 2* 7 10 9 7   BUN 18 15 14 13 14   CR 0.80 0.60 0.66 0.65 0.62   * 91 98 101* 94   JAIDEN 8.9 9.7 9.3 9.2 8.7     No results for input(s): MAG, PHOS in the last 96383 hours.  Recent Labs   Lab Test 06/23/21  0949 03/28/16  1208   WBC 8.4 6.8   HGB 13.6 14.6    278   MCV 98 98     Recent Labs   Lab Test 06/23/21  0949 03/28/16  1208 05/05/15  1258   BILITOTAL  --  0.4 0.4   ALKPHOS  --  115 124   ALT 43 22 25   AST 30 19 18   ALBUMIN  --  3.8 3.8     No results found for: TSH  No results for input(s): CEA in the last 59514 hours.  Results for orders placed or performed during the hospital encounter of 09/10/21   DX Hip/Pelvis/Spine    Narrative    DX HIP/PELVIS/SPINE  9/10/2021 11:15 AM    HISTORY: Estrogen deficiency    FINDINGS: This DEXA scan was performed using a [employer] scanner.   DEXA results are reported according to T-score.  The T-score is the  standard deviation from the peak bone mass in a normal young adult  population.  In accordance with the ISCD (International Society of  Clinical Densitometry), the lower of the total proximal femur vs  femoral neck T-score is reported.  Osteopenia is defined as a T-score  of -1.0 to -2.5.  Osteoporosis is defined as a T-score of less than  -2.5.    T-SCORES:  Lumbar Spine L1-L4 T-score: -0.3    Left Hip (Neck) T-score: -0.2  Right Hip (Neck) T-score: 0.0    Hip lowest neck BMD: 1.012 gm/cm2.    No prior studies for comparison.    FRAX 10-YEAR PROBABILITY OF FRACTURE*:  Not applicable because of normal bone mineral density.    *All treatment decisions require clinical judgment and consideration  of individual patient factors which may not be captured in the FRAX  model and the risk of fracture may be over- or under-estimated by  FRAX.      Impression    IMPRESSION: Normal bone mineral density.    ANISHA TATUM MD         SYSTEM ID:  [id number]        ASSESSMENT  AND  RECOMMENDATIONS:    1.  T1 cN0 M0 invasive ductal cancer of the right breast status post lumpectomy % positive NC 80% positive HER-2/shari negative via FISH with an Oncotype recurrence score of 11 s/p radiation on arimidex  2. Osteoarthritis  3. Hot flashes      Plan    1. She will continue her arimidex and see if the hot flashes improve with time.  2. Increase effexor to 50 mg bid to see if it helps her hot flashes,  3. Mammogram due in June and she will see me after.    Nancy Colón MD on 12/12/2021 at 7:58 AM

## 2021-12-30 DIAGNOSIS — F32.5 MAJOR DEPRESSION IN REMISSION (H): ICD-10-CM

## 2021-12-30 NOTE — TELEPHONE ENCOUNTER
SUBJECTIVE/OBJECTIVE:                                                    Refill request receive for:  Venlafaxine    Last refill per fax: 9/29/21  Date of LOV r/t Medication: 12/10/21  Future appt scheduled? Yes: 6/10/22   Date faxed to clinic: 12/29/21    RECENT LABS/VITALS                                                      Recent Labs   Lab Test 06/23/21  0949 04/23/19  1135 03/28/16  1208 05/05/15  1258   CHOL 185 195   < > 256*   HDL 61 52   < > 54   LDL 99 113*   < > 177*   TRIG 125 152*   < > 123   CHOLHDLRATIO  --   --   --  4.7    < > = values in this interval not displayed.     Lab Results   Component Value Date    AST 30 06/23/2021     Lab Results   Component Value Date    ALT 43 06/23/2021     Lab Results   Component Value Date    A1C 5.7 06/23/2021    A1C 5.3 04/23/2019    A1C 5.3 03/28/2016    A1C 5.5 05/05/2015     Creatinine   Date Value Ref Range Status   06/23/2021 0.80 0.52 - 1.04 mg/dL Final   ]  Potassium   Date Value Ref Range Status   06/23/2021 4.5 3.4 - 5.3 mmol/L Final   ]  No results found for: TSH  BP Readings from Last 4 Encounters:   12/10/21 128/85   11/12/21 122/66   10/06/21 122/73   09/22/21 116/68       PLAN:                                                      Sent to provider to advise.

## 2022-01-03 RX ORDER — VENLAFAXINE 25 MG/1
50 TABLET ORAL 2 TIMES DAILY
Qty: 360 TABLET | Refills: 1 | Status: SHIPPED | OUTPATIENT
Start: 2022-01-03 | End: 2022-06-10

## 2022-01-06 NOTE — PROGRESS NOTES
Encounter entered in error. Please see treatment summary for details of radiation treatment course.    Kieran Dalal M.D.  Department of Radiation Oncology  [facility]

## 2022-01-07 ENCOUNTER — OFFICE VISIT (OUTPATIENT)
Dept: SURGERY | Facility: CLINIC | Age: 67
End: 2022-01-07
Payer: COMMERCIAL

## 2022-01-07 VITALS
SYSTOLIC BLOOD PRESSURE: 124 MMHG | WEIGHT: 233 LBS | DIASTOLIC BLOOD PRESSURE: 72 MMHG | TEMPERATURE: 97.8 F | HEIGHT: 63 IN | BODY MASS INDEX: 41.29 KG/M2

## 2022-01-07 DIAGNOSIS — C50.211 MALIGNANT NEOPLASM OF UPPER-INNER QUADRANT OF RIGHT BREAST IN FEMALE, ESTROGEN RECEPTOR POSITIVE (H): ICD-10-CM

## 2022-01-07 DIAGNOSIS — E66.01 MORBID OBESITY WITH BMI OF 40.0-44.9, ADULT (H): ICD-10-CM

## 2022-01-07 DIAGNOSIS — Z17.0 MALIGNANT NEOPLASM OF UPPER-INNER QUADRANT OF RIGHT BREAST IN FEMALE, ESTROGEN RECEPTOR POSITIVE (H): ICD-10-CM

## 2022-01-07 DIAGNOSIS — F32.5 MAJOR DEPRESSION IN REMISSION (H): ICD-10-CM

## 2022-01-07 DIAGNOSIS — D68.51 FACTOR 5 LEIDEN MUTATION, HETEROZYGOUS (H): ICD-10-CM

## 2022-01-07 PROCEDURE — 99214 OFFICE O/P EST MOD 30 MIN: CPT | Performed by: SURGERY

## 2022-01-07 ASSESSMENT — MIFFLIN-ST. JEOR: SCORE: 1563.07

## 2022-01-07 NOTE — PROGRESS NOTES
"  Assessment & Plan   Problem List Items Addressed This Visit        Digestive    Morbid obesity with BMI of 40.0-44.9, adult (H)       Hematologic    Factor 5 Leiden mutation, heterozygous (H)       Behavioral    Major depression in remission (H)       Other    Malignant neoplasm of upper-inner quadrant of right breast in female, estrogen receptor positive (H)         Left breast upper outer quadrant noted area of firmness at the 2:00 3 to 4 cm from the nipple areolar complex likely fibrocystic breast.  Patient to continue to check on this area if there is any changes to let me know and we will do imaging sooner than June.  Discussed surgical follow-up (Q6months for next 3 years; annually for year 4 and 5); this is optional as she will need to see the medical oncologist.  She would like to see me in 6 months for breast exam and to look at her mammogram.  All of her questions were otherwise answered.    25 minutes spent on the date of the encounter doing chart review, history and exam, documentation and further activities per the note       BMI:   Estimated body mass index is 41.94 kg/m  as calculated from the following:    Height as of this encounter: 1.588 m (5' 2.5\").    Weight as of this encounter: 105.7 kg (233 lb).       No follow-ups on file.    [facility] Allan MD  [facility]    Liliana Faustin is a 66 year old who presents for the following health issues:    right breast - IDC, 2/3; DCIS+; ER/[place] (+); HER2 (-) 1 oclock 6cm FNAC; 1.2 in greatest cm on US; Factor V lieden - no blood thinner    wire local partial mastectomy on 7/22/2021 - T1cN0' on arimidex since 10/20/21; adj radiation completed 10/8/2021; Oncotype RI 11    Doing well.  No issues.  Does have some joint pain on her bilateral knees.  Otherwise tolerating the arimidex with no issues.        Review of Systems   Constitutional, HEENT, cardiovascular, pulmonary, GI, , musculoskeletal, neuro, skin, endocrine and psych " "systems are negative, except as otherwise noted.      Objective    /72   Temp 97.8  F (36.6  C) (Temporal)   Ht 1.588 m (5' 2.5\")   Wt 105.7 kg (233 lb)   BMI 41.94 kg/m    Body mass index is 41.94 kg/m .  Physical Exam  Vitals reviewed.   HENT:      Head: Normocephalic.   Eyes:      Conjunctiva/sclera: Conjunctivae normal.   Cardiovascular:      Rate and Rhythm: Normal rate.      Pulses: Normal pulses.   Pulmonary:      Effort: Pulmonary effort is normal.   Chest:   Breasts:      Right: No nipple discharge, skin change, tenderness, axillary adenopathy or supraclavicular adenopathy.      Left: No nipple discharge, skin change, tenderness, axillary adenopathy or supraclavicular adenopathy.         Abdominal:      Palpations: Abdomen is soft.   Musculoskeletal:         General: Normal range of motion.      Cervical back: Normal range of motion.   Lymphadenopathy:      Upper Body:      Right upper body: No supraclavicular or axillary adenopathy.      Left upper body: No supraclavicular or axillary adenopathy.   Skin:     General: Skin is warm.      Capillary Refill: Capillary refill takes less than 2 seconds.   Neurological:      General: No focal deficit present.      Mental Status: She is alert.   Psychiatric:         Mood and Affect: Mood normal.                "

## 2022-01-07 NOTE — LETTER
"    1/7/2022         RE: Roxie Woods  [address] [facility] [address]        Dear Colleague,    Thank you for referring your patient, Roxie Woods, to the [facility]. Please see a copy of my visit note below.      Assessment & Plan   Problem List Items Addressed This Visit        Digestive    Morbid obesity with BMI of 40.0-44.9, adult (H)       Hematologic    Factor 5 Leiden mutation, heterozygous (H)       Behavioral    Major depression in remission (H)       Other    Malignant neoplasm of upper-inner quadrant of right breast in female, estrogen receptor positive (H)         Left breast upper outer quadrant noted area of firmness at the 2:00 3 to 4 cm from the nipple areolar complex likely fibrocystic breast.  Patient to continue to check on this area if there is any changes to let me know and we will do imaging sooner than June.  Discussed surgical follow-up (Q6months for next 3 years; annually for year 4 and 5); this is optional as she will need to see the medical oncologist.  She would like to see me in 6 months for breast exam and to look at her mammogram.  All of her questions were otherwise answered.    25 minutes spent on the date of the encounter doing chart review, history and exam, documentation and further activities per the note       BMI:   Estimated body mass index is 41.94 kg/m  as calculated from the following:    Height as of this encounter: 1.588 m (5' 2.5\").    Weight as of this encounter: 105.7 kg (233 lb).       No follow-ups on file.    Justen-Cele Allan MD  [facility]    Subjective   Roxie is a 66 year old who presents for the following health issues:    right breast - IDC, 2/3; DCIS+; ER/[place] (+); HER2 (-) 1 oclock 6cm FNAC; 1.2 in greatest cm on US; Factor V lieden - no blood thinner    wire local partial mastectomy on 7/22/2021 - T1cN0' on arimidex since 10/20/21; adj radiation completed 10/8/2021; Oncotype RI 11    Doing " "well.  No issues.  Does have some joint pain on her bilateral knees.  Otherwise tolerating the arimidex with no issues.        Review of Systems   Constitutional, HEENT, cardiovascular, pulmonary, GI, , musculoskeletal, neuro, skin, endocrine and psych systems are negative, except as otherwise noted.      Objective    /72   Temp 97.8  F (36.6  C) (Temporal)   Ht 1.588 m (5' 2.5\")   Wt 105.7 kg (233 lb)   BMI 41.94 kg/m    Body mass index is 41.94 kg/m .  Physical Exam  Vitals reviewed.   HENT:      Head: Normocephalic.   Eyes:      Conjunctiva/sclera: Conjunctivae normal.   Cardiovascular:      Rate and Rhythm: Normal rate.      Pulses: Normal pulses.   Pulmonary:      Effort: Pulmonary effort is normal.   Chest:   Breasts:      Right: No nipple discharge, skin change, tenderness, axillary adenopathy or supraclavicular adenopathy.      Left: No nipple discharge, skin change, tenderness, axillary adenopathy or supraclavicular adenopathy.         Abdominal:      Palpations: Abdomen is soft.   Musculoskeletal:         General: Normal range of motion.      Cervical back: Normal range of motion.   Lymphadenopathy:      Upper Body:      Right upper body: No supraclavicular or axillary adenopathy.      Left upper body: No supraclavicular or axillary adenopathy.   Skin:     General: Skin is warm.      Capillary Refill: Capillary refill takes less than 2 seconds.   Neurological:      General: No focal deficit present.      Mental Status: She is alert.   Psychiatric:         Mood and Affect: Mood normal.                    Again, thank you for allowing me to participate in the care of your patient.        Sincerely,        Agustín Allan MD    "

## 2022-03-13 ENCOUNTER — HEALTH MAINTENANCE LETTER (OUTPATIENT)
Age: 67
End: 2022-03-13

## 2022-04-17 DIAGNOSIS — I10 HYPERTENSION GOAL BP (BLOOD PRESSURE) < 140/90: ICD-10-CM

## 2022-04-19 RX ORDER — LISINOPRIL 10 MG/1
TABLET ORAL
Qty: 90 TABLET | Refills: 0 | Status: SHIPPED | OUTPATIENT
Start: 2022-04-19 | End: 2022-06-10

## 2022-04-19 RX ORDER — HYDROCHLOROTHIAZIDE 25 MG/1
25 TABLET ORAL DAILY
Qty: 90 TABLET | Refills: 0 | Status: SHIPPED | OUTPATIENT
Start: 2022-04-19 | End: 2022-06-10

## 2022-06-10 ENCOUNTER — ONCOLOGY VISIT (OUTPATIENT)
Dept: ONCOLOGY | Facility: CLINIC | Age: 67
End: 2022-06-10
Attending: INTERNAL MEDICINE
Payer: COMMERCIAL

## 2022-06-10 ENCOUNTER — ANCILLARY PROCEDURE (OUTPATIENT)
Dept: MAMMOGRAPHY | Facility: CLINIC | Age: 67
End: 2022-06-10
Attending: INTERNAL MEDICINE
Payer: COMMERCIAL

## 2022-06-10 VITALS
SYSTOLIC BLOOD PRESSURE: 119 MMHG | OXYGEN SATURATION: 64 % | HEART RATE: 101 BPM | DIASTOLIC BLOOD PRESSURE: 79 MMHG | TEMPERATURE: 98.5 F

## 2022-06-10 DIAGNOSIS — C50.211 MALIGNANT NEOPLASM OF UPPER-INNER QUADRANT OF RIGHT BREAST IN FEMALE, ESTROGEN RECEPTOR POSITIVE (H): ICD-10-CM

## 2022-06-10 DIAGNOSIS — Z17.0 MALIGNANT NEOPLASM OF UPPER-INNER QUADRANT OF RIGHT BREAST IN FEMALE, ESTROGEN RECEPTOR POSITIVE (H): ICD-10-CM

## 2022-06-10 DIAGNOSIS — E78.5 HYPERLIPIDEMIA LDL GOAL <130: ICD-10-CM

## 2022-06-10 DIAGNOSIS — I10 HYPERTENSION GOAL BP (BLOOD PRESSURE) < 140/90: ICD-10-CM

## 2022-06-10 DIAGNOSIS — Z17.0 MALIGNANT NEOPLASM OF UPPER-INNER QUADRANT OF RIGHT BREAST IN FEMALE, ESTROGEN RECEPTOR POSITIVE (H): Primary | ICD-10-CM

## 2022-06-10 DIAGNOSIS — Z12.31 ENCOUNTER FOR SCREENING MAMMOGRAM FOR MALIGNANT NEOPLASM OF BREAST: ICD-10-CM

## 2022-06-10 DIAGNOSIS — F32.5 MAJOR DEPRESSION IN REMISSION (H): ICD-10-CM

## 2022-06-10 DIAGNOSIS — C50.211 MALIGNANT NEOPLASM OF UPPER-INNER QUADRANT OF RIGHT BREAST IN FEMALE, ESTROGEN RECEPTOR POSITIVE (H): Primary | ICD-10-CM

## 2022-06-10 PROCEDURE — 77067 SCR MAMMO BI INCL CAD: CPT | Mod: GC | Performed by: RADIOLOGY

## 2022-06-10 PROCEDURE — 99213 OFFICE O/P EST LOW 20 MIN: CPT | Performed by: INTERNAL MEDICINE

## 2022-06-10 PROCEDURE — 77063 BREAST TOMOSYNTHESIS BI: CPT | Mod: GC | Performed by: RADIOLOGY

## 2022-06-10 RX ORDER — VENLAFAXINE 25 MG/1
50 TABLET ORAL 2 TIMES DAILY
Qty: 360 TABLET | Refills: 1 | Status: SHIPPED | OUTPATIENT
Start: 2022-06-10 | End: 2022-12-09

## 2022-06-10 RX ORDER — HYDROCHLOROTHIAZIDE 25 MG/1
25 TABLET ORAL DAILY
Qty: 90 TABLET | Refills: 3 | Status: SHIPPED | OUTPATIENT
Start: 2022-06-10 | End: 2023-07-11

## 2022-06-10 RX ORDER — LISINOPRIL 10 MG/1
TABLET ORAL
Qty: 90 TABLET | Refills: 3 | Status: SHIPPED | OUTPATIENT
Start: 2022-06-10 | End: 2023-07-11

## 2022-06-10 RX ORDER — SIMVASTATIN 20 MG
TABLET ORAL
Qty: 90 TABLET | Refills: 1 | Status: SHIPPED | OUTPATIENT
Start: 2022-06-10 | End: 2022-12-09

## 2022-06-10 RX ORDER — EXEMESTANE 25 MG/1
25 TABLET ORAL DAILY
Qty: 90 TABLET | Refills: 3 | Status: SHIPPED | OUTPATIENT
Start: 2022-06-10 | End: 2023-08-29

## 2022-06-10 ASSESSMENT — PAIN SCALES - GENERAL: PAINLEVEL: NO PAIN (0)

## 2022-06-10 NOTE — LETTER
6/10/2022         RE: Roxie Woods  [address] [facility] [address]        Dear Colleague,    Thank you for referring your patient, Roxie Woods, to the [facility]. Please see a copy of my visit note below.    [facility] PHYSICIANS  MEDICAL ONCOLOGY    RETURN PATIENT VISIT NOTE    Reason for visit: breast cancer      Oncology Treatment Summary  1.  6/24/2021 screening mammogram done an asymmetry with architectural distortion at the 12 o'clock position of the right breast.  Subsequent ultrasound was performed 6/30/2021 which found a 1.2 x 1.1 x 1.2 cm abnormality.  Biopsy was done which found an invasive ductal cancer grade 2 estrogen receptor 100% positive progesterone receptor 80% positive HER-2/shari negative via DONNIE.  2.  On 7/22/2021 she underwent a right lumpectomy for a 1.5 cm grade 2 invasive ductal cancer.  Margins were negative.  2 lymph nodes were removed both of which were negative.  3.  Oncotype was sent off which showed a recurrence score of 11  4. Adjuvant radiation completed 10/8/21  5. Started adjuvant arimidex 10/20/21 - January 2022 stopped due to arthalgias       HISTORY OF PRESENTING ILLNESS  Patient is a very pleasant 66-year-old female who presents today for followup. She tired taking the arimidex for a couple of months but noted she had significant arthralgias in both knees that were quite bothersome and she stopped it. Since then she has noted her aches and pains are better, but still present. She has not had any significant new issues.     PAST MEDICAL HISTORY  Hypertension, carpal tunnel surgery, factor V Leiden heterozygous mutation but no history of blood clots      CURRENT OUTPATIENT MEDICATIONS  Current Outpatient Medications   Medication     anastrozole (ARIMIDEX) 1 MG tablet     hydrochlorothiazide (HYDRODIURIL) 25 MG tablet     lisinopril (ZESTRIL) 10 MG tablet     multivitamin w/minerals (THERA-VIT-M) tablet      simvastatin (ZOCOR) 20 MG tablet     venlafaxine (EFFEXOR) 25 MG tablet     VITAMIN D PO     No current facility-administered medications for this visit.        ALLERGIES     Allergies   Allergen Reactions     Zoloft Rash        SOCIAL HISTORY  She is  and lives with her  Flynn in [place], no tobacco use although she does smoke 1 pack a day for many years and quit this 1 year ago with the nicotine patch.  No excessive alcohol use she works part-time     FAMILY HISTORY  Mother had breast cancer in her 70s Father had prostate cancer and colon cancer in his 70s and sister was recently diagnosed with a triple negative breast cancer at 68     REVIEW OF SYSTEMS  Review Of Systems  Skin: negative  Eyes: negative  Ears/Nose/Throat: negative  Respiratory: No shortness of breath, dyspnea on exertion, cough, or hemoptysis  Cardiovascular: negative  Gastrointestinal: negative  Genitourinary: negative  Musculoskeletal: negative  Neurologic: negative  Psychiatric: negative  Hematologic/Lymphatic/Immunologic: negative  Endocrine: negative      PHYSICAL EXAM  B/P: 125/81, T: 98.5, P: 95, R: 16  Wt Readings from Last 3 Encounters:   01/07/22 105.7 kg (233 lb)   12/10/21 106.3 kg (234 lb 6.4 oz)   11/12/21 108.3 kg (238 lb 12.8 oz)       ECOG PPS0    Physical Exam  Vitals reviewed.   Constitutional:       Appearance: Normal appearance. She is normal weight.   HENT:      Head: Normocephalic and atraumatic.   Eyes:      Extraocular Movements: Extraocular movements intact.      Conjunctiva/sclera: Conjunctivae normal.      Pupils: Pupils are equal, round, and reactive to light.   Cardiovascular:      Rate and Rhythm: Normal rate and regular rhythm.   Pulmonary:      Effort: Pulmonary effort is normal.      Breath sounds: Normal breath sounds.   Abdominal:      General: Abdomen is flat. Bowel sounds are normal.      Palpations: Abdomen is soft.   Musculoskeletal:      Cervical back: Normal range of motion.   Neurological:       General: No focal deficit present.      Mental Status: She is alert and oriented to person, place, and time. Mental status is at baseline.   Psychiatric:         Mood and Affect: Mood normal.         Behavior: Behavior normal.         Thought Content: Thought content normal.         Judgment: Judgment normal.         breast exam: right no masses, post radiation changes noted, axilla benign. Left no masses, no nipple discharge and axilla benign.     LABORATORY AND IMAGING STUDIES  Recent Labs   Lab Test 06/23/21  0949 04/23/19  1135 04/24/18  1136 04/25/17  1137 03/28/16  1208    137 137 139 138   POTASSIUM 4.5 4.5 4.2 4.1 4.0   CHLORIDE 102 104 102 102 103   CO2 30 26 25 28 28   ANIONGAP 2* 7 10 9 7   BUN 18 15 14 13 14   CR 0.80 0.60 0.66 0.65 0.62   * 91 98 101* 94   JAIDEN 8.9 9.7 9.3 9.2 8.7     No results for input(s): MAG, PHOS in the last 00554 hours.  Recent Labs   Lab Test 06/23/21  0949 03/28/16  1208   WBC 8.4 6.8   HGB 13.6 14.6    278   MCV 98 98     Recent Labs   Lab Test 06/23/21  0949 03/28/16  1208 05/05/15  1258   BILITOTAL  --  0.4 0.4   ALKPHOS  --  115 124   ALT 43 22 25   AST 30 19 18   ALBUMIN  --  3.8 3.8     No results found for: TSH  No results for input(s): CEA in the last 53862 hours.  Results for orders placed or performed during the hospital encounter of 09/10/21   DX Hip/Pelvis/Spine    Narrative    DX HIP/PELVIS/SPINE  9/10/2021 11:15 AM    HISTORY: Estrogen deficiency    FINDINGS: This DEXA scan was performed using a [employer] scanner.   DEXA results are reported according to T-score.  The T-score is the  standard deviation from the peak bone mass in a normal young adult  population.  In accordance with the ISCD (International Society of  Clinical Densitometry), the lower of the total proximal femur vs  femoral neck T-score is reported.  Osteopenia is defined as a T-score  of -1.0 to -2.5.  Osteoporosis is defined as a T-score of less  than  -2.5.    T-SCORES:  Lumbar Spine L1-L4 T-score: -0.3    Left Hip (Neck) T-score: -0.2  Right Hip (Neck) T-score: 0.0    Hip lowest neck BMD: 1.012 gm/cm2.    No prior studies for comparison.    FRAX 10-YEAR PROBABILITY OF FRACTURE*:  Not applicable because of normal bone mineral density.    *All treatment decisions require clinical judgment and consideration  of individual patient factors which may not be captured in the FRAX  model and the risk of fracture may be over- or under-estimated by  FRAX.      Impression    IMPRESSION: Normal bone mineral density.    ANISHA TATUM MD         SYSTEM ID:  [id number]        ASSESSMENT  AND RECOMMENDATIONS:    1.  T1 cN0 M0 invasive ductal cancer of the right breast status post lumpectomy % positive NM 80% positive HER-2/shari negative via FISH with an Oncotype recurrence score of 11 s/p radiation on arimidex  2. Osteoarthritis  3. Hot flashes      Plan    1. Mammogram today is not yet read, await results  2. Discussed her endocrine therapy and options therein. She is not really interested in tamoxifen given then risk of bleed clots with her Factor V leiden I agree.  We discussed the option of changing to aromasin and discussed this. She is interested in this and I gave her an Rx.  3. RTC 6 months for exam.    Nancy Colón MD on 6/10/2022 at 10:26 AM              Again, thank you for allowing me to participate in the care of your patient.        Sincerely,        Nancy Colón MD

## 2022-06-10 NOTE — PROGRESS NOTES
[facility] PHYSICIANS  MEDICAL ONCOLOGY    RETURN PATIENT VISIT NOTE    Reason for visit: breast cancer      Oncology Treatment Summary  1.  6/24/2021 screening mammogram done an asymmetry with architectural distortion at the 12 o'clock position of the right breast.  Subsequent ultrasound was performed 6/30/2021 which found a 1.2 x 1.1 x 1.2 cm abnormality.  Biopsy was done which found an invasive ductal cancer grade 2 estrogen receptor 100% positive progesterone receptor 80% positive HER-2/shari negative via DONNIE.  2.  On 7/22/2021 she underwent a right lumpectomy for a 1.5 cm grade 2 invasive ductal cancer.  Margins were negative.  2 lymph nodes were removed both of which were negative.  3.  Oncotype was sent off which showed a recurrence score of 11  4. Adjuvant radiation completed 10/8/21  5. Started adjuvant arimidex 10/20/21 - January 2022 stopped due to arthalgias       HISTORY OF PRESENTING ILLNESS  Patient is a very pleasant 66-year-old female who presents today for followup. She tired taking the arimidex for a couple of months but noted she had significant arthralgias in both knees that were quite bothersome and she stopped it. Since then she has noted her aches and pains are better, but still present. She has not had any significant new issues.     PAST MEDICAL HISTORY  Hypertension, carpal tunnel surgery, factor V Leiden heterozygous mutation but no history of blood clots      CURRENT OUTPATIENT MEDICATIONS  Current Outpatient Medications   Medication     anastrozole (ARIMIDEX) 1 MG tablet     hydrochlorothiazide (HYDRODIURIL) 25 MG tablet     lisinopril (ZESTRIL) 10 MG tablet     multivitamin w/minerals (THERA-VIT-M) tablet     simvastatin (ZOCOR) 20 MG tablet     venlafaxine (EFFEXOR) 25 MG tablet     VITAMIN D PO     No current facility-administered medications for this visit.        ALLERGIES     Allergies   Allergen Reactions     Zoloft Rash        SOCIAL HISTORY  She is  and lives  with her  Flynn in [place], no tobacco use although she does smoke 1 pack a day for many years and quit this 1 year ago with the nicotine patch.  No excessive alcohol use she works part-time     FAMILY HISTORY  Mother had breast cancer in her 70s Father had prostate cancer and colon cancer in his 70s and sister was recently diagnosed with a triple negative breast cancer at 68     REVIEW OF SYSTEMS  Review Of Systems  Skin: negative  Eyes: negative  Ears/Nose/Throat: negative  Respiratory: No shortness of breath, dyspnea on exertion, cough, or hemoptysis  Cardiovascular: negative  Gastrointestinal: negative  Genitourinary: negative  Musculoskeletal: negative  Neurologic: negative  Psychiatric: negative  Hematologic/Lymphatic/Immunologic: negative  Endocrine: negative      PHYSICAL EXAM  B/P: 125/81, T: 98.5, P: 95, R: 16  Wt Readings from Last 3 Encounters:   01/07/22 105.7 kg (233 lb)   12/10/21 106.3 kg (234 lb 6.4 oz)   11/12/21 108.3 kg (238 lb 12.8 oz)       ECOG PPS0    Physical Exam  Vitals reviewed.   Constitutional:       Appearance: Normal appearance. She is normal weight.   HENT:      Head: Normocephalic and atraumatic.   Eyes:      Extraocular Movements: Extraocular movements intact.      Conjunctiva/sclera: Conjunctivae normal.      Pupils: Pupils are equal, round, and reactive to light.   Cardiovascular:      Rate and Rhythm: Normal rate and regular rhythm.   Pulmonary:      Effort: Pulmonary effort is normal.      Breath sounds: Normal breath sounds.   Abdominal:      General: Abdomen is flat. Bowel sounds are normal.      Palpations: Abdomen is soft.   Musculoskeletal:      Cervical back: Normal range of motion.   Neurological:      General: No focal deficit present.      Mental Status: She is alert and oriented to person, place, and time. Mental status is at baseline.   Psychiatric:         Mood and Affect: Mood normal.         Behavior: Behavior normal.         Thought Content: Thought  content normal.         Judgment: Judgment normal.         breast exam: right no masses, post radiation changes noted, axilla benign. Left no masses, no nipple discharge and axilla benign.     LABORATORY AND IMAGING STUDIES  Recent Labs   Lab Test 06/23/21  0949 04/23/19  1135 04/24/18  1136 04/25/17  1137 03/28/16  1208    137 137 139 138   POTASSIUM 4.5 4.5 4.2 4.1 4.0   CHLORIDE 102 104 102 102 103   CO2 30 26 25 28 28   ANIONGAP 2* 7 10 9 7   BUN 18 15 14 13 14   CR 0.80 0.60 0.66 0.65 0.62   * 91 98 101* 94   JAIDEN 8.9 9.7 9.3 9.2 8.7     No results for input(s): MAG, PHOS in the last 28168 hours.  Recent Labs   Lab Test 06/23/21  0949 03/28/16  1208   WBC 8.4 6.8   HGB 13.6 14.6    278   MCV 98 98     Recent Labs   Lab Test 06/23/21  0949 03/28/16  1208 05/05/15  1258   BILITOTAL  --  0.4 0.4   ALKPHOS  --  115 124   ALT 43 22 25   AST 30 19 18   ALBUMIN  --  3.8 3.8     No results found for: TSH  No results for input(s): CEA in the last 75312 hours.  Results for orders placed or performed during the hospital encounter of 09/10/21   DX Hip/Pelvis/Spine    Narrative    DX HIP/PELVIS/SPINE  9/10/2021 11:15 AM    HISTORY: Estrogen deficiency    FINDINGS: This DEXA scan was performed using a [employer] scanner.   DEXA results are reported according to T-score.  The T-score is the  standard deviation from the peak bone mass in a normal young adult  population.  In accordance with the ISCD (International Society of  Clinical Densitometry), the lower of the total proximal femur vs  femoral neck T-score is reported.  Osteopenia is defined as a T-score  of -1.0 to -2.5.  Osteoporosis is defined as a T-score of less than  -2.5.    T-SCORES:  Lumbar Spine L1-L4 T-score: -0.3    Left Hip (Neck) T-score: -0.2  Right Hip (Neck) T-score: 0.0    Hip lowest neck BMD: 1.012 gm/cm2.    No prior studies for comparison.    FRAX 10-YEAR PROBABILITY OF FRACTURE*:  Not applicable because of normal bone mineral  density.    *All treatment decisions require clinical judgment and consideration  of individual patient factors which may not be captured in the FRAX  model and the risk of fracture may be over- or under-estimated by  FRAX.      Impression    IMPRESSION: Normal bone mineral density.    ANISHA TATUM MD         SYSTEM ID:  [id number]        ASSESSMENT  AND RECOMMENDATIONS:    1.  T1 cN0 M0 invasive ductal cancer of the right breast status post lumpectomy % positive NV 80% positive HER-2/shari negative via FISH with an Oncotype recurrence score of 11 s/p radiation on arimidex  2. Osteoarthritis  3. Hot flashes      Plan    1. Mammogram today is not yet read, await results  2. Discussed her endocrine therapy and options therein. She is not really interested in tamoxifen given then risk of bleed clots with her Factor V leiden I agree.  We discussed the option of changing to aromasin and discussed this. She is interested in this and I gave her an Rx.  3. RTC 6 months for exam.    Nancy Colón MD on 6/10/2022 at 10:26 AM

## 2022-06-21 ENCOUNTER — OFFICE VISIT (OUTPATIENT)
Dept: SURGERY | Facility: CLINIC | Age: 67
End: 2022-06-21
Payer: COMMERCIAL

## 2022-06-21 VITALS
BODY MASS INDEX: 41.99 KG/M2 | WEIGHT: 237 LBS | TEMPERATURE: 97.5 F | HEIGHT: 63 IN | DIASTOLIC BLOOD PRESSURE: 82 MMHG | SYSTOLIC BLOOD PRESSURE: 122 MMHG

## 2022-06-21 DIAGNOSIS — Z17.0 MALIGNANT NEOPLASM OF UPPER-INNER QUADRANT OF RIGHT BREAST IN FEMALE, ESTROGEN RECEPTOR POSITIVE (H): Primary | ICD-10-CM

## 2022-06-21 DIAGNOSIS — D68.51 FACTOR 5 LEIDEN MUTATION, HETEROZYGOUS (H): ICD-10-CM

## 2022-06-21 DIAGNOSIS — I10 HYPERTENSION GOAL BP (BLOOD PRESSURE) < 140/90: ICD-10-CM

## 2022-06-21 DIAGNOSIS — Z83.2 FAMILY HISTORY OF FACTOR V LEIDEN MUTATION: ICD-10-CM

## 2022-06-21 DIAGNOSIS — R73.03 PRE-DIABETES: ICD-10-CM

## 2022-06-21 DIAGNOSIS — C50.211 MALIGNANT NEOPLASM OF UPPER-INNER QUADRANT OF RIGHT BREAST IN FEMALE, ESTROGEN RECEPTOR POSITIVE (H): Primary | ICD-10-CM

## 2022-06-21 PROCEDURE — 99214 OFFICE O/P EST MOD 30 MIN: CPT | Performed by: SURGERY

## 2022-06-21 ASSESSMENT — PAIN SCALES - GENERAL: PAINLEVEL: NO PAIN (0)

## 2022-06-21 NOTE — PROGRESS NOTES
"  Assessment & Plan   Problem List Items Addressed This Visit        Endocrine    Pre-diabetes       Circulatory    Hypertension goal BP (blood pressure) < 140/90       Hematologic    Factor 5 Leiden mutation, heterozygous (H)       Other    Family history of factor V Leiden mutation    Malignant neoplasm of upper-inner quadrant of right breast in female, estrogen receptor positive (H) - Primary         65 yo F s/p left partial mastectomy with SLNB 7/22/2022 s/p radiation currently on AI.   No new issues  Mammogram June 2022 - BIRAD 2  All of her questions were otherwise answered.  Follow-up in 6 months     25 minutes spent on the date of the encounter doing chart review, history and exam, documentation and further activities per the note       BMI:   Estimated body mass index is 42.66 kg/m  as calculated from the following:    Height as of this encounter: 1.588 m (5' 2.5\").    Weight as of this encounter: 107.5 kg (237 lb).       No follow-ups on file.    [facility] Allan MD  [facility]    Liliana Faustin is a 66 year old who presents for the following health issues:    right breast - IDC, 2/3; DCIS+; ER/MT (+); HER2 (-) 1 oclock 6cm FNAC; 1.2 in greatest cm on US; Factor V lieden - no blood thinner    wire local partial mastectomy on 7/22/2021 - T1cN0' on arimidex since 10/20/21; adj radiation completed 10/8/2021; Oncotype RI 11    Doing well.  No issues.  Does have some joint pain on her bilateral knees.  Stopped taking the arimidex (Feb 2022) due to possible cause of joint pain.  Pt is now started on aromasin.  No new issues.         Review of Systems   Constitutional, HEENT, cardiovascular, pulmonary, GI, , musculoskeletal, neuro, skin, endocrine and psych systems are negative, except as otherwise noted.      Objective    /82   Temp 97.5  F (36.4  C) (Temporal)   Ht 1.588 m (5' 2.5\")   Wt 107.5 kg (237 lb)   BMI 42.66 kg/m    Body mass index is 42.66 kg/m .  Physical " Exam  Vitals reviewed.   HENT:      Head: Normocephalic.   Eyes:      Conjunctiva/sclera: Conjunctivae normal.   Cardiovascular:      Rate and Rhythm: Normal rate.      Pulses: Normal pulses.   Pulmonary:      Effort: Pulmonary effort is normal.   Chest:   Breasts:      Right: No nipple discharge, skin change, tenderness, axillary adenopathy or supraclavicular adenopathy.      Left: No nipple discharge, skin change, tenderness, axillary adenopathy or supraclavicular adenopathy.         Abdominal:      Palpations: Abdomen is soft.   Musculoskeletal:         General: Normal range of motion.      Cervical back: Normal range of motion.   Lymphadenopathy:      Upper Body:      Right upper body: No supraclavicular or axillary adenopathy.      Left upper body: No supraclavicular or axillary adenopathy.   Skin:     General: Skin is warm.      Capillary Refill: Capillary refill takes less than 2 seconds.   Neurological:      General: No focal deficit present.      Mental Status: She is alert.   Psychiatric:         Mood and Affect: Mood normal.

## 2022-06-21 NOTE — LETTER
"    6/21/2022         RE: Roxie Woods  [address] [facility] [address]        Dear Colleague,    Thank you for referring your patient, Roxie Woods, to the [facility]. Please see a copy of my visit note below.      Assessment & Plan   Problem List Items Addressed This Visit        Endocrine    Pre-diabetes       Circulatory    Hypertension goal BP (blood pressure) < 140/90       Hematologic    Factor 5 Leiden mutation, heterozygous (H)       Other    Family history of factor V Leiden mutation    Malignant neoplasm of upper-inner quadrant of right breast in female, estrogen receptor positive (H) - Primary         67 yo F s/p left partial mastectomy with SLNB 7/22/2022 s/p radiation currently on AI.   No new issues  Mammogram June 2022 - BIRAD 2  All of her questions were otherwise answered.  Follow-up in 6 months     25 minutes spent on the date of the encounter doing chart review, history and exam, documentation and further activities per the note       BMI:   Estimated body mass index is 42.66 kg/m  as calculated from the following:    Height as of this encounter: 1.588 m (5' 2.5\").    Weight as of this encounter: 107.5 kg (237 lb).       No follow-ups on file.    Agustín Allan MD  [facility]    Subjective   Roxie is a 66 year old who presents for the following health issues:    right breast - IDC, 2/3; DCIS+; ER/FL (+); HER2 (-) 1 oclock 6cm FNAC; 1.2 in greatest cm on US; Factor V lieden - no blood thinner    wire local partial mastectomy on 7/22/2021 - T1cN0' on arimidex since 10/20/21; adj radiation completed 10/8/2021; Oncotype RI 11    Doing well.  No issues.  Does have some joint pain on her bilateral knees.  Stopped taking the arimidex (Feb 2022) due to possible cause of joint pain.  Pt is now started on aromasin.  No new issues.         Review of Systems   Constitutional, HEENT, cardiovascular, pulmonary, GI, , musculoskeletal, " "neuro, skin, endocrine and psych systems are negative, except as otherwise noted.      Objective    /82   Temp 97.5  F (36.4  C) (Temporal)   Ht 1.588 m (5' 2.5\")   Wt 107.5 kg (237 lb)   BMI 42.66 kg/m    Body mass index is 42.66 kg/m .  Physical Exam  Vitals reviewed.   HENT:      Head: Normocephalic.   Eyes:      Conjunctiva/sclera: Conjunctivae normal.   Cardiovascular:      Rate and Rhythm: Normal rate.      Pulses: Normal pulses.   Pulmonary:      Effort: Pulmonary effort is normal.   Chest:   Breasts:      Right: No nipple discharge, skin change, tenderness, axillary adenopathy or supraclavicular adenopathy.      Left: No nipple discharge, skin change, tenderness, axillary adenopathy or supraclavicular adenopathy.         Abdominal:      Palpations: Abdomen is soft.   Musculoskeletal:         General: Normal range of motion.      Cervical back: Normal range of motion.   Lymphadenopathy:      Upper Body:      Right upper body: No supraclavicular or axillary adenopathy.      Left upper body: No supraclavicular or axillary adenopathy.   Skin:     General: Skin is warm.      Capillary Refill: Capillary refill takes less than 2 seconds.   Neurological:      General: No focal deficit present.      Mental Status: She is alert.   Psychiatric:         Mood and Affect: Mood normal.                    Again, thank you for allowing me to participate in the care of your patient.        Sincerely,        Agustín Allan MD    "

## 2022-07-21 ENCOUNTER — TELEPHONE (OUTPATIENT)
Dept: ONCOLOGY | Facility: CLINIC | Age: 67
End: 2022-07-21

## 2022-07-21 NOTE — TELEPHONE ENCOUNTER
----- Message from Barb Wall RN sent at 7/19/2022  5:00 PM CDT -----  Regarding: RE: any follow up from pt?  Hi Scheduling-    Would you be able to call Roxie and see if she would be interested in touching base with Mitzi in Genetic Counseling again and possibly get her recommended testing done or review her sister's test results?      Thanks so much,   Radha  ----- Message -----  From: Estefany Llamas GC  Sent: 7/19/2022   3:34 PM CDT  To: Barb Wlal RN  Subject: RE: any follow up from pt?                       Thanks for checking in!  I have not heard anything back from Roxie (to the best of my recollection, anyway!  I did also search through my email too but had no luck there either).  I'm happy to reconnect with her anytime, if she does decide that she wants to proceed with testing.  (Or, if she did get her sister's genetic test results and would like to review them.)  Sorry that I don't have a more helpful update!    Take care,  -Mitzi      ----- Message -----  From: Barb Wall RN  Sent: 7/19/2022   2:47 PM CDT  To: Estefany Llamas GC  Subject: any follow up from pt?                           Hi Mitzi-    I am working on Roxie's Cancer Treatment Summary and noted that you saw her for genetic counseling last year, but that she deferred testing until after her sister with triple negative breast cancer completed her testing.  Just wondering if you ever heard from her after that.  It doesn't look like she ever completed her own testing.      Thanks,  Radha Wall, RN-BSN, PHN, OCN  [facility]

## 2022-12-09 ENCOUNTER — ONCOLOGY VISIT (OUTPATIENT)
Dept: ONCOLOGY | Facility: CLINIC | Age: 67
End: 2022-12-09
Attending: INTERNAL MEDICINE
Payer: COMMERCIAL

## 2022-12-09 VITALS
OXYGEN SATURATION: 98 % | DIASTOLIC BLOOD PRESSURE: 84 MMHG | HEART RATE: 109 BPM | SYSTOLIC BLOOD PRESSURE: 146 MMHG | TEMPERATURE: 98.8 F

## 2022-12-09 DIAGNOSIS — E78.5 HYPERLIPIDEMIA LDL GOAL <130: ICD-10-CM

## 2022-12-09 DIAGNOSIS — F32.5 MAJOR DEPRESSION IN REMISSION (H): ICD-10-CM

## 2022-12-09 DIAGNOSIS — Z12.31 ENCOUNTER FOR SCREENING MAMMOGRAM FOR MALIGNANT NEOPLASM OF BREAST: Primary | ICD-10-CM

## 2022-12-09 DIAGNOSIS — Z23 NEED FOR PROPHYLACTIC VACCINATION AND INOCULATION AGAINST INFLUENZA: ICD-10-CM

## 2022-12-09 PROCEDURE — 99213 OFFICE O/P EST LOW 20 MIN: CPT | Mod: 25 | Performed by: INTERNAL MEDICINE

## 2022-12-09 PROCEDURE — 90662 IIV NO PRSV INCREASED AG IM: CPT | Performed by: INTERNAL MEDICINE

## 2022-12-09 PROCEDURE — G0008 ADMIN INFLUENZA VIRUS VAC: HCPCS | Performed by: INTERNAL MEDICINE

## 2022-12-09 RX ORDER — SIMVASTATIN 20 MG
TABLET ORAL
Qty: 90 TABLET | Refills: 3 | Status: SHIPPED | OUTPATIENT
Start: 2022-12-09 | End: 2024-01-02

## 2022-12-09 RX ORDER — VENLAFAXINE 75 MG/1
75 TABLET ORAL 2 TIMES DAILY
Qty: 180 TABLET | Refills: 3 | Status: SHIPPED | OUTPATIENT
Start: 2022-12-09 | End: 2024-01-02

## 2022-12-09 ASSESSMENT — PAIN SCALES - GENERAL: PAINLEVEL: NO PAIN (0)

## 2022-12-09 NOTE — LETTER
12/9/2022         RE: Roxie Woods  [address] [facility] [address]        Dear Colleague,    Thank you for referring your patient, Roxie Woods, to the [facility]. Please see a copy of my visit note below.    [facility] PHYSICIANS  MEDICAL ONCOLOGY    RETURN PATIENT VISIT NOTE    Reason for visit: breast cancer    Oncology Treatment Summary  1.  6/24/2021 screening mammogram done an asymmetry with architectural distortion at the 12 o'clock position of the right breast.  Subsequent ultrasound was performed 6/30/2021 which found a 1.2 x 1.1 x 1.2 cm abnormality.  Biopsy was done which found an invasive ductal cancer grade 2 estrogen receptor 100% positive progesterone receptor 80% positive HER-2/shari negative via DONNIE.  2.  On 7/22/2021 she underwent a right lumpectomy for a 1.5 cm grade 2 invasive ductal cancer.  Margins were negative.  2 lymph nodes were removed both of which were negative.  3.  Oncotype was sent off which showed a recurrence score of 11  4. Adjuvant radiation completed 10/8/21  5. Started adjuvant arimidex 10/20/21 - January 2022 stopped due to arthalgias  6. June 2022 started exemestane       HISTORY OF PRESENTING ILLNESS  Patient is a very pleasant 67-year-old female who presents today for followup. She is doing better since changing to exemestane. She still has hot flashes that are bothersome despite her effexor but no other complaints. No n/v/d/c. She would like a flu shot today.     PAST MEDICAL HISTORY  Hypertension, carpal tunnel surgery, factor V Leiden heterozygous mutation but no history of blood clots      CURRENT OUTPATIENT MEDICATIONS  Current Outpatient Medications   Medication     anastrozole (ARIMIDEX) 1 MG tablet     exemestane (AROMASIN) 25 MG tablet     hydrochlorothiazide (HYDRODIURIL) 25 MG tablet     lisinopril (ZESTRIL) 10 MG tablet     multivitamin w/minerals (THERA-VIT-M) tablet     simvastatin (ZOCOR)  20 MG tablet     venlafaxine (EFFEXOR) 25 MG tablet     VITAMIN D PO     No current facility-administered medications for this visit.        ALLERGIES     Allergies   Allergen Reactions     Zoloft Rash        SOCIAL HISTORY  She is  and lives with her  Flynn in [place], no tobacco use although she does smoke 1 pack a day for many years and quit this 1 year ago with the nicotine patch.  No excessive alcohol use she works part-time     FAMILY HISTORY  Mother had breast cancer in her 70s Father had prostate cancer and colon cancer in his 70s and sister was recently diagnosed with a triple negative breast cancer at 68     REVIEW OF SYSTEMS  Review Of Systems  Skin: negative  Eyes: negative  Ears/Nose/Throat: negative  Respiratory: No shortness of breath, dyspnea on exertion, cough, or hemoptysis  Cardiovascular: negative  Gastrointestinal: negative  Genitourinary: negative  Musculoskeletal: negative  Neurologic: negative  Psychiatric: negative  Hematologic/Lymphatic/Immunologic: negative  Endocrine: negative      PHYSICAL EXAM  B/P: 125/81, T: 98.5, P: 95, R: 16  Wt Readings from Last 3 Encounters:   06/21/22 107.5 kg (237 lb)   01/07/22 105.7 kg (233 lb)   12/10/21 106.3 kg (234 lb 6.4 oz)       ECOG PPS0    Physical Exam  Vitals reviewed.   Constitutional:       Appearance: Normal appearance. She is normal weight.   HENT:      Head: Normocephalic and atraumatic.   Eyes:      Extraocular Movements: Extraocular movements intact.      Conjunctiva/sclera: Conjunctivae normal.      Pupils: Pupils are equal, round, and reactive to light.   Cardiovascular:      Rate and Rhythm: Normal rate and regular rhythm.   Pulmonary:      Effort: Pulmonary effort is normal.      Breath sounds: Normal breath sounds.   Abdominal:      General: Abdomen is flat. Bowel sounds are normal.      Palpations: Abdomen is soft.   Musculoskeletal:      Cervical back: Normal range of motion.   Neurological:      General: No focal  deficit present.      Mental Status: She is alert and oriented to person, place, and time. Mental status is at baseline.   Psychiatric:         Mood and Affect: Mood normal.         Behavior: Behavior normal.         Thought Content: Thought content normal.         Judgment: Judgment normal.         breast exam: right no masses, post radiation changes noted, axilla benign. Left no masses, no nipple discharge and axilla benign.     LABORATORY AND IMAGING STUDIES  Recent Labs   Lab Test 06/23/21  0949 04/23/19  1135 04/24/18  1136 04/25/17  1137 03/28/16  1208    137 137 139 138   POTASSIUM 4.5 4.5 4.2 4.1 4.0   CHLORIDE 102 104 102 102 103   CO2 30 26 25 28 28   ANIONGAP 2* 7 10 9 7   BUN 18 15 14 13 14   CR 0.80 0.60 0.66 0.65 0.62   * 91 98 101* 94   JAIDEN 8.9 9.7 9.3 9.2 8.7     No results for input(s): MAG, PHOS in the last 76880 hours.  Recent Labs   Lab Test 06/23/21  0949 03/28/16  1208   WBC 8.4 6.8   HGB 13.6 14.6    278   MCV 98 98     Recent Labs   Lab Test 06/23/21  0949 03/28/16  1208 05/05/15  1258   BILITOTAL  --  0.4 0.4   ALKPHOS  --  115 124   ALT 43 22 25   AST 30 19 18   ALBUMIN  --  3.8 3.8     No results found for: TSH  No results for input(s): CEA in the last 84440 hours.  Results for orders placed or performed in visit on 06/10/22   MA Screen Bilateral w/Akin    Narrative    BILATERAL FULL FIELD DIGITAL SCREENING MAMMOGRAM WITH TOMOSYNTHESIS    Performed on: 6/10/22    Compared to: 07/22/2021, 07/22/2021, 07/22/2021, 07/22/2021, 06/30/2021,   06/30/2021, 06/30/2021, 06/24/2021, 05/28/2020, 05/02/2019, 08/09/2016,   09/18/2013, 09/14/2011, and     Technique:  This study was evaluated with the assistance of Computer-Aided   Detection.  Breast Tomosynthesis was used in interpretation.    Findings: The breasts have scattered areas of fibroglandular density.    There are breast conservation changes in the right breast.  There is no radiographic evidence of malignancy.      IMPRESSION: ACR BI-RADS Category 2: Benign    RECOMMENDED FOLLOW-UP: Annual routine screening mammogram    The results and recommendations of this examination will be communicated   to the patient.    I have personally reviewed the examination and initial interpretation  and I agree with the findings.        ASSESSMENT  AND RECOMMENDATIONS:    1.  T1 cN0 M0 invasive ductal cancer of the right breast status post lumpectomy % positive RI 80% positive HER-2/shari negative via FISH with an Oncotype recurrence score of 11 s/p radiation on arimidex  2. Osteoarthritis  3. Hot flashes      Plan    1. Mammogram due in june  2. Continue aromasin  3. Refilled effexor with increased dose to 75mg bid  4. See me in 6 months for exam and mammogram  5. Discussed doing a colonoscopy but she declines for now  6. dexa due fall 2023.    Nancy Colón MD on 12/9/2022 at 10:50 AM                Again, thank you for allowing me to participate in the care of your patient.        Sincerely,        Nancy Colón MD

## 2022-12-09 NOTE — PROGRESS NOTES
[facility]  MEDICAL ONCOLOGY    RETURN PATIENT VISIT NOTE    Reason for visit: breast cancer    Oncology Treatment Summary  1.  6/24/2021 screening mammogram done an asymmetry with architectural distortion at the 12 o'clock position of the right breast.  Subsequent ultrasound was performed 6/30/2021 which found a 1.2 x 1.1 x 1.2 cm abnormality.  Biopsy was done which found an invasive ductal cancer grade 2 estrogen receptor 100% positive progesterone receptor 80% positive HER-2/shari negative via DONNIE.  2.  On 7/22/2021 she underwent a right lumpectomy for a 1.5 cm grade 2 invasive ductal cancer.  Margins were negative.  2 lymph nodes were removed both of which were negative.  3.  Oncotype was sent off which showed a recurrence score of 11  4. Adjuvant radiation completed 10/8/21  5. Started adjuvant arimidex 10/20/21 - January 2022 stopped due to arthalgias  6. June 2022 started exemestane       HISTORY OF PRESENTING ILLNESS  Patient is a very pleasant 67-year-old female who presents today for followup. She is doing better since changing to exemestane. She still has hot flashes that are bothersome despite her effexor but no other complaints. No n/v/d/c. She would like a flu shot today.     PAST MEDICAL HISTORY  Hypertension, carpal tunnel surgery, factor V Leiden heterozygous mutation but no history of blood clots      CURRENT OUTPATIENT MEDICATIONS  Current Outpatient Medications   Medication     anastrozole (ARIMIDEX) 1 MG tablet     exemestane (AROMASIN) 25 MG tablet     hydrochlorothiazide (HYDRODIURIL) 25 MG tablet     lisinopril (ZESTRIL) 10 MG tablet     multivitamin w/minerals (THERA-VIT-M) tablet     simvastatin (ZOCOR) 20 MG tablet     venlafaxine (EFFEXOR) 25 MG tablet     VITAMIN D PO     No current facility-administered medications for this visit.        ALLERGIES     Allergies   Allergen Reactions     Zoloft Rash        SOCIAL HISTORY  She is  and lives with her  Flynn  in [place], no tobacco use although she does smoke 1 pack a day for many years and quit this 1 year ago with the nicotine patch.  No excessive alcohol use she works part-time     FAMILY HISTORY  Mother had breast cancer in her 70s Father had prostate cancer and colon cancer in his 70s and sister was recently diagnosed with a triple negative breast cancer at 68     REVIEW OF SYSTEMS  Review Of Systems  Skin: negative  Eyes: negative  Ears/Nose/Throat: negative  Respiratory: No shortness of breath, dyspnea on exertion, cough, or hemoptysis  Cardiovascular: negative  Gastrointestinal: negative  Genitourinary: negative  Musculoskeletal: negative  Neurologic: negative  Psychiatric: negative  Hematologic/Lymphatic/Immunologic: negative  Endocrine: negative      PHYSICAL EXAM  B/P: 125/81, T: 98.5, P: 95, R: 16  Wt Readings from Last 3 Encounters:   06/21/22 107.5 kg (237 lb)   01/07/22 105.7 kg (233 lb)   12/10/21 106.3 kg (234 lb 6.4 oz)       ECOG PPS0    Physical Exam  Vitals reviewed.   Constitutional:       Appearance: Normal appearance. She is normal weight.   HENT:      Head: Normocephalic and atraumatic.   Eyes:      Extraocular Movements: Extraocular movements intact.      Conjunctiva/sclera: Conjunctivae normal.      Pupils: Pupils are equal, round, and reactive to light.   Cardiovascular:      Rate and Rhythm: Normal rate and regular rhythm.   Pulmonary:      Effort: Pulmonary effort is normal.      Breath sounds: Normal breath sounds.   Abdominal:      General: Abdomen is flat. Bowel sounds are normal.      Palpations: Abdomen is soft.   Musculoskeletal:      Cervical back: Normal range of motion.   Neurological:      General: No focal deficit present.      Mental Status: She is alert and oriented to person, place, and time. Mental status is at baseline.   Psychiatric:         Mood and Affect: Mood normal.         Behavior: Behavior normal.         Thought Content: Thought content normal.         Judgment:  Judgment normal.         breast exam: right no masses, post radiation changes noted, axilla benign. Left no masses, no nipple discharge and axilla benign.     LABORATORY AND IMAGING STUDIES  Recent Labs   Lab Test 06/23/21  0949 04/23/19  1135 04/24/18  1136 04/25/17  1137 03/28/16  1208    137 137 139 138   POTASSIUM 4.5 4.5 4.2 4.1 4.0   CHLORIDE 102 104 102 102 103   CO2 30 26 25 28 28   ANIONGAP 2* 7 10 9 7   BUN 18 15 14 13 14   CR 0.80 0.60 0.66 0.65 0.62   * 91 98 101* 94   JAIDEN 8.9 9.7 9.3 9.2 8.7     No results for input(s): MAG, PHOS in the last 86733 hours.  Recent Labs   Lab Test 06/23/21  0949 03/28/16  1208   WBC 8.4 6.8   HGB 13.6 14.6    278   MCV 98 98     Recent Labs   Lab Test 06/23/21  0949 03/28/16  1208 05/05/15  1258   BILITOTAL  --  0.4 0.4   ALKPHOS  --  115 124   ALT 43 22 25   AST 30 19 18   ALBUMIN  --  3.8 3.8     No results found for: TSH  No results for input(s): CEA in the last 54573 hours.  Results for orders placed or performed in visit on 06/10/22   MA Screen Bilateral w/Akin    Narrative    BILATERAL FULL FIELD DIGITAL SCREENING MAMMOGRAM WITH TOMOSYNTHESIS    Performed on: 6/10/22    Compared to: 07/22/2021, 07/22/2021, 07/22/2021, 07/22/2021, 06/30/2021,   06/30/2021, 06/30/2021, 06/24/2021, 05/28/2020, 05/02/2019, 08/09/2016,   09/18/2013, 09/14/2011, and     Technique:  This study was evaluated with the assistance of Computer-Aided   Detection.  Breast Tomosynthesis was used in interpretation.    Findings: The breasts have scattered areas of fibroglandular density.    There are breast conservation changes in the right breast.  There is no radiographic evidence of malignancy.     IMPRESSION: ACR BI-RADS Category 2: Benign    RECOMMENDED FOLLOW-UP: Annual routine screening mammogram    The results and recommendations of this examination will be communicated   to the patient.    I have personally reviewed the examination and initial interpretation  and I agree  with the findings.        ASSESSMENT  AND RECOMMENDATIONS:    1.  T1 cN0 M0 invasive ductal cancer of the right breast status post lumpectomy % positive OH 80% positive HER-2/shari negative via FISH with an Oncotype recurrence score of 11 s/p radiation on arimidex  2. Osteoarthritis  3. Hot flashes      Plan    1. Mammogram due in june 2. Continue aromasin  3. Refilled effexor with increased dose to 75mg bid  4. See me in 6 months for exam and mammogram  5. Discussed doing a colonoscopy but she declines for now  6. dexa due fall 2023.    Nancy Colón MD on 12/9/2022 at 10:50 AM

## 2022-12-09 NOTE — NURSING NOTE
"Oncology Rooming Note    December 9, 2022 10:29 AM   Roxie Woods is a 67 year old female who presents for:    Chief Complaint   Patient presents with     Oncology Clinic Visit     6 month f/u     Initial Vitals: There were no vitals taken for this visit. Estimated body mass index is 42.66 kg/m  as calculated from the following:    Height as of 6/21/22: 1.588 m (5' 2.5\").    Weight as of 6/21/22: 107.5 kg (237 lb). There is no height or weight on file to calculate BSA.  Data Unavailable Comment: Data Unavailable   No LMP recorded. Patient is postmenopausal.  Allergies reviewed: Yes  Medications reviewed: Yes    Medications: MEDICATION REFILLS NEEDED TODAY. Provider was notified.  Pharmacy name entered into [facility]: [facility] [address] [place] [address] [id number] [place] [address]    Clinical concerns: none Dr Colón was notified.      Jeff Tapia RN              "

## 2022-12-20 ENCOUNTER — OFFICE VISIT (OUTPATIENT)
Dept: SURGERY | Facility: CLINIC | Age: 67
End: 2022-12-20
Payer: COMMERCIAL

## 2022-12-20 VITALS
WEIGHT: 240 LBS | BODY MASS INDEX: 42.52 KG/M2 | HEIGHT: 63 IN | DIASTOLIC BLOOD PRESSURE: 82 MMHG | TEMPERATURE: 97.5 F | SYSTOLIC BLOOD PRESSURE: 122 MMHG

## 2022-12-20 DIAGNOSIS — Z17.0 MALIGNANT NEOPLASM OF UPPER-INNER QUADRANT OF RIGHT BREAST IN FEMALE, ESTROGEN RECEPTOR POSITIVE (H): ICD-10-CM

## 2022-12-20 DIAGNOSIS — D68.51 FACTOR 5 LEIDEN MUTATION, HETEROZYGOUS (H): ICD-10-CM

## 2022-12-20 DIAGNOSIS — Z90.11 S/P PARTIAL MASTECTOMY, RIGHT: Primary | ICD-10-CM

## 2022-12-20 DIAGNOSIS — C50.211 MALIGNANT NEOPLASM OF UPPER-INNER QUADRANT OF RIGHT BREAST IN FEMALE, ESTROGEN RECEPTOR POSITIVE (H): ICD-10-CM

## 2022-12-20 DIAGNOSIS — E66.01 MORBID OBESITY WITH BMI OF 40.0-44.9, ADULT (H): ICD-10-CM

## 2022-12-20 PROCEDURE — 99214 OFFICE O/P EST MOD 30 MIN: CPT | Performed by: SURGERY

## 2022-12-20 ASSESSMENT — PAIN SCALES - GENERAL: PAINLEVEL: NO PAIN (0)

## 2022-12-20 NOTE — PROGRESS NOTES
"  Assessment & Plan     S/P partial mastectomy, right    Malignant neoplasm of upper-inner quadrant of right breast in female, estrogen receptor positive (H)    Factor 5 Leiden mutation, heterozygous (H)    Morbid obesity with BMI of 40.0-44.9, adult (H)      Patient is doing well 1.5 years s/p right partial mastectomy. S/p adjuvant radiation and tolerating AI well. No breast or skin changes. No new concerns at this time.       BMI:   Estimated body mass index is 43.2 kg/m  as calculated from the following:    Height as of this encounter: 1.588 m (5' 2.5\").    Weight as of this encounter: 108.9 kg (240 lb).       FUTURE APPOINTMENTS:       - Follow-up visit in 6 months    No follow-ups on file.     Berta Salazar, MS3  [facility] [employer]    Provider Disclosure:  I agree with above History, Review of Systems, Physical exam and Plan. I have reviewed the content of the documentation and have edited it as needed. I have personally performed the services documented here and the documentation accurately represents those services and the decisions I have made.    JustenNils Allan MD  [facility]    Liliana Faustin is a 67 year old woman presenting for the following health issues:  RECHECK      1.5 years s/p right partial mastectomy with SLNB for ER/UT+ HER 2- IDC and DCIS  S/p adjuvant radiation  Tolerating AI well  No breast or skin changes or nipple discharge     Doing well.  No issues. Stopped taking the arimidex (Feb 2022) due to possible cause of joint pain and has been doing very well on Exemestane.  No new issues    Review of Systems   N/A        Objective    /82   Temp 97.5  F (36.4  C) (Temporal)   Ht 1.588 m (5' 2.5\")   Wt 108.9 kg (240 lb)   BMI 43.20 kg/m    Body mass index is 43.2 kg/m .  Physical Exam   Breast: no nipple discharge, tenderness, or axillary adenopathy bilaterally. No supraclavicular adenopathy bilaterally. No skin changes or shrinkage post " radiation

## 2022-12-20 NOTE — LETTER
"    12/20/2022         RE: Roxie Woods  [address] [facility] [address]        Dear Colleague,    Thank you for referring your patient, Roxie Woods, to the [facility]. Please see a copy of my visit note below.      Assessment & Plan     S/P partial mastectomy, right    Malignant neoplasm of upper-inner quadrant of right breast in female, estrogen receptor positive (H)    Factor 5 Leiden mutation, heterozygous (H)    Morbid obesity with BMI of 40.0-44.9, adult (H)      Patient is doing well 1.5 years s/p right partial mastectomy. S/p adjuvant radiation and tolerating AI well. No breast or skin changes. No new concerns at this time.       BMI:   Estimated body mass index is 43.2 kg/m  as calculated from the following:    Height as of this encounter: 1.588 m (5' 2.5\").    Weight as of this encounter: 108.9 kg (240 lb).       FUTURE APPOINTMENTS:       - Follow-up visit in 6 months    No follow-ups on file.     Berta Salazar, MS3  [facility] [employer]    Provider Disclosure:  I agree with above History, Review of Systems, Physical exam and Plan. I have reviewed the content of the documentation and have edited it as needed. I have personally performed the services documented here and the documentation accurately represents those services and the decisions I have made.    Agustín Allan MD  [facility]    Liliana Faustin is a 67 year old woman presenting for the following health issues:  RECHECK      1.5 years s/p right partial mastectomy with SLNB for ER/AZ+ HER 2- IDC and DCIS  S/p adjuvant radiation  Tolerating AI well  No breast or skin changes or nipple discharge     Doing well.  No issues. Stopped taking the arimidex (Feb 2022) due to possible cause of joint pain and has been doing very well on Exemestane.  No new issues    Review of Systems   N/A       Objective    /82   Temp 97.5  F (36.4  C) (Temporal)   Ht " "1.588 m (5' 2.5\")   Wt 108.9 kg (240 lb)   BMI 43.20 kg/m    Body mass index is 43.2 kg/m .  Physical Exam   Breast: no nipple discharge, tenderness, or axillary adenopathy bilaterally. No supraclavicular adenopathy bilaterally. No skin changes or shrinkage post radiation                   Again, thank you for allowing me to participate in the care of your patient.        Sincerely,        Agustín Allan MD    "

## 2023-01-11 ENCOUNTER — TELEPHONE (OUTPATIENT)
Dept: ONCOLOGY | Facility: CLINIC | Age: 68
End: 2023-01-11

## 2023-02-06 PROBLEM — C50.211 MALIGNANT NEOPLASM OF UPPER-INNER QUADRANT OF RIGHT BREAST IN FEMALE, ESTROGEN RECEPTOR POSITIVE (H): Status: ACTIVE | Noted: 2021-07-13

## 2023-02-06 PROBLEM — Z17.0 MALIGNANT NEOPLASM OF UPPER-INNER QUADRANT OF RIGHT BREAST IN FEMALE, ESTROGEN RECEPTOR POSITIVE (H): Status: ACTIVE | Noted: 2021-07-13

## 2023-03-17 NOTE — PROGRESS NOTES
{PROVIDER CHARTING PREFERENCE:372683}    Subjective   Roxie is a 67 year old, presenting for the following health issues:  No chief complaint on file.      History of Present Illness       Reason for visit:  Clicking sound in my thumbs painful hands and right shoulder  Symptom onset:  More than a month  Symptoms include:  Pain  Symptom intensity:  Moderate  Symptom progression:  Staying the same  Had these symptoms before:  No    She eats 0-1 servings of fruits and vegetables daily.She consumes 1 sweetened beverage(s) daily.She exercises with enough effort to increase her heart rate 30 to 60 minutes per day.  She exercises with enough effort to increase her heart rate 7 days per week.   She is taking medications regularly.       {SUPERLIST (Optional):813301}  {additonal problems for provider to add (Optional):254803}    Review of Systems   {ROS COMP (Optional):640434}      Objective    There were no vitals taken for this visit.  There is no height or weight on file to calculate BMI.  Physical Exam   {Exam List (Optional):838394}    {Diagnostic Test Results (Optional):132921}    {AMBULATORY ATTESTATION (Optional):738703}

## 2023-03-20 ENCOUNTER — OFFICE VISIT (OUTPATIENT)
Dept: FAMILY MEDICINE | Facility: OTHER | Age: 68
End: 2023-03-20
Payer: COMMERCIAL

## 2023-03-20 VITALS
SYSTOLIC BLOOD PRESSURE: 120 MMHG | DIASTOLIC BLOOD PRESSURE: 80 MMHG | OXYGEN SATURATION: 96 % | HEIGHT: 63 IN | HEART RATE: 102 BPM | WEIGHT: 239.5 LBS | TEMPERATURE: 97 F | RESPIRATION RATE: 18 BRPM | BODY MASS INDEX: 42.44 KG/M2

## 2023-03-20 DIAGNOSIS — Z17.0 MALIGNANT NEOPLASM OF UPPER-INNER QUADRANT OF RIGHT BREAST IN FEMALE, ESTROGEN RECEPTOR POSITIVE (H): ICD-10-CM

## 2023-03-20 DIAGNOSIS — M77.8 TENDINITIS OF RIGHT SHOULDER: ICD-10-CM

## 2023-03-20 DIAGNOSIS — M18.10 OSTEOARTHRITIS OF THUMB, UNSPECIFIED LATERALITY: Primary | ICD-10-CM

## 2023-03-20 DIAGNOSIS — E66.01 MORBID OBESITY WITH BMI OF 40.0-44.9, ADULT (H): ICD-10-CM

## 2023-03-20 DIAGNOSIS — C50.211 MALIGNANT NEOPLASM OF UPPER-INNER QUADRANT OF RIGHT BREAST IN FEMALE, ESTROGEN RECEPTOR POSITIVE (H): ICD-10-CM

## 2023-03-20 DIAGNOSIS — M65.30 TRIGGER FINGER, ACQUIRED: ICD-10-CM

## 2023-03-20 PROCEDURE — 99214 OFFICE O/P EST MOD 30 MIN: CPT | Performed by: PHYSICIAN ASSISTANT

## 2023-03-20 RX ORDER — PREDNISONE 20 MG/1
TABLET ORAL
Qty: 11 TABLET | Refills: 0 | Status: SHIPPED | OUTPATIENT
Start: 2023-03-20 | End: 2023-08-29

## 2023-03-20 ASSESSMENT — PAIN SCALES - GENERAL: PAINLEVEL: MODERATE PAIN (5)

## 2023-03-20 NOTE — PROGRESS NOTES
Assessment & Plan     Osteoarthritis of thumb, unspecified laterality  Patient has been experiencing pain in the bilateral thumbs, L>R for 1+ months. She cannot recall injury or activity which triggered the pain. Feels that it is worse with activity and causes a burning sensation in the thenar muscles. Positive thumb grind test on exam. Patient already using braces at night, though she did not bring these with. I will provide her with a short taper of prednisone and educated on the importance of ice/heat, warm water soaks, activity as tolerated and use of tylenol. Can consider OT if not improving. Orthopedic referral due to reports of what sounds to be trigger finger in left thumb.   - predniSONE (DELTASONE) 20 MG tablet; 2 tabs daily x 3 days, then 1 tab daily x 3 days, then 1/2 tab daily x 3 days (take with food)  - Orthopedic  Referral; Future    Trigger finger, acquired  Patient reports that her left thumb catches in the PIP joint when flexed. She says that she has to manually extend it when this occurs. Reviewed massage, ice/heat and will see if course of steroids reduces suspected inflammation enough to calm this down. Orthopedic referral with instruction to schedule several weeks from now and cancel if improving. She was receptive to this plan.   - predniSONE (DELTASONE) 20 MG tablet; 2 tabs daily x 3 days, then 1 tab daily x 3 days, then 1/2 tab daily x 3 days (take with food)  - Orthopedic  Referral; Future    Tendinitis of right shoulder  Chronic pains in the right shoulder. Could not recall any recent injury or activity which may have caused flare. States that she is a side sleeper. Notices that the pains are mostly with overhead movements. On exam normal AROM in joint, but pain with resisted flexion and abduction. Spasmed trapezius and rhomboid as well. Reviewed upper back and shoulder stretches the patient can practice. PT referral if not improving as expected.   - predniSONE  "(DELTASONE) 20 MG tablet; 2 tabs daily x 3 days, then 1 tab daily x 3 days, then 1/2 tab daily x 3 days (take with food)  - Physical Therapy Referral; Future    Morbid obesity with BMI of 40.0-44.9, adult (H)  Patient was given education on benefits of weight loss in relation to reducing risk for OA in knees and ankles.     Malignant neoplasm of upper-inner quadrant of right breast in female, estrogen receptor positive (H)  Patient is working with oncologist to find alternative therapy as the aromasin she had been taking now is too expensive.        Return in about 6 months (around 9/20/2023) for Return for scheduled annual checkup with PCP.    ROLDAN Rivera [facility] DOV Faustin is a 67 year old, presenting for the following health issues:  Thumb Discomfort      History of Present Illness       Reason for visit:  Clicking sound in my thumbs painful hands and right shoulder  Symptom onset:  More than a month  Symptoms include:  Pain  Symptom intensity:  Moderate  Symptom progression:  Staying the same  Had these symptoms before:  No    She eats 0-1 servings of fruits and vegetables daily.She consumes 1 sweetened beverage(s) daily.She exercises with enough effort to increase her heart rate 30 to 60 minutes per day.  She exercises with enough effort to increase her heart rate 7 days per week.   She is taking medications regularly.    Today's PHQ-9         PHQ-9 Total Score: 0    PHQ-9 Q9 Thoughts of better off dead/self-harm past 2 weeks :   Not at all    How difficult have these problems made it for you to do your work, take care of things at home, or get along with other people: Not difficult at all     Review of Systems   Constitutional, HEENT, cardiovascular, pulmonary, gi and gu systems are negative, except as otherwise noted.      Objective    /80   Pulse 102   Temp 97  F (36.1  C) (Temporal)   Resp 18   Ht 1.588 m (5' 2.52\")   Wt 108.6 kg (239 lb 8 oz)  "  SpO2 96%   BMI 43.08 kg/m    Body mass index is 43.08 kg/m .  Physical Exam   GENERAL: healthy, alert and no distress  NECK: no adenopathy, no asymmetry, masses, or scars and thyroid normal to palpation  RESP: lungs clear to auscultation - no rales, rhonchi or wheezes  CV: regular rate and rhythm, normal S1 S2, no S3 or S4, no murmur, click or rub, no peripheral edema and peripheral pulses strong  MS:   Thumbs: Normal AROM of elbows/wrist and fingers  Limited AROM in flexion of the thumbs bilaterally, normal PROM of these joints  Mild edema around thumb joints bilaterally, without bogginess or tenderness to palpation  Positive thumb grind test bilaterally  Negative finkelstein maneuver    Right shoulder: Normal AROM of neck and shoulders  4/5 strength to resisted abduction and flexion in the right shoulder, this was attributed to pain  Spasmed, nontender, trapezius and rhomboid  Neg empty can, castillo and crossover   PSYCH: mentation appears normal, affect normal/bright

## 2023-03-28 ENCOUNTER — PATIENT OUTREACH (OUTPATIENT)
Dept: ONCOLOGY | Facility: CLINIC | Age: 68
End: 2023-03-28
Payer: COMMERCIAL

## 2023-03-28 NOTE — PROGRESS NOTES
Call placed to patient to follow up on phone call she made to scheduling reporting that she is going to stop taking the Exemestane/Aromasin due to cost. Message and contact number left to return call.

## 2023-03-29 NOTE — PROGRESS NOTES
[facility]: Cancer Care Note                                    Situation:      Received voicemail message from patient, calling to provide update to Dr. Colón that she decided to stop taking her exemestane (Aromasin) approximately 3 weeks ago, due to high cost.     Background:                                                      Patient with diagnosis of Stage IA invasive ductal cancer of the right breast, who is status-post lumpectomy (07/2021).  Her cancer was ER/LA+, HER-2/shari negative via FISH, with an Oncotype recurrence score of 11.  Patient is also status-post radiation treatment (completed in 10/2021).      Patient started adjuvant Arimidex in 10/2021.  Treatment was changed to Aromasin in 06/2022 due to side effects.  Per previous chart notes, patient was not interested in Tamoxifen given her heightened risk of blood clots with her diagnosis of Factor V Leiden mutation.    Assessment:                                                      Patient shares that she had decided to stop taking the Aromasin approximately 3 weeks ago, as she just could not afford the cost.  She states it would have cost her $350 for a 3-month supply (~$116/month).  She wanted Dr. Colón to be aware.    Writer reached out to Oncology Pharmacy Liaison, Jaz Mack, to find out if there are any financial assistance programs available for the Aromasin.  Unfortunately, there aren't any assistance options available at this time.  Also, since patient has Medicare coverage, she would not be eligible for any discount programs such as GoodRx.      Plan / Recommendations:                                                       Note routed to Dr. Colón and Hoda Travis CNP, to review and provide recommendations.    Austin Farr, RN, BSN, OCN  RN Care Coordinator - Oncology  [facility]

## 2023-04-12 ENCOUNTER — TELEPHONE (OUTPATIENT)
Dept: FAMILY MEDICINE | Facility: CLINIC | Age: 68
End: 2023-04-12
Payer: COMMERCIAL

## 2023-04-12 NOTE — LETTER
April 12, 2023    Roxie Woods  [address]   [place] [address]    Dear Roxie,    At [facility] we care about your health and are committed to providing quality patient care.     Here is a list of Health Maintenance topics that are due now or due soon:  Health Maintenance Due   Topic Date Due    ZOSTER IMMUNIZATION (1 of 2) Never done    LUNG CANCER SCREENING  Never done    ADVANCE CARE PLANNING  12/18/2017    Pneumococcal Vaccine: 65+ Years (2 - PCV) 04/24/2019    MEDICARE ANNUAL WELLNESS VISIT  07/01/2021    BMP  06/23/2022    MICROALBUMIN  06/23/2022    COLORECTAL CANCER SCREENING  06/23/2022    ANNUAL REVIEW OF HM ORDERS  07/19/2022    COVID-19 Vaccine (5 - Booster for Moderna series) 09/01/2022        We are recommending that you:  Schedule a COLONOSCOPY to assess for colon cancer (due every 10 years or 5 years in higher risk situations.)       Colon cancer is now the second leading cause of cancer-related deaths in the [place] for both men and women and there are over 130,000 new cases and 50,000 deaths per year from colon cancer.  Colonoscopies can prevent 90-95% of these deaths.  Problem lesions can be removed before they ever become cancer.  This test is not only looking for cancer, but also getting rid of precancerious lesions.    If you are under/uninsured, we recommend you contact the Reactivitys program. Reactivitys is a free colorectal cancer screening program that provides colonoscopies for eligible under/uninsured [place] men and women. If you are interested in receiving a free colonoscopy, please call [facility] at [phone] (mention code [employer]) to see if you re eligible.     If you do not wish to do a colonoscopy or cannot afford to do one, at this time, there is another option. It is called a FIT test or Fecal Immunochemical Occult Blood Test (take home stool sample kit).  It does not replace the colonoscopy for colorectal cancer  screening, but it can detect hidden bleeding in the lower colon.  It does need to be repeated every year and if a positive result is obtained, you would be referred for a colonoscopy.    If you have completed either one of these tests at another facility, please call/respond to this message with the details of when and where the tests were done and if they were normal or not. Or have the records sent to our clinic so that we can best coordinate your care.    To schedule an appointment or discuss this further, you may contact us by phone at the [facility] at [phone] or online through the patient [email or web address] - your brands @ [email or web address]    Thank you for trusting [facility] and we appreciate the opportunity to serve you.  We look forward to supporting your healthcare needs in the future.    Your partners in health,      Quality Committee at [facility]

## 2023-04-12 NOTE — TELEPHONE ENCOUNTER
Patient Quality Outreach    Patient is due for the following:   Colon Cancer Screening  IVD  -  LDL (Fasting)  Physical Annual Wellness Visit      Topic Date Due     Zoster (Shingles) Vaccine (1 of 2) Never done     Pneumococcal Vaccine (2 - PCV) 04/24/2019     COVID-19 Vaccine (5 - Booster for Moderna series) 09/01/2022       Next Steps:   Schedule a Annual Wellness Visit    Type of outreach:    Sent letter.      Questions for provider review:    None     Zayra Mc MA

## 2023-04-23 ENCOUNTER — HEALTH MAINTENANCE LETTER (OUTPATIENT)
Age: 68
End: 2023-04-23

## 2023-06-13 ENCOUNTER — ANCILLARY PROCEDURE (OUTPATIENT)
Dept: MAMMOGRAPHY | Facility: CLINIC | Age: 68
End: 2023-06-13
Attending: INTERNAL MEDICINE
Payer: COMMERCIAL

## 2023-06-13 ENCOUNTER — ANCILLARY ORDERS (OUTPATIENT)
Dept: ONCOLOGY | Facility: CLINIC | Age: 68
End: 2023-06-13

## 2023-06-13 ENCOUNTER — ONCOLOGY VISIT (OUTPATIENT)
Dept: ONCOLOGY | Facility: CLINIC | Age: 68
End: 2023-06-13
Attending: INTERNAL MEDICINE
Payer: COMMERCIAL

## 2023-06-13 VITALS
HEART RATE: 97 BPM | OXYGEN SATURATION: 95 % | SYSTOLIC BLOOD PRESSURE: 119 MMHG | TEMPERATURE: 98.2 F | DIASTOLIC BLOOD PRESSURE: 82 MMHG | RESPIRATION RATE: 18 BRPM

## 2023-06-13 DIAGNOSIS — Z17.0 MALIGNANT NEOPLASM OF UPPER-INNER QUADRANT OF RIGHT BREAST IN FEMALE, ESTROGEN RECEPTOR POSITIVE (H): Primary | ICD-10-CM

## 2023-06-13 DIAGNOSIS — Z12.31 ENCOUNTER FOR SCREENING MAMMOGRAM FOR MALIGNANT NEOPLASM OF BREAST: ICD-10-CM

## 2023-06-13 DIAGNOSIS — I10 HYPERTENSION GOAL BP (BLOOD PRESSURE) < 140/90: ICD-10-CM

## 2023-06-13 DIAGNOSIS — C50.211 MALIGNANT NEOPLASM OF UPPER-INNER QUADRANT OF RIGHT BREAST IN FEMALE, ESTROGEN RECEPTOR POSITIVE (H): Primary | ICD-10-CM

## 2023-06-13 PROCEDURE — 99214 OFFICE O/P EST MOD 30 MIN: CPT | Performed by: INTERNAL MEDICINE

## 2023-06-13 PROCEDURE — 77063 BREAST TOMOSYNTHESIS BI: CPT | Performed by: RADIOLOGY

## 2023-06-13 PROCEDURE — 77067 SCR MAMMO BI INCL CAD: CPT | Performed by: RADIOLOGY

## 2023-06-13 RX ORDER — LETROZOLE 2.5 MG/1
2.5 TABLET, FILM COATED ORAL DAILY
Qty: 90 TABLET | Refills: 3 | Status: SHIPPED | OUTPATIENT
Start: 2023-06-13 | End: 2023-06-13

## 2023-06-13 RX ORDER — GABAPENTIN 100 MG/1
CAPSULE ORAL
Qty: 90 CAPSULE | Refills: 3 | Status: SHIPPED | OUTPATIENT
Start: 2023-06-13 | End: 2023-06-13

## 2023-06-13 RX ORDER — LETROZOLE 2.5 MG/1
2.5 TABLET, FILM COATED ORAL DAILY
Qty: 90 TABLET | Refills: 3 | Status: SHIPPED | OUTPATIENT
Start: 2023-06-13 | End: 2024-06-17

## 2023-06-13 RX ORDER — HYDROCHLOROTHIAZIDE 25 MG/1
25 TABLET ORAL DAILY
Qty: 90 TABLET | Refills: 3 | Status: CANCELLED | OUTPATIENT
Start: 2023-06-13

## 2023-06-13 RX ORDER — GABAPENTIN 100 MG/1
CAPSULE ORAL
Qty: 90 CAPSULE | Refills: 3 | Status: SHIPPED | OUTPATIENT
Start: 2023-06-13 | End: 2024-02-20

## 2023-06-13 RX ORDER — LISINOPRIL 10 MG/1
TABLET ORAL
Qty: 90 TABLET | Refills: 3 | Status: CANCELLED | OUTPATIENT
Start: 2023-06-13

## 2023-06-13 ASSESSMENT — PAIN SCALES - GENERAL: PAINLEVEL: NO PAIN (0)

## 2023-06-13 NOTE — NURSING NOTE
"Oncology Rooming Note    June 13, 2023 5:13 PM   Roxie Woods is a 67 year old female who presents for:    Chief Complaint   Patient presents with     Oncology Clinic Visit     6 month follow up      Initial Vitals: /82 (BP Location: Left arm, Patient Position: Sitting, Cuff Size: Adult Large)   Pulse 97   Temp 98.2  F (36.8  C) (Oral)   Resp 18   SpO2 95%  Estimated body mass index is 43.08 kg/m  as calculated from the following:    Height as of 3/20/23: 1.588 m (5' 2.52\").    Weight as of 3/20/23: 108.6 kg (239 lb 8 oz). There is no height or weight on file to calculate BSA.  No Pain (0) Comment: Data Unavailable   No LMP recorded. Patient is postmenopausal.  Allergies reviewed: Yes  Medications reviewed: Yes    Medications: MEDICATION REFILLS NEEDED TODAY. Provider was notified.  Pharmacy name entered into [facility]: [facility] [address] [place] [id number]    Clinical concerns: Please refill  Medications as needed.       Faina Khan LPN June 13, 2023 5:22 PM              "

## 2023-06-13 NOTE — LETTER
6/13/2023         RE: Roxie Woods  [address] [facility] [address]        Dear Colleague,    Thank you for referring your patient, Roxie Woods, to the [facility]. Please see a copy of my visit note below.    [facility]  MEDICAL ONCOLOGY    RETURN PATIENT VISIT NOTE    Reason for visit: breast cancer    Oncology Treatment Summary  1.  6/24/2021 screening mammogram done an asymmetry with architectural distortion at the 12 o'clock position of the right breast.  Subsequent ultrasound was performed 6/30/2021 which found a 1.2 x 1.1 x 1.2 cm abnormality.  Biopsy was done which found an invasive ductal cancer grade 2 estrogen receptor 100% positive progesterone receptor 80% positive HER-2/shari negative via DONNIE.  2.  On 7/22/2021 she underwent a right lumpectomy for a 1.5 cm grade 2 invasive ductal cancer.  Margins were negative.  2 lymph nodes were removed both of which were negative.  3.  Oncotype was sent off which showed a recurrence score of 11  4. Adjuvant radiation completed 10/8/21  5. Started adjuvant arimidex 10/20/21 - January 2022 stopped due to arthalgias  6. June 2022 started exemestane    HISTORY OF PRESENTING ILLNESS  Patient is a very pleasant 67-year-old female who presents today for followup. She had been tolerating her exemestane however when she went to get it filled this year her cost was a few hundred dollars. She has stopped it. She has no new issues or complaints. She continues to have significant hot flashes that are bothersome. She is taking effexor .     PAST MEDICAL HISTORY  Hypertension, carpal tunnel surgery, factor V Leiden heterozygous mutation but no history of blood clots      CURRENT OUTPATIENT MEDICATIONS  Current Outpatient Medications   Medication     exemestane (AROMASIN) 25 MG tablet     hydrochlorothiazide (HYDRODIURIL) 25 MG tablet     lisinopril (ZESTRIL) 10 MG tablet     multivitamin w/minerals  (THERA-VIT-M) tablet     predniSONE (DELTASONE) 20 MG tablet     simvastatin (ZOCOR) 20 MG tablet     venlafaxine (EFFEXOR) 75 MG tablet     VITAMIN D PO     No current facility-administered medications for this visit.        ALLERGIES     Allergies   Allergen Reactions     Zoloft Rash        SOCIAL HISTORY  She is  and lives with her  Flynn in [place], no tobacco use although she does smoke 1 pack a day for many years and quit this 1 year ago with the nicotine patch.  No excessive alcohol use she works part-time     FAMILY HISTORY  Mother had breast cancer in her 70s Father had prostate cancer and colon cancer in his 70s and sister was recently diagnosed with a triple negative breast cancer at 68     REVIEW OF SYSTEMS  Review Of Systems  Skin: negative  Eyes: negative  Ears/Nose/Throat: negative  Respiratory: No shortness of breath, dyspnea on exertion, cough, or hemoptysis  Cardiovascular: negative  Gastrointestinal: negative  Genitourinary: negative  Musculoskeletal: negative  Neurologic: negative  Psychiatric: negative  Hematologic/Lymphatic/Immunologic: negative  Endocrine: negative      PHYSICAL EXAM  B/P: 125/81, T: 98.5, P: 95, R: 16  Wt Readings from Last 3 Encounters:   03/20/23 108.6 kg (239 lb 8 oz)   12/20/22 108.9 kg (240 lb)   06/21/22 107.5 kg (237 lb)       ECOG PPS0    Physical Exam  Vitals reviewed.   Constitutional:       Appearance: Normal appearance. She is normal weight.   HENT:      Head: Normocephalic and atraumatic.   Eyes:      Extraocular Movements: Extraocular movements intact.      Conjunctiva/sclera: Conjunctivae normal.      Pupils: Pupils are equal, round, and reactive to light.   Cardiovascular:      Rate and Rhythm: Normal rate and regular rhythm.   Pulmonary:      Effort: Pulmonary effort is normal.      Breath sounds: Normal breath sounds.   Abdominal:      General: Abdomen is flat. Bowel sounds are normal.      Palpations: Abdomen is soft.   Musculoskeletal:       Cervical back: Normal range of motion.   Neurological:      General: No focal deficit present.      Mental Status: She is alert and oriented to person, place, and time. Mental status is at baseline.   Psychiatric:         Mood and Affect: Mood normal.         Behavior: Behavior normal.         Thought Content: Thought content normal.         Judgment: Judgment normal.         breast exam: right tram flap reconstruction, axilla benign. Left no masses, no nipple discharge and axilla benign.     LABORATORY AND IMAGING STUDIES  Recent Labs   Lab Test 06/23/21  0949 04/23/19  1135 04/24/18  1136 04/25/17  1137 03/28/16  1208    137 137 139 138   POTASSIUM 4.5 4.5 4.2 4.1 4.0   CHLORIDE 102 104 102 102 103   CO2 30 26 25 28 28   ANIONGAP 2* 7 10 9 7   BUN 18 15 14 13 14   CR 0.80 0.60 0.66 0.65 0.62   * 91 98 101* 94   JAIDEN 8.9 9.7 9.3 9.2 8.7     No results for input(s): MAG, PHOS in the last 61320 hours.  Recent Labs   Lab Test 06/23/21  0949 03/28/16  1208   WBC 8.4 6.8   HGB 13.6 14.6    278   MCV 98 98     Recent Labs   Lab Test 06/23/21  0949 03/28/16  1208 05/05/15  1258   BILITOTAL  --  0.4 0.4   ALKPHOS  --  115 124   ALT 43 22 25   AST 30 19 18   ALBUMIN  --  3.8 3.8     No results found for: TSH  No results for input(s): CEA in the last 57866 hours.  Results for orders placed or performed in visit on 06/13/23   MA Screen Bilateral w/Akin    Narrative    Examination: Bilateral digital screening mammography and bilateral  digital tomosynthesis with computer aided detection.    Comparison: 6/10/2022, 7/22/2021, 6/24/2021    History/family history: No symptoms, routine screening.    TECHNIQUE:  Tomosynthesis    BREAST DENSITY: Scattered fibroglandular densities.    COMMENTS: Breast conservation therapy changes in the right breast. No  significant interval change. No radiographic evidence of malignancy.      Impression    IMPRESSION: BI-RADS CATEGORY: 2 - Benign.    RECOMMENDED FOLLOW-UP: Annual  Mammography.        Results to be sent to the patient.    FRIDA CASTELLANOS MD         SYSTEM ID:  [id number]          ASSESSMENT  AND RECOMMENDATIONS:    1.  T1 cN0 M0 invasive ductal cancer of the right breast status post lumpectomy % positive [place] 80% positive HER-2/shari negative via FISH with an Oncotype recurrence score of 11 s/p radiation on arimidex  2. Osteoarthritis  3. Hot flashes      Plan    1. Mammogram today, it was unremarkable  2. She will try neurontin for hot flashes at bedtime to see if she sleeps better.   3. Since her last dexa was normal we will wait until next year to repeat.   4. She will try femara and see what her copay is.    Nancy Colón MD on 6/13/2023 at 5:28 PM                Again, thank you for allowing me to participate in the care of your patient.        Sincerely,        Nancy Colón MD

## 2023-06-13 NOTE — PROGRESS NOTES
[facility] PHYSICIANS  MEDICAL ONCOLOGY    RETURN PATIENT VISIT NOTE    Reason for visit: breast cancer    Oncology Treatment Summary  1.  6/24/2021 screening mammogram done an asymmetry with architectural distortion at the 12 o'clock position of the right breast.  Subsequent ultrasound was performed 6/30/2021 which found a 1.2 x 1.1 x 1.2 cm abnormality.  Biopsy was done which found an invasive ductal cancer grade 2 estrogen receptor 100% positive progesterone receptor 80% positive HER-2/shari negative via DONNIE.  2.  On 7/22/2021 she underwent a right lumpectomy for a 1.5 cm grade 2 invasive ductal cancer.  Margins were negative.  2 lymph nodes were removed both of which were negative.  3.  Oncotype was sent off which showed a recurrence score of 11  4. Adjuvant radiation completed 10/8/21  5. Started adjuvant arimidex 10/20/21 - January 2022 stopped due to arthalgias  6. June 2022 started exemestane    HISTORY OF PRESENTING ILLNESS  Patient is a very pleasant 67-year-old female who presents today for followup. She had been tolerating her exemestane however when she went to get it filled this year her cost was a few hundred dollars. She has stopped it. She has no new issues or complaints. She continues to have significant hot flashes that are bothersome. She is taking effexor .     PAST MEDICAL HISTORY  Hypertension, carpal tunnel surgery, factor V Leiden heterozygous mutation but no history of blood clots      CURRENT OUTPATIENT MEDICATIONS  Current Outpatient Medications   Medication     exemestane (AROMASIN) 25 MG tablet     hydrochlorothiazide (HYDRODIURIL) 25 MG tablet     lisinopril (ZESTRIL) 10 MG tablet     multivitamin w/minerals (THERA-VIT-M) tablet     predniSONE (DELTASONE) 20 MG tablet     simvastatin (ZOCOR) 20 MG tablet     venlafaxine (EFFEXOR) 75 MG tablet     VITAMIN D PO     No current facility-administered medications for this visit.        ALLERGIES     Allergies   Allergen Reactions      Zoloft Rash        SOCIAL HISTORY  She is  and lives with her  Flynn in [place], no tobacco use although she does smoke 1 pack a day for many years and quit this 1 year ago with the nicotine patch.  No excessive alcohol use she works part-time     FAMILY HISTORY  Mother had breast cancer in her 70s Father had prostate cancer and colon cancer in his 70s and sister was recently diagnosed with a triple negative breast cancer at 68     REVIEW OF SYSTEMS  Review Of Systems  Skin: negative  Eyes: negative  Ears/Nose/Throat: negative  Respiratory: No shortness of breath, dyspnea on exertion, cough, or hemoptysis  Cardiovascular: negative  Gastrointestinal: negative  Genitourinary: negative  Musculoskeletal: negative  Neurologic: negative  Psychiatric: negative  Hematologic/Lymphatic/Immunologic: negative  Endocrine: negative      PHYSICAL EXAM  B/P: 125/81, T: 98.5, P: 95, R: 16  Wt Readings from Last 3 Encounters:   03/20/23 108.6 kg (239 lb 8 oz)   12/20/22 108.9 kg (240 lb)   06/21/22 107.5 kg (237 lb)       ECOG PPS0    Physical Exam  Vitals reviewed.   Constitutional:       Appearance: Normal appearance. She is normal weight.   HENT:      Head: Normocephalic and atraumatic.   Eyes:      Extraocular Movements: Extraocular movements intact.      Conjunctiva/sclera: Conjunctivae normal.      Pupils: Pupils are equal, round, and reactive to light.   Cardiovascular:      Rate and Rhythm: Normal rate and regular rhythm.   Pulmonary:      Effort: Pulmonary effort is normal.      Breath sounds: Normal breath sounds.   Abdominal:      General: Abdomen is flat. Bowel sounds are normal.      Palpations: Abdomen is soft.   Musculoskeletal:      Cervical back: Normal range of motion.   Neurological:      General: No focal deficit present.      Mental Status: She is alert and oriented to person, place, and time. Mental status is at baseline.   Psychiatric:         Mood and Affect: Mood normal.         Behavior:  Behavior normal.         Thought Content: Thought content normal.         Judgment: Judgment normal.         breast exam: right tram flap reconstruction, axilla benign. Left no masses, no nipple discharge and axilla benign.     LABORATORY AND IMAGING STUDIES  Recent Labs   Lab Test 06/23/21  0949 04/23/19  1135 04/24/18  1136 04/25/17  1137 03/28/16  1208    137 137 139 138   POTASSIUM 4.5 4.5 4.2 4.1 4.0   CHLORIDE 102 104 102 102 103   CO2 30 26 25 28 28   ANIONGAP 2* 7 10 9 7   BUN 18 15 14 13 14   CR 0.80 0.60 0.66 0.65 0.62   * 91 98 101* 94   JAIDEN 8.9 9.7 9.3 9.2 8.7     No results for input(s): MAG, PHOS in the last 07077 hours.  Recent Labs   Lab Test 06/23/21  0949 03/28/16  1208   WBC 8.4 6.8   HGB 13.6 14.6    278   MCV 98 98     Recent Labs   Lab Test 06/23/21  0949 03/28/16  1208 05/05/15  1258   BILITOTAL  --  0.4 0.4   ALKPHOS  --  115 124   ALT 43 22 25   AST 30 19 18   ALBUMIN  --  3.8 3.8     No results found for: TSH  No results for input(s): CEA in the last 55698 hours.  Results for orders placed or performed in visit on 06/13/23   MA Screen Bilateral w/Akin    Narrative    Examination: Bilateral digital screening mammography and bilateral  digital tomosynthesis with computer aided detection.    Comparison: 6/10/2022, 7/22/2021, 6/24/2021    History/family history: No symptoms, routine screening.    TECHNIQUE:  Tomosynthesis    BREAST DENSITY: Scattered fibroglandular densities.    COMMENTS: Breast conservation therapy changes in the right breast. No  significant interval change. No radiographic evidence of malignancy.      Impression    IMPRESSION: BI-RADS CATEGORY: 2 - Benign.    RECOMMENDED FOLLOW-UP: Annual Mammography.        Results to be sent to the patient.    FRIDA CASTELLANOS MD         SYSTEM ID:  [id number]          ASSESSMENT  AND RECOMMENDATIONS:    1.  T1 cN0 M0 invasive ductal cancer of the right breast status post lumpectomy % positive [place] 80% positive  HER-2/shari negative via FISH with an Oncotype recurrence score of 11 s/p radiation on arimidex  2. Osteoarthritis  3. Hot flashes      Plan    1. Mammogram today, it was unremarkable  2. She will try neurontin for hot flashes at bedtime to see if she sleeps better.   3. Since her last dexa was normal we will wait until next year to repeat.   4. She will try femara and see what her copay is.    Nancy Colón MD on 6/13/2023 at 5:28 PM

## 2023-07-07 DIAGNOSIS — I10 HYPERTENSION GOAL BP (BLOOD PRESSURE) < 140/90: ICD-10-CM

## 2023-07-07 NOTE — TELEPHONE ENCOUNTER
SUBJECTIVE/OBJECTIVE:                                                    Refill request receive for:  Lisinopril & Hydrochlorothiazide    Last refill per fax: 4/1/23  Date of LOV r/t Medication: 6/16/23  Future appt scheduled? Yes: 12/12/23   Date faxed to clinic: 7/7/23    RECENT LABS/VITALS                                                      Recent Labs   Lab Test 06/23/21  0949 04/23/19  1135 03/28/16  1208 05/05/15  1258   CHOL 185 195   < > 256*   HDL 61 52   < > 54   LDL 99 113*   < > 177*   TRIG 125 152*   < > 123   CHOLHDLRATIO  --   --   --  4.7    < > = values in this interval not displayed.     Lab Results   Component Value Date    AST 30 06/23/2021     Lab Results   Component Value Date    ALT 43 06/23/2021     Lab Results   Component Value Date    A1C 5.7 06/23/2021    A1C 5.3 04/23/2019    A1C 5.3 03/28/2016    A1C 5.5 05/05/2015     Creatinine   Date Value Ref Range Status   06/23/2021 0.80 0.52 - 1.04 mg/dL Final   ]  Potassium   Date Value Ref Range Status   06/23/2021 4.5 3.4 - 5.3 mmol/L Final   ]  No results found for: TSH  BP Readings from Last 4 Encounters:   06/13/23 119/82   03/20/23 120/80   12/20/22 122/82   12/09/22 (!) 146/84       PLAN:                                                      Sent to provider to advise.    Whitney Garcia, CMA

## 2023-07-11 RX ORDER — HYDROCHLOROTHIAZIDE 25 MG/1
25 TABLET ORAL DAILY
Qty: 90 TABLET | Refills: 3 | Status: SHIPPED | OUTPATIENT
Start: 2023-07-11 | End: 2024-07-24

## 2023-07-11 RX ORDER — LISINOPRIL 10 MG/1
TABLET ORAL
Qty: 90 TABLET | Refills: 3 | Status: SHIPPED | OUTPATIENT
Start: 2023-07-11 | End: 2024-07-24

## 2023-08-29 ENCOUNTER — OFFICE VISIT (OUTPATIENT)
Dept: SURGERY | Facility: CLINIC | Age: 68
End: 2023-08-29
Payer: COMMERCIAL

## 2023-08-29 VITALS
WEIGHT: 225 LBS | TEMPERATURE: 97.2 F | HEIGHT: 62 IN | BODY MASS INDEX: 41.41 KG/M2 | DIASTOLIC BLOOD PRESSURE: 72 MMHG | SYSTOLIC BLOOD PRESSURE: 116 MMHG

## 2023-08-29 DIAGNOSIS — E66.01 MORBID OBESITY WITH BMI OF 40.0-44.9, ADULT (H): Primary | ICD-10-CM

## 2023-08-29 DIAGNOSIS — C50.211 MALIGNANT NEOPLASM OF UPPER-INNER QUADRANT OF RIGHT BREAST IN FEMALE, ESTROGEN RECEPTOR POSITIVE (H): ICD-10-CM

## 2023-08-29 DIAGNOSIS — Z87.891 PERSONAL HISTORY OF TOBACCO USE, PRESENTING HAZARDS TO HEALTH: ICD-10-CM

## 2023-08-29 DIAGNOSIS — D68.51 FACTOR 5 LEIDEN MUTATION, HETEROZYGOUS (H): ICD-10-CM

## 2023-08-29 DIAGNOSIS — Z17.0 MALIGNANT NEOPLASM OF UPPER-INNER QUADRANT OF RIGHT BREAST IN FEMALE, ESTROGEN RECEPTOR POSITIVE (H): ICD-10-CM

## 2023-08-29 DIAGNOSIS — F32.5 MAJOR DEPRESSION IN REMISSION (H): ICD-10-CM

## 2023-08-29 PROCEDURE — 99214 OFFICE O/P EST MOD 30 MIN: CPT | Performed by: SURGERY

## 2023-08-29 ASSESSMENT — PAIN SCALES - GENERAL: PAINLEVEL: NO PAIN (0)

## 2023-08-29 NOTE — PROGRESS NOTES
"  Assessment & Plan   Problem List Items Addressed This Visit          Digestive    Morbid obesity with BMI of 40.0-44.9, adult (H) - Primary       Hematologic    Factor 5 Leiden mutation, heterozygous (H)       Behavioral    Major depression in remission (H)    Personal history of tobacco use, presenting hazards to health       Other    Malignant neoplasm of upper-inner quadrant of right breast in female, estrogen receptor positive (H)      67 yo F s/p left partial mastectomy with SLNB 7/22/2021 s/p radiation currently on AI.   No new issues  Mammogram June 2023 - BIRAD 2  All of her questions were otherwise answered.  Follow-up in 6 months     Face to Face/patient Contact total time: 5 minutes  Pre Charting time: 10 minutes; Post charting time, communication and other activities: 10 minutes;   Total time:  25 minutes         BMI:   Estimated body mass index is 40.82 kg/m  as calculated from the following:    Height as of this encounter: 1.581 m (5' 2.25\").    Weight as of this encounter: 102.1 kg (225 lb).       No follow-ups on file.    [facility] MD Sanjana  [facility]   Roxie is a 66 year old who presents for the following health issues:    right breast - IDC, 2/3; DCIS+; ER/[place] (+); HER2 (-) 1 oclock 6cm FNAC; 1.2 in greatest cm on US; Factor V lieden - no blood thinner    wire local partial mastectomy on 7/22/2021 - T1cN0' on arimidex since 10/20/21; adj radiation completed 10/8/2021; Oncotype RI 11    Doing well.  No issues.  Does have some joint pain on her bilateral knees.  Stopped taking the arimidex (Feb 2022) due to possible cause of joint pain.  Not the aromasin due to joint pain like the arimidex.  Switched to femara in June 2023 - no issues thus far.  Mammogram in June 2023 - BIRAD 2. No new issues.           Review of Systems   Constitutional, HEENT, cardiovascular, pulmonary, GI, , musculoskeletal, neuro, skin, endocrine and psych systems are negative, except as " "otherwise noted.      Objective    /72   Temp 97.2  F (36.2  C) (Temporal)   Ht 1.581 m (5' 2.25\")   Wt 102.1 kg (225 lb)   BMI 40.82 kg/m    Body mass index is 40.82 kg/m .  Physical Exam  Vitals reviewed.   HENT:      Head: Normocephalic.   Eyes:      Conjunctiva/sclera: Conjunctivae normal.   Cardiovascular:      Rate and Rhythm: Normal rate.      Pulses: Normal pulses.   Pulmonary:      Effort: Pulmonary effort is normal.   Chest:   Breasts:     Right: No nipple discharge, skin change or tenderness.      Left: No nipple discharge, skin change or tenderness.       Abdominal:      Palpations: Abdomen is soft.   Musculoskeletal:         General: Normal range of motion.      Cervical back: Normal range of motion.   Lymphadenopathy:      Upper Body:      Right upper body: No supraclavicular or axillary adenopathy.      Left upper body: No supraclavicular or axillary adenopathy.   Skin:     General: Skin is warm.      Capillary Refill: Capillary refill takes less than 2 seconds.   Neurological:      General: No focal deficit present.      Mental Status: She is alert.   Psychiatric:         Mood and Affect: Mood normal.                "

## 2023-08-29 NOTE — LETTER
"    8/29/2023         RE: Roxie Woods  [address] [facility] [address]        Dear Colleague,    Thank you for referring your patient, Roxie Woods, to the [facility]. Please see a copy of my visit note below.      Assessment & Plan  Problem List Items Addressed This Visit          Digestive    Morbid obesity with BMI of 40.0-44.9, adult (H) - Primary       Hematologic    Factor 5 Leiden mutation, heterozygous (H)       Behavioral    Major depression in remission (H)    Personal history of tobacco use, presenting hazards to health       Other    Malignant neoplasm of upper-inner quadrant of right breast in female, estrogen receptor positive (H)      67 yo F s/p left partial mastectomy with SLNB 7/22/2021 s/p radiation currently on AI.   No new issues  Mammogram June 2023 - BIRAD 2  All of her questions were otherwise answered.  Follow-up in 6 months     Face to Face/patient Contact total time: 5 minutes  Pre Charting time: 10 minutes; Post charting time, communication and other activities: 10 minutes;   Total time:  25 minutes         BMI:   Estimated body mass index is 40.82 kg/m  as calculated from the following:    Height as of this encounter: 1.581 m (5' 2.25\").    Weight as of this encounter: 102.1 kg (225 lb).       No follow-ups on file.    Agustín Allan MD  [facility]    Subjective  Roxie is a 66 year old who presents for the following health issues:    right breast - IDC, 2/3; DCIS+; ER/[place] (+); HER2 (-) 1 oclock 6cm FNAC; 1.2 in greatest cm on US; Factor V lieden - no blood thinner    wire local partial mastectomy on 7/22/2021 - T1cN0' on arimidex since 10/20/21; adj radiation completed 10/8/2021; Oncotype RI 11    Doing well.  No issues.  Does have some joint pain on her bilateral knees.  Stopped taking the arimidex (Feb 2022) due to possible cause of joint pain.  Not the aromasin due to joint pain like the arimidex.  Switched to " "femara in June 2023 - no issues thus far.  Mammogram in June 2023 - BIRAD 2. No new issues.           Review of Systems   Constitutional, HEENT, cardiovascular, pulmonary, GI, , musculoskeletal, neuro, skin, endocrine and psych systems are negative, except as otherwise noted.      Objective   /72   Temp 97.2  F (36.2  C) (Temporal)   Ht 1.581 m (5' 2.25\")   Wt 102.1 kg (225 lb)   BMI 40.82 kg/m    Body mass index is 40.82 kg/m .  Physical Exam  Vitals reviewed.   HENT:      Head: Normocephalic.   Eyes:      Conjunctiva/sclera: Conjunctivae normal.   Cardiovascular:      Rate and Rhythm: Normal rate.      Pulses: Normal pulses.   Pulmonary:      Effort: Pulmonary effort is normal.   Chest:   Breasts:     Right: No nipple discharge, skin change or tenderness.      Left: No nipple discharge, skin change or tenderness.       Abdominal:      Palpations: Abdomen is soft.   Musculoskeletal:         General: Normal range of motion.      Cervical back: Normal range of motion.   Lymphadenopathy:      Upper Body:      Right upper body: No supraclavicular or axillary adenopathy.      Left upper body: No supraclavicular or axillary adenopathy.   Skin:     General: Skin is warm.      Capillary Refill: Capillary refill takes less than 2 seconds.   Neurological:      General: No focal deficit present.      Mental Status: She is alert.   Psychiatric:         Mood and Affect: Mood normal.                  Again, thank you for allowing me to participate in the care of your patient.        Sincerely,        JusetnNils Allan MD  "

## 2024-01-02 DIAGNOSIS — E78.5 HYPERLIPIDEMIA LDL GOAL <130: ICD-10-CM

## 2024-01-02 DIAGNOSIS — F32.5 MAJOR DEPRESSION IN REMISSION (H): ICD-10-CM

## 2024-01-02 RX ORDER — SIMVASTATIN 20 MG
TABLET ORAL
Qty: 90 TABLET | Refills: 3 | Status: SHIPPED | OUTPATIENT
Start: 2024-01-02

## 2024-01-02 RX ORDER — VENLAFAXINE 75 MG/1
75 TABLET ORAL 2 TIMES DAILY
Qty: 180 TABLET | Refills: 3 | Status: SHIPPED | OUTPATIENT
Start: 2024-01-02

## 2024-01-02 NOTE — TELEPHONE ENCOUNTER
SUBJECTIVE/OBJECTIVE:                                                    Refill request receive for:  Simvastatin    Last refill per fax: 9/8/23  Date of LOV r/t Medication: 6/13/23  Future appt scheduled? No   Date faxed to clinic: 12/31/23    PLAN:                                                      Sent to provider to advise.

## 2024-02-20 ENCOUNTER — OFFICE VISIT (OUTPATIENT)
Dept: SURGERY | Facility: CLINIC | Age: 69
End: 2024-02-20
Payer: COMMERCIAL

## 2024-02-20 VITALS
HEIGHT: 62 IN | SYSTOLIC BLOOD PRESSURE: 122 MMHG | BODY MASS INDEX: 43.98 KG/M2 | DIASTOLIC BLOOD PRESSURE: 80 MMHG | WEIGHT: 239 LBS | TEMPERATURE: 96.8 F

## 2024-02-20 DIAGNOSIS — D68.51 FACTOR 5 LEIDEN MUTATION, HETEROZYGOUS (H): ICD-10-CM

## 2024-02-20 DIAGNOSIS — F32.5 MAJOR DEPRESSION IN REMISSION (H): ICD-10-CM

## 2024-02-20 DIAGNOSIS — E66.01 MORBID OBESITY WITH BMI OF 40.0-44.9, ADULT (H): ICD-10-CM

## 2024-02-20 DIAGNOSIS — C50.211 MALIGNANT NEOPLASM OF UPPER-INNER QUADRANT OF RIGHT BREAST IN FEMALE, ESTROGEN RECEPTOR POSITIVE (H): ICD-10-CM

## 2024-02-20 DIAGNOSIS — Z17.0 MALIGNANT NEOPLASM OF UPPER-INNER QUADRANT OF RIGHT BREAST IN FEMALE, ESTROGEN RECEPTOR POSITIVE (H): ICD-10-CM

## 2024-02-20 PROCEDURE — 99213 OFFICE O/P EST LOW 20 MIN: CPT | Performed by: SURGERY

## 2024-02-20 ASSESSMENT — PAIN SCALES - GENERAL: PAINLEVEL: NO PAIN (0)

## 2024-02-20 NOTE — PROGRESS NOTES
"  Assessment & Plan   Problem List Items Addressed This Visit          Digestive    Morbid obesity with BMI of 40.0-44.9, adult (H)       Hematologic    Factor 5 Leiden mutation, heterozygous (H24)       Behavioral    Major depression in remission (H24)       Other    Malignant neoplasm of upper-inner quadrant of right breast in female, estrogen receptor positive (H)        67 yo F s/p left partial mastectomy with SLNB 7/22/2021 s/p radiation currently on AI.   No new issues  Mammogram June 2023 - BIRAD 2  All of her questions were otherwise answered.  Follow-up in 6 months     Face to Face/patient Contact total time: 10 minutes  Pre Charting time: 10 minutes; Post charting time, communication and other activities: 5 minutes;   Total time:  25 minutes         BMI:   Estimated body mass index is 43.36 kg/m  as calculated from the following:    Height as of this encounter: 1.581 m (5' 2.25\").    Weight as of this encounter: 108.4 kg (239 lb).       No follow-ups on file.    [facility] / NHRMCCele Allan MD  [facility]   Roxie is a 66 year old who presents for the following health issues:    right breast - IDC, 2/3; DCIS+; ER/[place] (+); HER2 (-) 1 oclock 6cm FNAC; 1.2 in greatest cm on US; Factor V lieden - no blood thinner    wire local partial mastectomy on 7/22/2021 - T1cN0' on arimidex since 10/20/21; adj radiation completed 10/8/2021; Oncotype RI 11    Doing well.  No issues.  Does have some joint pain on her bilateral knees.  Stopped taking the arimidex (Feb 2022) due to possible cause of joint pain.  Not the aromasin due to joint pain like the arimidex.  Switched to femara in June 2023 - no issues thus far.  Mammogram in June 2023 - BIRAD 2. No new issues.           Review of Systems   Constitutional, HEENT, cardiovascular, pulmonary, GI, , musculoskeletal, neuro, skin, endocrine and psych systems are negative, except as otherwise noted.      Objective    /80   Temp 96.8  F (36  C) " "(Temporal)   Ht 1.581 m (5' 2.25\")   Wt 108.4 kg (239 lb)   BMI 43.36 kg/m    Body mass index is 43.36 kg/m .  Physical Exam  Vitals reviewed.   HENT:      Head: Normocephalic.   Eyes:      Conjunctiva/sclera: Conjunctivae normal.   Cardiovascular:      Rate and Rhythm: Normal rate.      Pulses: Normal pulses.   Pulmonary:      Effort: Pulmonary effort is normal.   Chest:   Breasts:     Right: No nipple discharge, skin change or tenderness.      Left: No nipple discharge, skin change or tenderness.       Abdominal:      Palpations: Abdomen is soft.   Musculoskeletal:         General: Normal range of motion.      Cervical back: Normal range of motion.   Lymphadenopathy:      Upper Body:      Right upper body: No supraclavicular or axillary adenopathy.      Left upper body: No supraclavicular or axillary adenopathy.   Skin:     General: Skin is warm.      Capillary Refill: Capillary refill takes less than 2 seconds.   Neurological:      General: No focal deficit present.      Mental Status: She is alert.   Psychiatric:         Mood and Affect: Mood normal.                "

## 2024-02-20 NOTE — LETTER
"    2/20/2024         RE: Roxie Woods  [address] [facility] [address]        Dear Colleague,    Thank you for referring your patient, Roxie Woods, to the [facility]. Please see a copy of my visit note below.      Assessment & Plan  Problem List Items Addressed This Visit          Digestive    Morbid obesity with BMI of 40.0-44.9, adult (H)       Hematologic    Factor 5 Leiden mutation, heterozygous (H24)       Behavioral    Major depression in remission (H24)       Other    Malignant neoplasm of upper-inner quadrant of right breast in female, estrogen receptor positive (H)        65 yo F s/p left partial mastectomy with SLNB 7/22/2021 s/p radiation currently on AI.   No new issues  Mammogram June 2023 - ABIGAIL 2  All of her questions were otherwise answered.  Follow-up in 6 months     Face to Face/patient Contact total time: 10 minutes  Pre Charting time: 10 minutes; Post charting time, communication and other activities: 5 minutes;   Total time:  25 minutes         BMI:   Estimated body mass index is 43.36 kg/m  as calculated from the following:    Height as of this encounter: 1.581 m (5' 2.25\").    Weight as of this encounter: 108.4 kg (239 lb).       No follow-ups on file.    Agustín Allan MD  [facility]    Liliana Faustin is a 66 year old who presents for the following health issues:    right breast - IDC, 2/3; DCIS+; ER/[place] (+); HER2 (-) 1 oclock 6cm FNAC; 1.2 in greatest cm on US; Factor V lieden - no blood thinner    wire local partial mastectomy on 7/22/2021 - T1cN0' on arimidex since 10/20/21; adj radiation completed 10/8/2021; Oncotype RI 11    Doing well.  No issues.  Does have some joint pain on her bilateral knees.  Stopped taking the arimidex (Feb 2022) due to possible cause of joint pain.  Not the aromasin due to joint pain like the arimidex.  Switched to femara in June 2023 - no issues thus far.  Mammogram in June 2023 - ABIGAIL " "2. No new issues.           Review of Systems   Constitutional, HEENT, cardiovascular, pulmonary, GI, , musculoskeletal, neuro, skin, endocrine and psych systems are negative, except as otherwise noted.      Objective   /80   Temp 96.8  F (36  C) (Temporal)   Ht 1.581 m (5' 2.25\")   Wt 108.4 kg (239 lb)   BMI 43.36 kg/m    Body mass index is 43.36 kg/m .  Physical Exam  Vitals reviewed.   HENT:      Head: Normocephalic.   Eyes:      Conjunctiva/sclera: Conjunctivae normal.   Cardiovascular:      Rate and Rhythm: Normal rate.      Pulses: Normal pulses.   Pulmonary:      Effort: Pulmonary effort is normal.   Chest:   Breasts:     Right: No nipple discharge, skin change or tenderness.      Left: No nipple discharge, skin change or tenderness.       Abdominal:      Palpations: Abdomen is soft.   Musculoskeletal:         General: Normal range of motion.      Cervical back: Normal range of motion.   Lymphadenopathy:      Upper Body:      Right upper body: No supraclavicular or axillary adenopathy.      Left upper body: No supraclavicular or axillary adenopathy.   Skin:     General: Skin is warm.      Capillary Refill: Capillary refill takes less than 2 seconds.   Neurological:      General: No focal deficit present.      Mental Status: She is alert.   Psychiatric:         Mood and Affect: Mood normal.                  Again, thank you for allowing me to participate in the care of your patient.        Sincerely,        [facility] MD Sanjana  "

## 2024-05-14 ENCOUNTER — PATIENT OUTREACH (OUTPATIENT)
Dept: CARE COORDINATION | Facility: CLINIC | Age: 69
End: 2024-05-14
Payer: COMMERCIAL

## 2024-06-11 ENCOUNTER — PATIENT OUTREACH (OUTPATIENT)
Dept: CARE COORDINATION | Facility: CLINIC | Age: 69
End: 2024-06-11
Payer: COMMERCIAL

## 2024-06-17 DIAGNOSIS — Z17.0 MALIGNANT NEOPLASM OF UPPER-INNER QUADRANT OF RIGHT BREAST IN FEMALE, ESTROGEN RECEPTOR POSITIVE (H): ICD-10-CM

## 2024-06-17 DIAGNOSIS — C50.211 MALIGNANT NEOPLASM OF UPPER-INNER QUADRANT OF RIGHT BREAST IN FEMALE, ESTROGEN RECEPTOR POSITIVE (H): ICD-10-CM

## 2024-06-17 RX ORDER — LETROZOLE 2.5 MG/1
2.5 TABLET, FILM COATED ORAL DAILY
Qty: 30 TABLET | Refills: 1 | Status: SHIPPED | OUTPATIENT
Start: 2024-06-17 | End: 2024-06-21

## 2024-06-17 NOTE — TELEPHONE ENCOUNTER
SUBJECTIVE/OBJECTIVE:                                                    Refill request received for:  letrozole (FEMARA) 2.5 MG tablet     Last refill per fax: Unknown (last ordered on 6/13/23, for a 12-month supply)  Date of LOV r/t Medication: 6/13/23  Future appt scheduled? No - patient is overdue for follow-up.  Request was sent to the scheduling pool this morning.   Date faxed to clinic: 6/17/24 (patient requested via telephone)    RECENT LABS/VITALS                                                      No recent lab results found.    BP Readings from Last 4 Encounters:   02/20/24 122/80   08/29/23 116/72   06/13/23 119/82   03/20/23 120/80     PLAN:                                                      Sent to provider to review and advise.    Austin Farr RN on 6/17/2024 at 10:49 AM

## 2024-06-20 NOTE — PROGRESS NOTES
Oncology Follow Up Visit: June 21, 2024    Oncologist: Dr. Colón   PCP: Ana Arboldea    Reason for Visit: Follow-up breast cancer     Diagnosis:   Oncology Treatment Summary  1.  6/24/2021 screening mammogram done an asymmetry with architectural distortion at the 12 o'clock position of the right breast.  Subsequent ultrasound was performed 6/30/2021 which found a 1.2 x 1.1 x 1.2 cm abnormality.  Biopsy was done which found an invasive ductal cancer grade 2 estrogen receptor 100% positive progesterone receptor 80% positive HER-2/shari negative via ODNNIE.  2.  On 7/22/2021 she underwent a right lumpectomy for a 1.5 cm grade 2 invasive ductal cancer.  Margins were negative.  2 lymph nodes were removed both of which were negative.  3.  Oncotype was sent off which showed a recurrence score of 11  4. Adjuvant radiation completed 10/8/21  5. Started adjuvant arimidex 10/20/21 - January 2022 stopped due to arthalgias  6. June 2022 started exemestane  7. June 2023 switched to Letrozole d/t cost of exemestane     Interval History:  Patient is seen in clinic today for review of letrozole. She is tolerating better with minimal arthralgias. Having upwards of 5 hot flashes per day. Does interrupt sleep but removes blankets and falls back to sleep without issue. No vaginal dryness or hair thinning. Does not have a PCP. Has not been very active lately d/t recent death of pet and rainy weather. Has otherwise been in good health. Does not perform self-breast exams. No concern regarding breasts or chest wall. No additional questions.     Patient denies any of the following except if noted above: fevers, chills, difficulty with energy, vision or hearing changes, chest pain, dyspnea, abdominal pain, nausea, vomiting, diarrhea, constipation, urinary concerns, headaches, numbness, tingling, issues with sleep or mood. She also denies lumps, bumps, rashes or skin lesions, bleeding or bruising issues.    Physical Exam:  /70 (BP  "Location: Right arm)   Pulse 95   Resp 16   Ht 1.581 m (5' 2.25\")   SpO2 96%   BMI 43.36 kg/m     BP Readings from Last 6 Encounters:   06/21/24 131/70   02/20/24 122/80   08/29/23 116/72   06/13/23 119/82   03/20/23 120/80   12/20/22 122/82     Wt Readings from Last 6 Encounters:   02/20/24 108.4 kg (239 lb)   08/29/23 102.1 kg (225 lb)   03/20/23 108.6 kg (239 lb 8 oz)   12/20/22 108.9 kg (240 lb)   06/21/22 107.5 kg (237 lb)   01/07/22 105.7 kg (233 lb)      Constitutional: No acute distress, pleasant, appropriately groomed.   ENT: PERRLA, sclera without erythema. Appropriate dentition.  Neck: Trachea midline, no adenopathy.   Resp: CTA, adequate depth and rate of respirations.   Cardiac: S1/S2, RRR, no murmurs.   Breasts: R) breast with previous lumpectomy scarring. No palpable masses or tenderness bilaterally, no nipple discharge.   Abdomen: BS active, abdomen soft and non-tender. No masses or organomegaly.   MS:  5/5 muscle strength, adequate ROM.   Skin: No rashes, lesions, or wounds on exposed skin.  Neuro: A/O x 4, sensation intact.   Lymph: No palpable anterior/posterior cervical, axillary, or supraclavicular nodes.   Psych: Appropriate mentation and affect.    Laboratory Results:   No results found for any visits on 06/21/24.  I reviewed the above labs today.    Imaging:  MA Screen Bilateral w/Akin  Narrative: Examination: Bilateral digital screening mammography and bilateral  digital tomosynthesis with computer aided detection.    Comparison: 6/10/2022, 7/22/2021, 6/24/2021    History/family history: No symptoms, routine screening.    TECHNIQUE:  Tomosynthesis    BREAST DENSITY: Scattered fibroglandular densities.    COMMENTS: Breast conservation therapy changes in the right breast. No  significant interval change. No radiographic evidence of malignancy.  Impression: IMPRESSION: BI-RADS CATEGORY: 2 - Benign.    RECOMMENDED FOLLOW-UP: Annual Mammography.    Results to be sent to the " patient.    FRIDA CASTELLANOS MD         SYSTEM ID:  [id number]    I reviewed the above imaging report today.    Assessment and Plan:   Right breast cancer ER/[place] positive, HER2 negative  - s/p lumpectomy and radiation, started Arimidex 10/2021 but discontinued 1/2022 d/t arthralgias, started on exemestane but subsequently stopped d/t cost. Switched to letrozole 6/2023.   - Tolerating fair with mentionable hot flashes, using Effexor 75 mg BID. Previously tried gabapentin at bedtime with no relief.   - Reviewed need for annual lipid monitoring with PCP, on simvastatin and last lipid check in 2021.   - No concern for return of disease based on clinical exam, mammogram results from today still pending.  - RTC in 6 months for clinical exam with DEXA completed prior.     Bone health   - DEXA 9/2021 with normal bone density, DEXA scan ordered today  - Taking vitamin D, advised to take calcium as well   - Encouraged weight bearing exercises as tolerated    Hypertension  - BP normal in clinic today  - On hydrochlorothiazide and lisinopril daily   - No concerning cardiac s/s       Marlen Rios - BON, CNP

## 2024-06-21 ENCOUNTER — ONCOLOGY VISIT (OUTPATIENT)
Dept: ONCOLOGY | Facility: CLINIC | Age: 69
End: 2024-06-21
Payer: COMMERCIAL

## 2024-06-21 ENCOUNTER — ANCILLARY PROCEDURE (OUTPATIENT)
Dept: MAMMOGRAPHY | Facility: CLINIC | Age: 69
End: 2024-06-21
Attending: PREVENTIVE MEDICINE
Payer: COMMERCIAL

## 2024-06-21 VITALS
HEART RATE: 95 BPM | BODY MASS INDEX: 43.36 KG/M2 | DIASTOLIC BLOOD PRESSURE: 70 MMHG | RESPIRATION RATE: 16 BRPM | OXYGEN SATURATION: 96 % | SYSTOLIC BLOOD PRESSURE: 131 MMHG | HEIGHT: 62 IN

## 2024-06-21 DIAGNOSIS — C50.211 MALIGNANT NEOPLASM OF UPPER-INNER QUADRANT OF RIGHT BREAST IN FEMALE, ESTROGEN RECEPTOR POSITIVE (H): Primary | ICD-10-CM

## 2024-06-21 DIAGNOSIS — Z79.811 LONG TERM (CURRENT) USE OF AROMATASE INHIBITORS: ICD-10-CM

## 2024-06-21 DIAGNOSIS — I10 HYPERTENSION GOAL BP (BLOOD PRESSURE) < 140/90: ICD-10-CM

## 2024-06-21 DIAGNOSIS — Z12.31 VISIT FOR SCREENING MAMMOGRAM: ICD-10-CM

## 2024-06-21 DIAGNOSIS — Z17.0 MALIGNANT NEOPLASM OF UPPER-INNER QUADRANT OF RIGHT BREAST IN FEMALE, ESTROGEN RECEPTOR POSITIVE (H): Primary | ICD-10-CM

## 2024-06-21 PROCEDURE — 99214 OFFICE O/P EST MOD 30 MIN: CPT

## 2024-06-21 PROCEDURE — G0463 HOSPITAL OUTPT CLINIC VISIT: HCPCS

## 2024-06-21 PROCEDURE — 77067 SCR MAMMO BI INCL CAD: CPT | Performed by: STUDENT IN AN ORGANIZED HEALTH CARE EDUCATION/TRAINING PROGRAM

## 2024-06-21 PROCEDURE — 77063 BREAST TOMOSYNTHESIS BI: CPT | Performed by: STUDENT IN AN ORGANIZED HEALTH CARE EDUCATION/TRAINING PROGRAM

## 2024-06-21 RX ORDER — LETROZOLE 2.5 MG/1
2.5 TABLET, FILM COATED ORAL DAILY
Qty: 90 TABLET | Refills: 3 | Status: SHIPPED | OUTPATIENT
Start: 2024-06-21

## 2024-06-21 ASSESSMENT — PAIN SCALES - GENERAL: PAINLEVEL: NO PAIN (0)

## 2024-06-21 NOTE — NURSING NOTE
"Oncology Rooming Note    June 21, 2024 12:33 PM   Roxie Woods is a 68 year old female who presents for:    Chief Complaint   Patient presents with    Oncology Clinic Visit     Initial Vitals: /70 (BP Location: Right arm)   Pulse 95   Resp 16   Ht 1.581 m (5' 2.25\")   SpO2 96%   BMI 43.36 kg/m   Estimated body mass index is 43.36 kg/m  as calculated from the following:    Height as of this encounter: 1.581 m (5' 2.25\").    Weight as of 2/20/24: 108.4 kg (239 lb). Body surface area is 2.18 meters squared.  No Pain (0) Comment: Data Unavailable   No LMP recorded. Patient is postmenopausal.  Allergies reviewed: Yes  Medications reviewed: Yes    Medications: Medication refills not needed today.  Pharmacy name entered into [facility]: [facility] [address] [place] [id number] [facility]    Frailty Screening:   Is the patient here for a new oncology consult visit in cancer care? 2. No      Clinical concerns: No Concerns        Lucien Alejandro MA            "

## 2024-06-21 NOTE — LETTER
6/21/2024      Roxie Woods  [address] [place] [address]      Dear Colleague,    Thank you for referring your patient, Roxie Woods, to the [facility]. Please see a copy of my visit note below.    Oncology Follow Up Visit: June 21, 2024    Oncologist: Dr. Colón   PCP: Ana Arboleda    Reason for Visit: Follow-up breast cancer     Diagnosis:   Oncology Treatment Summary  1.  6/24/2021 screening mammogram done an asymmetry with architectural distortion at the 12 o'clock position of the right breast.  Subsequent ultrasound was performed 6/30/2021 which found a 1.2 x 1.1 x 1.2 cm abnormality.  Biopsy was done which found an invasive ductal cancer grade 2 estrogen receptor 100% positive progesterone receptor 80% positive HER-2/shari negative via DONNIE.  2.  On 7/22/2021 she underwent a right lumpectomy for a 1.5 cm grade 2 invasive ductal cancer.  Margins were negative.  2 lymph nodes were removed both of which were negative.  3.  Oncotype was sent off which showed a recurrence score of 11  4. Adjuvant radiation completed 10/8/21  5. Started adjuvant arimidex 10/20/21 - January 2022 stopped due to arthalgias  6. June 2022 started exemestane  7. June 2023 switched to Letrozole d/t cost of exemestane     Interval History:  Patient is seen in clinic today for review of letrozole. She is tolerating better with minimal arthralgias. Having upwards of 5 hot flashes per day. Does interrupt sleep but removes blankets and falls back to sleep without issue. No vaginal dryness or hair thinning. Does not have a PCP. Has not been very active lately d/t recent death of pet and rainy weather. Has otherwise been in good health. Does not perform self-breast exams. No concern regarding breasts or chest wall. No additional questions.     Patient denies any of the following except if noted above: fevers, chills, difficulty with energy, vision or hearing changes, chest pain, dyspnea,  "abdominal pain, nausea, vomiting, diarrhea, constipation, urinary concerns, headaches, numbness, tingling, issues with sleep or mood. She also denies lumps, bumps, rashes or skin lesions, bleeding or bruising issues.    Physical Exam:  /70 (BP Location: Right arm)   Pulse 95   Resp 16   Ht 1.581 m (5' 2.25\")   SpO2 96%   BMI 43.36 kg/m     BP Readings from Last 6 Encounters:   06/21/24 131/70   02/20/24 122/80   08/29/23 116/72   06/13/23 119/82   03/20/23 120/80   12/20/22 122/82     Wt Readings from Last 6 Encounters:   02/20/24 108.4 kg (239 lb)   08/29/23 102.1 kg (225 lb)   03/20/23 108.6 kg (239 lb 8 oz)   12/20/22 108.9 kg (240 lb)   06/21/22 107.5 kg (237 lb)   01/07/22 105.7 kg (233 lb)      Constitutional: No acute distress, pleasant, appropriately groomed.   ENT: PERRLA, sclera without erythema. Appropriate dentition.  Neck: Trachea midline, no adenopathy.   Resp: CTA, adequate depth and rate of respirations.   Cardiac: S1/S2, RRR, no murmurs.   Breasts: R) breast with previous lumpectomy scarring. No palpable masses or tenderness bilaterally, no nipple discharge.   Abdomen: BS active, abdomen soft and non-tender. No masses or organomegaly.   MS:  5/5 muscle strength, adequate ROM.   Skin: No rashes, lesions, or wounds on exposed skin.  Neuro: A/O x 4, sensation intact.   Lymph: No palpable anterior/posterior cervical, axillary, or supraclavicular nodes.   Psych: Appropriate mentation and affect.    Laboratory Results:   No results found for any visits on 06/21/24.  I reviewed the above labs today.    Imaging:  MA Screen Bilateral w/Akin  Narrative: Examination: Bilateral digital screening mammography and bilateral  digital tomosynthesis with computer aided detection.    Comparison: 6/10/2022, 7/22/2021, 6/24/2021    History/family history: No symptoms, routine screening.    TECHNIQUE:  Tomosynthesis    BREAST DENSITY: Scattered fibroglandular densities.    COMMENTS: Breast conservation therapy " changes in the right breast. No  significant interval change. No radiographic evidence of malignancy.  Impression: IMPRESSION: BI-RADS CATEGORY: 2 - Benign.    RECOMMENDED FOLLOW-UP: Annual Mammography.    Results to be sent to the patient.    FRIDA CASTELLANOS MD         SYSTEM ID:  [id number]    I reviewed the above imaging report today.    Assessment and Plan:   Right breast cancer ER/[place] positive, HER2 negative  - s/p lumpectomy and radiation, started Arimidex 10/2021 but discontinued 1/2022 d/t arthralgias, started on exemestane but subsequently stopped d/t cost. Switched to letrozole 6/2023.   - Tolerating fair with mentionable hot flashes, using Effexor 75 mg BID. Previously tried gabapentin at bedtime with no relief.   - Reviewed need for annual lipid monitoring with PCP, on simvastatin and last lipid check in 2021.   - No concern for return of disease based on clinical exam, mammogram results from today still pending.  - RTC in 6 months for clinical exam with DEXA completed prior.     Bone health   - DEXA 9/2021 with normal bone density, DEXA scan ordered today  - Taking vitamin D, advised to take calcium as well   - Encouraged weight bearing exercises as tolerated    Hypertension  - BP normal in clinic today  - On hydrochlorothiazide and lisinopril daily   - No concerning cardiac s/s       Marlen CROCKETT, CNP      Again, thank you for allowing me to participate in the care of your patient.        Sincerely,        BON Montemayor CNP

## 2024-06-30 ENCOUNTER — HEALTH MAINTENANCE LETTER (OUTPATIENT)
Age: 69
End: 2024-06-30

## 2024-07-24 DIAGNOSIS — I10 HYPERTENSION GOAL BP (BLOOD PRESSURE) < 140/90: ICD-10-CM

## 2024-07-24 RX ORDER — HYDROCHLOROTHIAZIDE 25 MG/1
25 TABLET ORAL DAILY
Qty: 90 TABLET | Refills: 3 | Status: SHIPPED | OUTPATIENT
Start: 2024-07-24

## 2024-07-24 RX ORDER — LISINOPRIL 10 MG/1
TABLET ORAL
Qty: 90 TABLET | Refills: 3 | Status: SHIPPED | OUTPATIENT
Start: 2024-07-24

## 2024-07-24 NOTE — TELEPHONE ENCOUNTER
SUBJECTIVE/OBJECTIVE:                                                    Refill request receive for:  HCTZ    Last refill per fax: 4/8/24  Date of LOV: 6/21/24  Future appt scheduled? Yes: 12/20/24   Date faxed to clinic: 7/22/24    PLAN:                                                      Sent to provider to advise.

## 2024-12-13 ENCOUNTER — HOSPITAL ENCOUNTER (OUTPATIENT)
Dept: BONE DENSITY | Facility: CLINIC | Age: 69
Discharge: HOME | End: 2024-12-13
Payer: COMMERCIAL

## 2024-12-13 DIAGNOSIS — C50.211 MALIGNANT NEOPLASM OF UPPER-INNER QUADRANT OF RIGHT BREAST IN FEMALE, ESTROGEN RECEPTOR POSITIVE (H): ICD-10-CM

## 2024-12-13 DIAGNOSIS — Z79.811 LONG TERM (CURRENT) USE OF AROMATASE INHIBITORS: ICD-10-CM

## 2024-12-13 DIAGNOSIS — Z17.0 MALIGNANT NEOPLASM OF UPPER-INNER QUADRANT OF RIGHT BREAST IN FEMALE, ESTROGEN RECEPTOR POSITIVE (H): ICD-10-CM

## 2024-12-13 PROCEDURE — 77080 DXA BONE DENSITY AXIAL: CPT

## 2024-12-23 ENCOUNTER — ONCOLOGY VISIT (OUTPATIENT)
Dept: ONCOLOGY | Facility: CLINIC | Age: 69
End: 2024-12-23
Payer: COMMERCIAL

## 2024-12-23 VITALS
HEART RATE: 95 BPM | SYSTOLIC BLOOD PRESSURE: 112 MMHG | OXYGEN SATURATION: 97 % | HEIGHT: 62 IN | BODY MASS INDEX: 43.36 KG/M2 | DIASTOLIC BLOOD PRESSURE: 73 MMHG

## 2024-12-23 DIAGNOSIS — Z12.31 ENCOUNTER FOR SCREENING MAMMOGRAM FOR BREAST CANCER: ICD-10-CM

## 2024-12-23 DIAGNOSIS — M85.80 OSTEOPENIA, UNSPECIFIED LOCATION: ICD-10-CM

## 2024-12-23 DIAGNOSIS — C50.211 MALIGNANT NEOPLASM OF UPPER-INNER QUADRANT OF RIGHT BREAST IN FEMALE, ESTROGEN RECEPTOR POSITIVE (H): Primary | ICD-10-CM

## 2024-12-23 DIAGNOSIS — I10 HYPERTENSION GOAL BP (BLOOD PRESSURE) < 140/90: ICD-10-CM

## 2024-12-23 DIAGNOSIS — Z12.11 SPECIAL SCREENING FOR MALIGNANT NEOPLASMS, COLON: ICD-10-CM

## 2024-12-23 DIAGNOSIS — Z79.811 LONG TERM (CURRENT) USE OF AROMATASE INHIBITORS: ICD-10-CM

## 2024-12-23 DIAGNOSIS — Z17.0 MALIGNANT NEOPLASM OF UPPER-INNER QUADRANT OF RIGHT BREAST IN FEMALE, ESTROGEN RECEPTOR POSITIVE (H): Primary | ICD-10-CM

## 2024-12-23 PROCEDURE — 99214 OFFICE O/P EST MOD 30 MIN: CPT | Performed by: NURSE PRACTITIONER

## 2024-12-23 PROCEDURE — G0463 HOSPITAL OUTPT CLINIC VISIT: HCPCS | Performed by: NURSE PRACTITIONER

## 2024-12-23 ASSESSMENT — PAIN SCALES - GENERAL: PAINLEVEL_OUTOF10: NO PAIN (0)

## 2024-12-23 NOTE — LETTER
12/23/2024      Roxie Woods  [id number]   [place] [address]      Dear Colleague,    Thank you for referring your patient, Roxie Woods, to the [facility]. Please see a copy of my visit note below.    Oncology Follow Up Visit: December 23, 2024    Oncologist: Dr Colón  PCP: Ana Arboleda    Diagnosis: Right Breast Cancer  Roxie Woods is a 70 yo female   6/24/2021 screening mammogram done an asymmetry with architectural distortion at the 12 o'clock position of the right breast.  Subsequent ultrasound was performed 6/30/2021 which found a 1.2 x 1.1 x 1.2 cm abnormality.  Biopsy was done which found an invasive ductal cancer grade 2 estrogen receptor 100% positive progesterone receptor 80% positive HER-2/shari negative via DONNIE.  On 7/22/2021 she underwent a right lumpectomy for a 1.5 cm grade 2 invasive ductal cancer.  Margins were negative.  2 lymph nodes were removed both of which were negative.  Oncotype was sent off which showed a recurrence score of 11  Treatment:  7/22/2021 Right lumpectomy with SLN biopsy.   10/8/21 completed Adjuvant radiation   Started adjuvant arimidex 10/20/21 - January 2022 stopped due to arthalgias  6/2022 started exemestane but stopped with side effects.   9/2024 stopped letrozole    Interval History: Ms Bernal comes to clinic for review of her breast cancer. Pt states she just could not tolerate the hormone therapy and so continues with observation for recurrence. She denies any new symptoms of new masses, discharge or new pains to the breasts or chest. She has been otherwise healthy. She is not exercising regularly.   Rest of comprehensive and complete ROS is reviewed and is negative.   Past Medical History:   Diagnosis Date     DUB (dysfunctional uterine bleeding)      Essential hypertension      Factor V Leiden mutation (H)      Malignant neoplasm of right breast in female, estrogen receptor positive (H) 06/30/2021      "Menopausal disorder      Current Outpatient Medications   Medication Sig Dispense Refill     hydrochlorothiazide (HYDRODIURIL) 25 MG tablet Take 1 tablet (25 mg) by mouth daily 90 tablet 3     lisinopril (ZESTRIL) 10 MG tablet TAKE ONE TABLET BY MOUTH ONE TIME DAILY 90 tablet 3     multivitamin w/minerals (THERA-VIT-M) tablet Take 2 tablets by mouth daily       simvastatin (ZOCOR) 20 MG tablet TAKE ONE TABLET BY MOUTH AT BEDTIME 90 tablet 3     venlafaxine (EFFEXOR) 75 MG tablet Take 1 tablet (75 mg) by mouth 2 times daily 180 tablet 3     VITAMIN D PO Take 1 tablet by mouth daily D3       No current facility-administered medications for this visit.     Allergies   Allergen Reactions     Zoloft Rash       Physical Exam:/73 (BP Location: Right arm)   Pulse 95   Ht 1.581 m (5' 2.25\")   SpO2 97%   BMI 43.36 kg/m     ECOG PS- 0  Constitutional: Alert, cooperative, and in no distress. Obese.   ENT: Eyes bright , No mouth sores  Neck: Supple, No adenopathy.  Cardiac: Heart rate and rhythm is regular and strong without murmur  Respiratory: Breathing easy. Lung sounds clear to auscultation  Breasts:BIlaterally breasts without new masses or tenderness and no current pain with exam - no lymphedema.   GI: Abdomen is soft, non-tender, BS normal. No masses or organomegaly  MS: Muscle tone normal, extremities normal with no edema.   Skin: No suspicious lesions or rashes  Neuro: Sensory grossly WNL, gait normal.   Lymph: Normal ant/post cervical, axillary, supraclavicular nodes  Psych: Mentation appears normal and affect normal/bright with good conversation.     Laboratory/Imaging Results:   Narrative & Impression   EXAM: DX AXIAL HIPS/SPINE  LOCATION: [facility]  DATE: 12/13/2024     INDICATION: BMD screening, follow-up. On letrozole.  DEMOGRAPHICS: Age- 69 years. Gender- Female. Menopausal status- Postmenopausal.  COMPARISON: 9/10/2021  TECHNIQUE: Dual-energy x-ray absorptiometry (DXA) " performed with routine technique.      FINDINGS:     DXA RESULTS  -Lumbar Spine: L1-L3: BMD: 1.010 g/cm2. T-score: -1.3. Z-score: 0.9. L4 omitted from lumbar spine due to greater than 1.0 T-score difference from adjacent vertebrae. This is likely due to degenerative changes, which may artifactually increase BMD.  -RIGHT Hip Total: BMD: 1.010 g/cm2. T-score: 0.0. Z-score: 0.6.  -RIGHT Hip Femoral neck: BMD: 0.961 g/cm2. T-score: -0.6. Z-score: 0.3.  -LEFT Hip Total: BMD: 1.067 g/cm2. T-score: 0.5. Z-score: 1.0.  -LEFT Hip Femoral neck: BMD: 0.993 g/cm2. T-score: -0.3. Z-score: 0.5.     WHO T-SCORE CRITERIA  -Normal: T score at or above -1 SD  -Osteopenia: T score between -1 and -2.5 SD  -Osteoporosis: T score at or below -2.5 SD     The World Health Organization (WHO) criteria is applicable to perimenopausal females, postmenopausal females, and men aged 50 years or older.     INTERVAL CHANGE  -There has been a 7.8% decrease in lumbar spine BMD.  -There has been a 4.4% decrease in bilateral hip BMD.     FRACTURE RISK  -The FRAX risk calculator is not applicable due to medication that is used for treatment of osteopenia/osteoporosis or can otherwise affect bone mineral density.                                                                      IMPRESSION: Low bone density (OSTEOPENIA). T score meets the WHO criteria for low bone density (osteopenia) at one or more measured sites. The risk of osteoporotic fracture increases approximately two-fold for each standard deviation decrease in T-score     Assessment and Plan:   Right hormone positive breast cancer- Pt completed lumpectomy with SLN biopsy and attempted use of anastozole. Letrozole and exemestane but felt adverse effects and is refusing further attempts with Tamoxifen - she did complete over 2 years of use of hormone therapy with no current sign of recurrence of disease with review and exam today.   -last mammogram was 6/21/2024- repeat in spring with next  visit.   Return to clinic in 6 months with mammogram prior for review. No labs necessary.     Osteopenia- Recent DEXA showing Osteopenia Pt is not currently on AI therapy and is working on daily use of adequate calcium in diet with multivitamin and has added vitamin D to support bones. Discussed developing a plan for weight bearing exercises.     Hypertension - well controlled with lisinopril and hydrochlorothiazide. Refills of these medications should return to PCP. Asked to establish with new PCP for best support of general health.     The total time of this encounter amounted to  40 minutes. This time included face to face time spent with the patient, prep work, ordering tests, and performing post visit documentation.  Hoda Travis Cnp      Again, thank you for allowing me to participate in the care of your patient.        Sincerely,        Hoda Travis NP, APRN CNP    Electronically signed

## 2024-12-23 NOTE — NURSING NOTE
"Oncology Rooming Note    December 23, 2024 2:36 PM   Roxie Woods is a 69 year old female who presents for:    Chief Complaint   Patient presents with    Oncology Clinic Visit     Initial Vitals: /73 (BP Location: Right arm)   Pulse 95   Ht 1.581 m (5' 2.25\")   SpO2 97%   BMI 43.36 kg/m   Estimated body mass index is 43.36 kg/m  as calculated from the following:    Height as of this encounter: 1.581 m (5' 2.25\").    Weight as of 2/20/24: 108.4 kg (239 lb). Body surface area is 2.18 meters squared.  No Pain (0) Comment: Data Unavailable   No LMP recorded. Patient is postmenopausal.  Allergies reviewed: Yes  Medications reviewed: Yes    Medications: Medication refills not needed today.  Pharmacy name entered into [employer]: [facility] [address] [place] [id number] [facility]    Frailty Screening:   Is the patient here for a new oncology consult visit in cancer care? 2. No      Clinical concerns: No Concerns        [place]ien Alejandro MA            "

## 2024-12-23 NOTE — PROGRESS NOTES
Oncology Follow Up Visit: December 23, 2024    Oncologist: Dr Colón  PCP: Ana Arboleda    Diagnosis: Right Breast Cancer  Roxie Woods is a 70 yo female   6/24/2021 screening mammogram done an asymmetry with architectural distortion at the 12 o'clock position of the right breast.  Subsequent ultrasound was performed 6/30/2021 which found a 1.2 x 1.1 x 1.2 cm abnormality.  Biopsy was done which found an invasive ductal cancer grade 2 estrogen receptor 100% positive progesterone receptor 80% positive HER-2/shari negative via DONNIE.  On 7/22/2021 she underwent a right lumpectomy for a 1.5 cm grade 2 invasive ductal cancer.  Margins were negative.  2 lymph nodes were removed both of which were negative.  Oncotype was sent off which showed a recurrence score of 11  Treatment:  7/22/2021 Right lumpectomy with SLN biopsy.   10/8/21 completed Adjuvant radiation   Started adjuvant arimidex 10/20/21 - January 2022 stopped due to arthalgias  6/2022 started exemestane but stopped with side effects.   9/2024 stopped letrozole    Interval History: Ms Bernal comes to clinic for review of her breast cancer. Pt states she just could not tolerate the hormone therapy and so continues with observation for recurrence. She denies any new symptoms of new masses, discharge or new pains to the breasts or chest. She has been otherwise healthy. She is not exercising regularly.   Rest of comprehensive and complete ROS is reviewed and is negative.   Past Medical History:   Diagnosis Date    DUB (dysfunctional uterine bleeding)     Essential hypertension     Factor V Leiden mutation (H)     Malignant neoplasm of right breast in female, estrogen receptor positive (H) 06/30/2021    Menopausal disorder      Current Outpatient Medications   Medication Sig Dispense Refill    hydrochlorothiazide (HYDRODIURIL) 25 MG tablet Take 1 tablet (25 mg) by mouth daily 90 tablet 3    lisinopril (ZESTRIL) 10 MG tablet TAKE ONE TABLET BY MOUTH ONE TIME  "DAILY 90 tablet 3    multivitamin w/minerals (THERA-VIT-M) tablet Take 2 tablets by mouth daily      simvastatin (ZOCOR) 20 MG tablet TAKE ONE TABLET BY MOUTH AT BEDTIME 90 tablet 3    venlafaxine (EFFEXOR) 75 MG tablet Take 1 tablet (75 mg) by mouth 2 times daily 180 tablet 3    VITAMIN D PO Take 1 tablet by mouth daily D3       No current facility-administered medications for this visit.     Allergies   Allergen Reactions    Zoloft Rash       Physical Exam:/73 (BP Location: Right arm)   Pulse 95   Ht 1.581 m (5' 2.25\")   SpO2 97%   BMI 43.36 kg/m     ECOG PS- 0  Constitutional: Alert, cooperative, and in no distress. Obese.   ENT: Eyes bright , No mouth sores  Neck: Supple, No adenopathy.  Cardiac: Heart rate and rhythm is regular and strong without murmur  Respiratory: Breathing easy. Lung sounds clear to auscultation  Breasts:BIlaterally breasts without new masses or tenderness and no current pain with exam - no lymphedema.   GI: Abdomen is soft, non-tender, BS normal. No masses or organomegaly  MS: Muscle tone normal, extremities normal with no edema.   Skin: No suspicious lesions or rashes  Neuro: Sensory grossly WNL, gait normal.   Lymph: Normal ant/post cervical, axillary, supraclavicular nodes  Psych: Mentation appears normal and affect normal/bright with good conversation.     Laboratory/Imaging Results:   Narrative & Impression   EXAM: DX AXIAL HIPS/SPINE  LOCATION: [facility]  DATE: 12/13/2024     INDICATION: BMD screening, follow-up. On letrozole.  DEMOGRAPHICS: Age- 69 years. Gender- Female. Menopausal status- Postmenopausal.  COMPARISON: 9/10/2021  TECHNIQUE: Dual-energy x-ray absorptiometry (DXA) performed with routine technique.      FINDINGS:     DXA RESULTS  -Lumbar Spine: L1-L3: BMD: 1.010 g/cm2. T-score: -1.3. Z-score: 0.9. L4 omitted from lumbar spine due to greater than 1.0 T-score difference from adjacent vertebrae. This is likely due to degenerative " changes, which may artifactually increase BMD.  -RIGHT Hip Total: BMD: 1.010 g/cm2. T-score: 0.0. Z-score: 0.6.  -RIGHT Hip Femoral neck: BMD: 0.961 g/cm2. T-score: -0.6. Z-score: 0.3.  -LEFT Hip Total: BMD: 1.067 g/cm2. T-score: 0.5. Z-score: 1.0.  -LEFT Hip Femoral neck: BMD: 0.993 g/cm2. T-score: -0.3. Z-score: 0.5.     WHO T-SCORE CRITERIA  -Normal: T score at or above -1 SD  -Osteopenia: T score between -1 and -2.5 SD  -Osteoporosis: T score at or below -2.5 SD     The World Health Organization (WHO) criteria is applicable to perimenopausal females, postmenopausal females, and men aged 50 years or older.     INTERVAL CHANGE  -There has been a 7.8% decrease in lumbar spine BMD.  -There has been a 4.4% decrease in bilateral hip BMD.     FRACTURE RISK  -The FRAX risk calculator is not applicable due to medication that is used for treatment of osteopenia/osteoporosis or can otherwise affect bone mineral density.                                                                      IMPRESSION: Low bone density (OSTEOPENIA). T score meets the WHO criteria for low bone density (osteopenia) at one or more measured sites. The risk of osteoporotic fracture increases approximately two-fold for each standard deviation decrease in T-score     Assessment and Plan:   Right hormone positive breast cancer- Pt completed lumpectomy with SLN biopsy and attempted use of anastozole. Letrozole and exemestane but felt adverse effects and is refusing further attempts with Tamoxifen - she did complete over 2 years of use of hormone therapy with no current sign of recurrence of disease with review and exam today.   -last mammogram was 6/21/2024- repeat in spring with next visit.   Return to clinic in 6 months with mammogram prior for review. No labs necessary.     Osteopenia- Recent DEXA showing Osteopenia Pt is not currently on AI therapy and is working on daily use of adequate calcium in diet with multivitamin and has added vitamin D to  support bones. Discussed developing a plan for weight bearing exercises.     Hypertension - well controlled with lisinopril and hydrochlorothiazide. Refills of these medications should return to PCP. Asked to establish with new PCP for best support of general health.     The total time of this encounter amounted to  40 minutes. This time included face to face time spent with the patient, prep work, ordering tests, and performing post visit documentation.  Hoda Travis,Cnp

## 2025-01-08 DIAGNOSIS — E78.5 HYPERLIPIDEMIA LDL GOAL <130: ICD-10-CM

## 2025-01-08 DIAGNOSIS — F32.5 MAJOR DEPRESSION IN REMISSION: ICD-10-CM

## 2025-01-08 RX ORDER — SIMVASTATIN 20 MG
TABLET ORAL
Qty: 90 TABLET | Refills: 0 | Status: SHIPPED | OUTPATIENT
Start: 2025-01-08

## 2025-01-08 RX ORDER — VENLAFAXINE 75 MG/1
75 TABLET ORAL 2 TIMES DAILY
Qty: 180 TABLET | Refills: 0 | Status: SHIPPED | OUTPATIENT
Start: 2025-01-08

## 2025-01-17 ENCOUNTER — ANCILLARY PROCEDURE (OUTPATIENT)
Dept: GENERAL RADIOLOGY | Facility: CLINIC | Age: 70
End: 2025-01-17
Attending: PREVENTIVE MEDICINE
Payer: COMMERCIAL

## 2025-01-17 DIAGNOSIS — G89.29 CHRONIC PAIN OF LEFT KNEE: ICD-10-CM

## 2025-01-17 DIAGNOSIS — M25.562 CHRONIC PAIN OF LEFT KNEE: ICD-10-CM

## 2025-01-17 PROCEDURE — 73562 X-RAY EXAM OF KNEE 3: CPT | Mod: TC | Performed by: STUDENT IN AN ORGANIZED HEALTH CARE EDUCATION/TRAINING PROGRAM

## 2025-01-23 NOTE — RESULT ENCOUNTER NOTE
Roxie,     X rays did not show any acute bony abnormalities.   No fluid in the joint seen.   If pain is persistent then I would recommend seeing Orthopedics for further evaluation.     Please do not hesitate to call us at [phone] if you have any questions or concerns.    Thank you,    Ana Arboleda MD MPH

## 2025-04-06 DIAGNOSIS — E78.5 HYPERLIPIDEMIA LDL GOAL <130: ICD-10-CM

## 2025-04-07 RX ORDER — SIMVASTATIN 20 MG
TABLET ORAL
Qty: 90 TABLET | Refills: 2 | Status: SHIPPED | OUTPATIENT
Start: 2025-04-07

## 2025-04-10 DIAGNOSIS — F32.5 MAJOR DEPRESSION IN REMISSION: ICD-10-CM

## 2025-04-10 RX ORDER — VENLAFAXINE 75 MG/1
75 TABLET ORAL 2 TIMES DAILY
Qty: 180 TABLET | Refills: 0 | Status: SHIPPED | OUTPATIENT
Start: 2025-04-10

## 2025-04-23 ENCOUNTER — TELEPHONE (OUTPATIENT)
Dept: FAMILY MEDICINE | Facility: CLINIC | Age: 70
End: 2025-04-23
Payer: COMMERCIAL

## 2025-04-23 NOTE — TELEPHONE ENCOUNTER
Patient Quality Outreach    Patient is due for the following:   Colon Cancer Screening  Physical Annual Wellness Visit      Topic Date Due    Pneumococcal Vaccine (2 of 2 - PCV) 04/24/2019    Diptheria Tetanus Pertussis (DTAP/TDAP/TD) Vaccine (2 - Td or Tdap) 01/28/2024    Flu Vaccine (1) 09/01/2024       Action(s) Taken:   Schedule a Annual Wellness Visit    Type of outreach:    Sent [employer] message.    Questions for provider review:    None         Elaine Galaviz MA  Chart routed to None.

## 2025-05-22 ENCOUNTER — PATIENT OUTREACH (OUTPATIENT)
Dept: CARE COORDINATION | Facility: CLINIC | Age: 70
End: 2025-05-22
Payer: COMMERCIAL

## 2025-06-25 DIAGNOSIS — Z17.0 MALIGNANT NEOPLASM OF UPPER-INNER QUADRANT OF RIGHT BREAST IN FEMALE, ESTROGEN RECEPTOR POSITIVE (H): Primary | ICD-10-CM

## 2025-06-25 DIAGNOSIS — Z12.31 ENCOUNTER FOR SCREENING MAMMOGRAM FOR BREAST CANCER: ICD-10-CM

## 2025-06-25 DIAGNOSIS — C50.211 MALIGNANT NEOPLASM OF UPPER-INNER QUADRANT OF RIGHT BREAST IN FEMALE, ESTROGEN RECEPTOR POSITIVE (H): Primary | ICD-10-CM

## 2025-06-25 NOTE — PROGRESS NOTES
Pt has appt for mammo on 06/27 ordered by Hoda DEXTER. Your appt with her is 07/11. Can you sign new order?    Thanks!    Sue

## 2025-06-27 ENCOUNTER — HOSPITAL ENCOUNTER (OUTPATIENT)
Dept: MAMMOGRAPHY | Facility: CLINIC | Age: 70
Discharge: HOME | End: 2025-06-27
Attending: NURSE PRACTITIONER | Admitting: NURSE PRACTITIONER
Payer: COMMERCIAL

## 2025-06-27 DIAGNOSIS — Z12.31 ENCOUNTER FOR SCREENING MAMMOGRAM FOR BREAST CANCER: ICD-10-CM

## 2025-06-27 DIAGNOSIS — Z17.0 MALIGNANT NEOPLASM OF UPPER-INNER QUADRANT OF RIGHT BREAST IN FEMALE, ESTROGEN RECEPTOR POSITIVE (H): ICD-10-CM

## 2025-06-27 DIAGNOSIS — C50.211 MALIGNANT NEOPLASM OF UPPER-INNER QUADRANT OF RIGHT BREAST IN FEMALE, ESTROGEN RECEPTOR POSITIVE (H): ICD-10-CM

## 2025-06-27 PROCEDURE — 77067 SCR MAMMO BI INCL CAD: CPT

## 2025-06-27 PROCEDURE — 82274 ASSAY TEST FOR BLOOD FECAL: CPT | Performed by: NURSE PRACTITIONER

## 2025-06-30 LAB — HEMOCCULT STL QL IA: NEGATIVE

## 2025-07-08 DIAGNOSIS — F32.5 MAJOR DEPRESSION IN REMISSION: ICD-10-CM

## 2025-07-08 DIAGNOSIS — I10 HYPERTENSION GOAL BP (BLOOD PRESSURE) < 140/90: ICD-10-CM

## 2025-07-08 RX ORDER — LISINOPRIL 10 MG/1
10 TABLET ORAL DAILY
Qty: 90 TABLET | Refills: 0 | Status: SHIPPED | OUTPATIENT
Start: 2025-07-08

## 2025-07-08 RX ORDER — VENLAFAXINE 75 MG/1
75 TABLET ORAL 2 TIMES DAILY
Qty: 180 TABLET | Refills: 1 | Status: SHIPPED | OUTPATIENT
Start: 2025-07-08

## 2025-07-08 RX ORDER — HYDROCHLOROTHIAZIDE 25 MG/1
25 TABLET ORAL DAILY
Qty: 90 TABLET | Refills: 0 | Status: SHIPPED | OUTPATIENT
Start: 2025-07-08

## 2025-07-11 ENCOUNTER — ONCOLOGY VISIT (OUTPATIENT)
Dept: ONCOLOGY | Facility: CLINIC | Age: 70
End: 2025-07-11
Payer: COMMERCIAL

## 2025-07-13 ENCOUNTER — HEALTH MAINTENANCE LETTER (OUTPATIENT)
Age: 70
End: 2025-07-13

## 2025-07-18 ENCOUNTER — ONCOLOGY VISIT (OUTPATIENT)
Dept: ONCOLOGY | Facility: CLINIC | Age: 70
End: 2025-07-18
Attending: NURSE PRACTITIONER
Payer: COMMERCIAL

## 2025-07-21 ENCOUNTER — PATIENT OUTREACH (OUTPATIENT)
Dept: CARE COORDINATION | Facility: CLINIC | Age: 70
End: 2025-07-21
Payer: COMMERCIAL

## 2025-07-22 ASSESSMENT — ACTIVITIES OF DAILY LIVING (ADL)
HOW_WOULD_YOU_RATE_THE_CURRENT_FUNCTION_OF_YOUR_KNEE_DURING_YOUR_USUAL_DAILY_ACTIVITIES_ON_A_SCALE_FROM_0_TO_100_WITH_100_BEING_YOUR_LEVEL_OF_KNEE_FUNCTION_PRIOR_TO_YOUR_INJURY_AND_0_BEING_THE_INABILITY_TO_PERFORM_ANY_OF_YOUR_USUAL_DAILY_ACTIVITIES?: 40
GO DOWN STAIRS: ACTIVITY IS FAIRLY DIFFICULT
AS_A_RESULT_OF_YOUR_KNEE_INJURY,_HOW_WOULD_YOU_RATE_YOUR_CURRENT_LEVEL_OF_DAILY_ACTIVITY?: ABNORMAL
LIMPING: THE SYMPTOM AFFECTS MY ACTIVITY SEVERELY
GIVING WAY, BUCKLING OR SHIFTING OF KNEE: THE SYMPTOM AFFECTS MY ACTIVITY MODERATELY
SIT WITH YOUR KNEE BENT: ACTIVITY IS VERY DIFFICULT
WALK: ACTIVITY IS FAIRLY DIFFICULT
AS_A_RESULT_OF_YOUR_KNEE_INJURY,_HOW_WOULD_YOU_RATE_YOUR_CURRENT_LEVEL_OF_DAILY_ACTIVITY?: ABNORMAL
LIMPING: THE SYMPTOM AFFECTS MY ACTIVITY SEVERELY
HOW_WOULD_YOU_RATE_THE_OVERALL_FUNCTION_OF_YOUR_KNEE_DURING_YOUR_USUAL_DAILY_ACTIVITIES?: ABNORMAL
SQUAT: I AM UNABLE TO DO THE ACTIVITY
RISE FROM A CHAIR: ACTIVITY IS VERY DIFFICULT
KNEEL ON THE FRONT OF YOUR KNEE: I AM UNABLE TO DO THE ACTIVITY
WEAKNESS: THE SYMPTOM AFFECTS MY ACTIVITY MODERATELY
RAW_SCORE: 20
WALK: ACTIVITY IS FAIRLY DIFFICULT
SQUAT: I AM UNABLE TO DO THE ACTIVITY
SWELLING: THE SYMPTOM AFFECTS MY ACTIVITY MODERATELY
GO UP STAIRS: ACTIVITY IS VERY DIFFICULT
PLEASE_INDICATE_YOR_PRIMARY_REASON_FOR_REFERRAL_TO_THERAPY:: KNEE
RISE FROM A CHAIR: ACTIVITY IS VERY DIFFICULT
GO DOWN STAIRS: ACTIVITY IS FAIRLY DIFFICULT
HOW_WOULD_YOU_RATE_THE_CURRENT_FUNCTION_OF_YOUR_KNEE_DURING_YOUR_USUAL_DAILY_ACTIVITIES_ON_A_SCALE_FROM_0_TO_100_WITH_100_BEING_YOUR_LEVEL_OF_KNEE_FUNCTION_PRIOR_TO_YOUR_INJURY_AND_0_BEING_THE_INABILITY_TO_PERFORM_ANY_OF_YOUR_USUAL_DAILY_ACTIVITIES?: 40
WEAKNESS: THE SYMPTOM AFFECTS MY ACTIVITY MODERATELY
GIVING WAY, BUCKLING OR SHIFTING OF KNEE: THE SYMPTOM AFFECTS MY ACTIVITY MODERATELY
SWELLING: THE SYMPTOM AFFECTS MY ACTIVITY MODERATELY
PAIN: THE SYMPTOM AFFECTS MY ACTIVITY MODERATELY
GO UP STAIRS: ACTIVITY IS VERY DIFFICULT
HOW_WOULD_YOU_RATE_THE_OVERALL_FUNCTION_OF_YOUR_KNEE_DURING_YOUR_USUAL_DAILY_ACTIVITIES?: ABNORMAL
KNEE_ACTIVITY_OF_DAILY_LIVING_SCORE: 28.57
STIFFNESS: THE SYMPTOM AFFECTS MY ACTIVITY MODERATELY
STIFFNESS: THE SYMPTOM AFFECTS MY ACTIVITY MODERATELY
STAND: ACTIVITY IS FAIRLY DIFFICULT
KNEEL ON THE FRONT OF YOUR KNEE: I AM UNABLE TO DO THE ACTIVITY
SIT WITH YOUR KNEE BENT: ACTIVITY IS VERY DIFFICULT
PAIN: THE SYMPTOM AFFECTS MY ACTIVITY MODERATELY
STAND: ACTIVITY IS FAIRLY DIFFICULT
KNEE_ACTIVITY_OF_DAILY_LIVING_SUM: 20

## 2025-07-24 ENCOUNTER — THERAPY VISIT (OUTPATIENT)
Dept: PHYSICAL THERAPY | Facility: CLINIC | Age: 70
End: 2025-07-24
Payer: COMMERCIAL

## 2025-07-24 DIAGNOSIS — G89.29 CHRONIC PAIN OF LEFT KNEE: ICD-10-CM

## 2025-07-24 DIAGNOSIS — M54.16 LUMBAR BACK PAIN WITH RADICULOPATHY AFFECTING LEFT LOWER EXTREMITY: Primary | ICD-10-CM

## 2025-07-24 DIAGNOSIS — M25.562 CHRONIC PAIN OF LEFT KNEE: ICD-10-CM

## 2025-07-24 NOTE — PROGRESS NOTES
PHYSICAL THERAPY EVALUATION  Type of Visit: Evaluation       Fall Risk Screen:  Have you fallen 2 or more times in the past year?: Yes  Have you fallen and had an injury in the past year?: Yes  Timed Up and Go score (seconds): 12 sec  Is patient receiving Physical Therapy Services?: No    Subjective         Presenting condition or subjective complaint: Knee  Date of onset: 07/18/25 (PT referral date; has had knee symptoms for the past 2-3 years)    Relevant medical history:     Dates & types of surgery:      Prior diagnostic imaging/testing results: X-ray     Prior therapy history for the same diagnosis, illness or injury: No      Prior Level of Function  Transfers: Independent  Ambulation: Independent  ADL: Independent  IADL:     Living Environment  Social support: With family members   Type of home: Townhome; Basement   Stairs to enter the home: Yes 3 Is there a railing: No     Ramp: No   Stairs inside the home: Yes 10 Is there a railing: Yes     Help at home: None  Equipment owned:       Employment: Yes   Hobbies/Interests: Walking my dog    Patient goals for therapy: Be normal    Pain assessment: See objective evaluation for additional pain details     Objective   KNEE EVALUATION  GAIT:  Weightbearing Status: WBAT  Assistive Device(s): None  Gait Deviations: Antalgic  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  She will have tightness with trying to bend her knee.  With walking she will develop L hip, back and anterior shin symptoms but the knee will tend to improve.    Pain quality: ache, sharp, shooting   Symptoms (improving/unchanging/worsening):  worsening.    Symptoms commenced as a result of: unknown to possibly due taking a  statin and/or a cancer medication,  but a flare with tripped on a threshold in mid/late May 2025     Symptoms at onset: L knee  Paresthesia (yes/no):  no  Spinal history: one episode of back spasms 10-11 years ago but no further episodes     Cough/Sneeze (pos/neg):  negative     Constant symptoms: anterior L knee   Intermittent symptoms: L shin, posterior hip and lower back     Symptoms are worse with the following: stairs (up worse than down), lift the leg to go up a curb, rise from sitting, driving and getting out of the vehicle, squatting, kneeling, as the days, prolonged sitting.  Can wake her at night and change positions and goes back to sleep (3-4x/night).  Not able to lay on the left hip.  Her back, hip and lower leg symptoms will worsen with walking.     Symptoms are better with the following: Always On the move and Time of day - Always AM    Continued use makes the pain (better/worse/no effect): worse    Disturbed night (yes/no): yes      Pain at rest (yes/no):  yes    Site (back/hip/knee/ankle/foot):  back/knee    Other questions (swelling/clicking/locking/giving way/falling):  her knee has buckled on her 5-6 times      Previous episodes: none  Previous treatments: none    Specific Questions:  General health (excellent/good/fair/poor):  good   Pertinent medical history includes: Cancer, High blood pressure, and Factor V Leiden mutation  Medications (nil/NSAIDS/analg/steroids/anticoag/other):  Hydrochlorothiazide, Lisinopril, Simvastatin, Effexor, Vitamin D, multivitamins  Medical allergies:  Zoloft  Imaging (Xray):  1/17/2025  No fracture or malalignment. Subcortical lucency along the central medial femoral condyle which is nonspecific but could represent an osteochondral abnormality. No joint effusion.     Recent or major surgery (yes/no):  no  Night pain (yes/no):  no  Accidents (yes/no):  tripped in May, 2025 which inc her L knee pain   Unexplained weight loss (yes/no):  no  Barriers at home: no  Other  red flags: no    Sites for physical examination (back/hip/knee/ankle/foot/other): knee and lumbar  Postural Observation:  Sitting: Kyphotic thoracic region, increased lordosis lumbar region Change of posture:  No effect  Standing: Lordotic, lumbar region  Other observations:  antalgic gait, mild edema anterior medial knee.  Not able to actively lift L hip (march type of movement) in standing    Neurological: Motor:  good strength B LEs - hip flexion, knee extension, knee flexion, DF, great toe ext, EV, Reflexes:  symmetrical and brisk B LEs    Baselines (pain and functional activity): knee extension ROM, knee flexion ROM, antalgic gait, minimal squat motion (mini squat)    Extremities:  Knee    Movement Loss Kennedy Mod Min Nil R/L knee AROM/Pain   Flexion     128/100 with tightness   Extension     0 deg/ lacking 10 deg   Abduction        Adduction        Internal Rotation        External Rotation        Dorsiflexion        Plantarflexion        Inversion        Eversion        Other:          Passive Movement (+/- over pressure)/(PDM/ERP):  pain limits flexion, springy endfeel with L knee extension  Resisted Test Response (pain): good strength at the knee for both flexion and extension.  Can feel her knee but not really painful   Other Tests/Static Positioning: no change in symptoms with prone lying in lumbar extension when propped on elbows    Spine:  Movement loss: decreased curve reversal with FIS, nil/min loss EIS, symmetrical SGIS and can feel symptoms for the L knee when moving in both directions.   Effect of repeated movements: repeated lumbar extension in lying (press ups) - increased lower back pain during, No worse after.  No change in pain, ROM for the knee or back, antalgic gait continued.  Repeated Flexion in sitting felt good for the lower back.  Increased knee ROM into extension after, dec pain walking, increased ease with active hip flexion in standing  Effect of static positioning: prone lying in  sustained extension  - strain in lower back but no changes after.  Spine testing: Relevant, versus secondary problem    Baseline Symptoms: 100 deg knee flexion, lacking 10 deg ext.  Antalgic gait, pain with mini squat  Repeated Tests Symptom Response Mechanical Response   Active/Passive movement, resisted test, functional test During -  Produce, Abolish, Increase, Decrease, NE After -  Better, Worse, NB, NW, NE Effect -   ? or ? ROM, strength or key functional test No   Effect   Supine passive knee flexion  Increase  Pain during motion at the knee and produced anterior shin pain    Worse    Decreased flexion ROM antalgic gait continued     Active knee extension  Increase  Pain during motion with motion improving with reps    Better    Improved extension ROM, mini squat was easier.  Antalgic gait continued but felt better                  Effect of static positioning         Provisional Classification (Extremity/Spine):  Spine - Derangement - Asymmetrical, unilateral, symptoms below knee and Extremity - Derangement      Potential Drivers of Pain and/or Disability: Comorbidities    Principle of Management:   Education:  discussed assessing lower back (flexion in sitting) as had improved lumbar range of motion, increased ease with walking, knee extension felt better.  She thought that the flexion for the lower back provided better relief than knee extension exercises.  Thus, will assess lower back for 24-48 hours and then add knee extension if knee is not improving    Equipment provided:  none  Exercise type:  lumbar flexion in sitting 10 reps, 6-8 times a day, seated active knee extension (long arc quad) 10 reps, 4-6 times a day   FUNCTIONAL TESTS: Double Leg Squat: Anterior knee translation and Improper use of glutes/hips  PALPATION: mild tenderness medial anterior knee over the proximal tibia (location of mild edema).  Not tender along the joint line, medial or lateral ligaments, nor patella tendon.      Assessment  & Plan   CLINICAL IMPRESSIONS  Medical Diagnosis: Chronic L knee pain    Treatment Diagnosis: chronic L knee pain, low back pain with referred pain to L lower extremity   Impression/Assessment: Patient is a 70 year old female with L knee complaints, with walking she can feel L hip, L shin and L lower back pain.  The following significant findings have been identified: Pain, Decreased ROM/flexibility, Edema, Impaired gait, Impaired muscle performance, Decreased activity tolerance, and Impaired posture. These impairments interfere with their ability to perform self care tasks, recreational activities, household chores, driving , household mobility, and community mobility as compared to previous level of function.     Clinical Decision Making (Complexity):  Clinical Presentation: Stable/Uncomplicated  Clinical Presentation Rationale: based on medical and personal factors listed in PT evaluation  Clinical Decision Making (Complexity): Low complexity    PLAN OF CARE  Treatment Interventions:  Interventions: Manual Therapy, Neuromuscular Re-education, Therapeutic Activity, Therapeutic Exercise, Self-Care/Home Management    Long Term Goals     PT Goal 1  Goal Identifier: ambulation  Goal Description: patient will be able to walk her dog, 15 minutes without back or L LE symptoms  Rationale: to maximize safety and independence with performance of ADLs and functional tasks;to maximize safety and independence within the community  Goal Progress: will walk 15 minutes with increasing symptoms for the back and L LE.  Target Date: 09/04/25  PT Goal 2  Goal Identifier: stairs  Goal Description: patient will be able to ascend and descend stairs, 15 reps, without pain or weakness  Rationale: to maximize safety and independence with performance of ADLs and functional tasks;to maximize safety and independence within the home;to maximize safety and independence within the community  Goal Progress: weakness and pain for the L knee,  LE  Target Date: 10/16/25      Frequency of Treatment: 1 time a week  Duration of Treatment: 12 weeks    Recommended Referrals to Other Professionals:   Education Assessment:   Learner/Method: (P) Patient;No Barriers to Learning;Pictures/Video;Demonstration;Reading;Listening  Education Comments: (P) PTRx on phone and printed    Risks and benefits of evaluation/treatment have been explained.   Patient/Family/caregiver agrees with Plan of Care.     Evaluation Time:     PT Eval, Low Complexity Minutes (79129): (P) 25  Evaluation Only     Signing Clinician: Elana Alejandro, PT        [facility]                                                                                   OUTPATIENT PHYSICAL THERAPY      PLAN OF TREATMENT FOR OUTPATIENT REHABILITATION   Patient's Last Name, First Name, Roxie Mcqueen YOB: 1955   Provider's Name   [facility]   Medical Record No.  [id number]     Onset Date: 07/18/25 (PT referral date; has had knee symptoms for the past 2-3 years)  Start of Care Date: 07/24/25     Medical Diagnosis:  Chronic L knee pain      PT Treatment Diagnosis:  chronic L knee pain, low back pain with referred pain to L lower extremity Plan of Treatment  Frequency/Duration: 1 time a week/ 12 weeks    Certification date from 07/24/25 to 10/16/25         See note for plan of treatment details and functional goals     Elana Alejandro, PT                         I CERTIFY THE NEED FOR THESE SERVICES FURNISHED UNDER        THIS PLAN OF TREATMENT AND WHILE UNDER MY CARE     (Physician attestation of this document indicates review and certification of the therapy plan).              Referring Provider:  Marlen Rios    Initial Assessment  See Epic Evaluation- Start of Care Date: 07/24/25

## 2025-07-31 ENCOUNTER — THERAPY VISIT (OUTPATIENT)
Dept: PHYSICAL THERAPY | Facility: CLINIC | Age: 70
End: 2025-07-31
Payer: COMMERCIAL

## 2025-07-31 DIAGNOSIS — G89.29 CHRONIC PAIN OF LEFT KNEE: Primary | ICD-10-CM

## 2025-07-31 DIAGNOSIS — M25.562 CHRONIC PAIN OF LEFT KNEE: Primary | ICD-10-CM

## 2025-07-31 DIAGNOSIS — M54.16 LUMBAR BACK PAIN WITH RADICULOPATHY AFFECTING LEFT LOWER EXTREMITY: ICD-10-CM

## 2025-07-31 NOTE — TELEPHONE ENCOUNTER
REASON FOR VISIT: chronic pain of L knee    DATE OF APPT: 8/01/2025   NOTES (FOR ALL VISITS) STATUS DETAILS   [facility]  Marlen Rios APRN CNP 7/18/2025   EMG N/A    MEDICATION LIST N/A    IMAGING  (FOR ALL VISITS)     XR Internal [facility]  XR Knee left 1/17/2025   MRI (HEAD, NECK, SPINE) N/A    CT (HEAD, NECK, SPINE) N/A

## 2025-08-01 ENCOUNTER — PRE VISIT (OUTPATIENT)
Dept: ORTHOPEDICS | Facility: CLINIC | Age: 70
End: 2025-08-01

## 2025-08-07 ENCOUNTER — TELEPHONE (OUTPATIENT)
Dept: FAMILY MEDICINE | Facility: CLINIC | Age: 70
End: 2025-08-07
Payer: COMMERCIAL

## 2025-08-07 ENCOUNTER — TELEPHONE (OUTPATIENT)
Dept: ONCOLOGY | Facility: CLINIC | Age: 70
End: 2025-08-07
Payer: COMMERCIAL

## 2025-08-14 ENCOUNTER — THERAPY VISIT (OUTPATIENT)
Dept: PHYSICAL THERAPY | Facility: CLINIC | Age: 70
End: 2025-08-14
Payer: COMMERCIAL

## 2025-08-14 DIAGNOSIS — M25.562 CHRONIC PAIN OF LEFT KNEE: Primary | ICD-10-CM

## 2025-08-14 DIAGNOSIS — M54.16 LUMBAR BACK PAIN WITH RADICULOPATHY AFFECTING LEFT LOWER EXTREMITY: ICD-10-CM

## 2025-08-14 DIAGNOSIS — G89.29 CHRONIC PAIN OF LEFT KNEE: Primary | ICD-10-CM

## (undated) DEVICE — DRAPE LAP W/ARMBOARD 29410

## (undated) DEVICE — SU MONOCRYL 3-0 PS-2 18" UND Y497G

## (undated) DEVICE — DRSG PRIMAPORE 02X3" 7133

## (undated) DEVICE — GLOVE ESTEEM POWDER FREE 5.5  2D72PL55

## (undated) DEVICE — BLADE KNIFE SURG 15 371115

## (undated) DEVICE — SU MONOCRYL 4-0 PS-2 18" UND Y496G

## (undated) DEVICE — DRSG KERLIX FLUFFS X5

## (undated) DEVICE — SUCTION TIP POOLE K770

## (undated) DEVICE — ADH SKIN CLOSURE PREMIERPRO EXOFIN 1.0ML 3470

## (undated) DEVICE — ESU ELEC BLADE 2.75" COATED/INSULATED E1455

## (undated) DEVICE — DRAPE STERI APERATURE 51X33" 1030

## (undated) DEVICE — GLOVE ESTEEM BLUE W/NEU-THERA 6.0  2D73PB60

## (undated) DEVICE — CLIP APPLIER 11.5" PREMIUM II 134053

## (undated) DEVICE — PACK MINOR PROCEDURE CUSTOM

## (undated) DEVICE — DRSG STERI STRIP 1/2X4" R1547

## (undated) DEVICE — SYR BULB IRRIG DOVER 60 ML LATEX FREE 67000

## (undated) DEVICE — DRAPE LAP TRANSVERSE 29421

## (undated) DEVICE — PREP CHLORAPREP 26ML TINTED ORANGE  260815

## (undated) DEVICE — SU SILK 2-0 SH 30" K833H

## (undated) DEVICE — MARKER MARGIN MARKER STD 6 COLOR SGL USE MMS6

## (undated) RX ORDER — LIDOCAINE HYDROCHLORIDE 10 MG/ML
INJECTION, SOLUTION EPIDURAL; INFILTRATION; INTRACAUDAL; PERINEURAL
Status: DISPENSED
Start: 2021-07-22

## (undated) RX ORDER — LIDOCAINE HYDROCHLORIDE 20 MG/ML
INJECTION, SOLUTION EPIDURAL; INFILTRATION; INTRACAUDAL; PERINEURAL
Status: DISPENSED
Start: 2021-07-22

## (undated) RX ORDER — ONDANSETRON 2 MG/ML
INJECTION INTRAMUSCULAR; INTRAVENOUS
Status: DISPENSED
Start: 2021-07-22

## (undated) RX ORDER — HYDROMORPHONE HYDROCHLORIDE 1 MG/ML
INJECTION, SOLUTION INTRAMUSCULAR; INTRAVENOUS; SUBCUTANEOUS
Status: DISPENSED
Start: 2021-07-22

## (undated) RX ORDER — KETOROLAC TROMETHAMINE 30 MG/ML
INJECTION, SOLUTION INTRAMUSCULAR; INTRAVENOUS
Status: DISPENSED
Start: 2021-07-22

## (undated) RX ORDER — PROPOFOL 10 MG/ML
INJECTION, EMULSION INTRAVENOUS
Status: DISPENSED
Start: 2021-07-22

## (undated) RX ORDER — FENTANYL CITRATE 50 UG/ML
INJECTION, SOLUTION INTRAMUSCULAR; INTRAVENOUS
Status: DISPENSED
Start: 2021-07-22

## (undated) RX ORDER — BUPIVACAINE HYDROCHLORIDE AND EPINEPHRINE 2.5; 5 MG/ML; UG/ML
INJECTION, SOLUTION EPIDURAL; INFILTRATION; INTRACAUDAL; PERINEURAL
Status: DISPENSED
Start: 2021-07-22